# Patient Record
Sex: MALE | Employment: FULL TIME | URBAN - METROPOLITAN AREA
[De-identification: names, ages, dates, MRNs, and addresses within clinical notes are randomized per-mention and may not be internally consistent; named-entity substitution may affect disease eponyms.]

---

## 2022-11-09 ENCOUNTER — APPOINTMENT (OUTPATIENT)
Dept: GENERAL RADIOLOGY | Age: 42
DRG: 463 | End: 2022-11-09
Payer: COMMERCIAL

## 2022-11-09 ENCOUNTER — APPOINTMENT (OUTPATIENT)
Dept: CT IMAGING | Age: 42
DRG: 463 | End: 2022-11-09
Payer: COMMERCIAL

## 2022-11-09 ENCOUNTER — ANESTHESIA EVENT (OUTPATIENT)
Dept: OPERATING ROOM | Age: 42
DRG: 463 | End: 2022-11-09
Payer: COMMERCIAL

## 2022-11-09 ENCOUNTER — HOSPITAL ENCOUNTER (INPATIENT)
Age: 42
LOS: 17 days | Discharge: NURSING, REHAB OR LONG-TERM CARE | DRG: 463 | End: 2022-11-26
Attending: EMERGENCY MEDICINE | Admitting: SURGERY
Payer: COMMERCIAL

## 2022-11-09 ENCOUNTER — ANESTHESIA (OUTPATIENT)
Dept: OPERATING ROOM | Age: 42
DRG: 463 | End: 2022-11-09
Payer: COMMERCIAL

## 2022-11-09 DIAGNOSIS — S82.202C TYPE III OPEN FRACTURE OF LEFT TIBIA AND FIBULA, INITIAL ENCOUNTER: ICD-10-CM

## 2022-11-09 DIAGNOSIS — S82.402C TYPE III OPEN FRACTURE OF LEFT TIBIA AND FIBULA, INITIAL ENCOUNTER: ICD-10-CM

## 2022-11-09 DIAGNOSIS — S72.352A CLOSED DISPLACED COMMINUTED FRACTURE OF SHAFT OF LEFT FEMUR, INITIAL ENCOUNTER (HCC): ICD-10-CM
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
## 2022-11-09 PROBLEM — S82.142A TIBIAL PLATEAU FRACTURE, LEFT: Status: ACTIVE | Noted: 2022-11-09

## 2022-11-09 PROBLEM — S72.012A CLOSED SUBCAPITAL FRACTURE OF FEMUR, LEFT, INITIAL ENCOUNTER (HCC): Status: ACTIVE | Noted: 2022-11-09

## 2022-11-09 PROBLEM — S42.002A FRACTURE OF LEFT CLAVICLE: Status: ACTIVE | Noted: 2022-11-09

## 2022-11-09 PROBLEM — S82.402A FRACTURE OF LEFT FIBULA: Status: ACTIVE | Noted: 2022-11-09

## 2022-11-09 PROBLEM — S80.852A FOREIGN BODY IN LEFT LOWER EXTREMITY: Status: ACTIVE | Noted: 2022-11-09

## 2022-11-09 LAB
ABSOLUTE EOS #: 0 K/UL (ref 0–0.4)
ABSOLUTE IMMATURE GRANULOCYTE: 0 K/UL (ref 0–0.3)
ABSOLUTE LYMPH #: 2.34 K/UL (ref 1–4.8)
ABSOLUTE MONO #: 1.7 K/UL (ref 0.1–0.8)
ANION GAP SERPL CALCULATED.3IONS-SCNC: 12 MMOL/L (ref 9–17)
BASOPHILS # BLD: 0 % (ref 0–2)
BASOPHILS ABSOLUTE: 0 K/UL (ref 0–0.2)
BLOOD BANK SPECIMEN: ABNORMAL
BUN BLDV-MCNC: 19 MG/DL (ref 6–20)
CARBOXYHEMOGLOBIN: 3.8 % (ref 0–5)
CHLORIDE BLD-SCNC: 102 MMOL/L (ref 98–107)
CO2: 22 MMOL/L (ref 20–31)
CREAT SERPL-MCNC: 1.18 MG/DL (ref 0.7–1.2)
EOSINOPHILS RELATIVE PERCENT: 0 % (ref 1–4)
ETHANOL PERCENT: <0.01 %
ETHANOL: <10 MG/DL
FIO2: ABNORMAL
GFR SERPL CREATININE-BSD FRML MDRD: >60 ML/MIN/1.73M2
GLUCOSE BLD-MCNC: 155 MG/DL (ref 75–110)
GLUCOSE BLD-MCNC: 196 MG/DL (ref 70–99)
HCG QUALITATIVE: ABNORMAL
HCO3 VENOUS: 22.6 MMOL/L (ref 24–30)
HCT VFR BLD CALC: 40.8 % (ref 40.7–50.3)
HCT VFR BLD CALC: 44.4 % (ref 40.7–50.3)
HEMOGLOBIN: 13.9 G/DL (ref 13–17)
HEMOGLOBIN: 14.9 G/DL (ref 13–17)
IMMATURE GRANULOCYTES: 0 %
INR BLD: 1
LYMPHOCYTES # BLD: 11 % (ref 24–44)
MCH RBC QN AUTO: 31.2 PG (ref 25.2–33.5)
MCH RBC QN AUTO: 31.4 PG (ref 25.2–33.5)
MCHC RBC AUTO-ENTMCNC: 33.6 G/DL (ref 28.4–34.8)
MCHC RBC AUTO-ENTMCNC: 34.1 G/DL (ref 28.4–34.8)
MCV RBC AUTO: 91.5 FL (ref 82.6–102.9)
MCV RBC AUTO: 93.5 FL (ref 82.6–102.9)
MONOCYTES # BLD: 8 % (ref 1–7)
MORPHOLOGY: NORMAL
NEGATIVE BASE EXCESS, VEN: 4.8 MMOL/L (ref 0–2)
NRBC AUTOMATED: 0 PER 100 WBC
NRBC AUTOMATED: 0 PER 100 WBC
O2 SAT, VEN: 45.4 % (ref 60–85)
PARTIAL THROMBOPLASTIN TIME: 20.3 SEC (ref 20.5–30.5)
PATIENT TEMP: 37
PCO2, VEN: 52.6 MM HG (ref 39–55)
PDW BLD-RTO: 12.5 % (ref 11.8–14.4)
PDW BLD-RTO: 12.9 % (ref 11.8–14.4)
PH VENOUS: 7.26 (ref 7.32–7.42)
PLATELET # BLD: 187 K/UL (ref 138–453)
PLATELET # BLD: 267 K/UL (ref 138–453)
PMV BLD AUTO: 11.1 FL (ref 8.1–13.5)
PMV BLD AUTO: 11.2 FL (ref 8.1–13.5)
PO2, VEN: 27 MM HG (ref 30–50)
POTASSIUM SERPL-SCNC: 3.4 MMOL/L (ref 3.7–5.3)
PROTHROMBIN TIME: 10.7 SEC (ref 9.1–12.3)
RBC # BLD: 4.46 M/UL (ref 4.21–5.77)
RBC # BLD: 4.75 M/UL (ref 4.21–5.77)
SEG NEUTROPHILS: 81 % (ref 36–66)
SEGMENTED NEUTROPHILS ABSOLUTE COUNT: 17.26 K/UL (ref 1.8–7.7)
SODIUM BLD-SCNC: 136 MMOL/L (ref 135–144)
WBC # BLD: 21.3 K/UL (ref 3.5–11.3)
WBC # BLD: 30 K/UL (ref 3.5–11.3)

## 2022-11-09 PROCEDURE — 3700000001 HC ADD 15 MINUTES (ANESTHESIA): Performed by: STUDENT IN AN ORGANIZED HEALTH CARE EDUCATION/TRAINING PROGRAM

## 2022-11-09 PROCEDURE — 3600000004 HC SURGERY LEVEL 4 BASE: Performed by: STUDENT IN AN ORGANIZED HEALTH CARE EDUCATION/TRAINING PROGRAM

## 2022-11-09 PROCEDURE — 2580000003 HC RX 258: Performed by: NURSE PRACTITIONER

## 2022-11-09 PROCEDURE — 99285 EMERGENCY DEPT VISIT HI MDM: CPT

## 2022-11-09 PROCEDURE — 11012 DEB SKIN BONE AT FX SITE: CPT | Performed by: STUDENT IN AN ORGANIZED HEALTH CARE EDUCATION/TRAINING PROGRAM

## 2022-11-09 PROCEDURE — 73590 X-RAY EXAM OF LOWER LEG: CPT

## 2022-11-09 PROCEDURE — 82947 ASSAY GLUCOSE BLOOD QUANT: CPT

## 2022-11-09 PROCEDURE — 6360000002 HC RX W HCPCS: Performed by: NURSE PRACTITIONER

## 2022-11-09 PROCEDURE — 6360000002 HC RX W HCPCS: Performed by: ANESTHESIOLOGY

## 2022-11-09 PROCEDURE — 3209999900 FLUORO FOR SURGICAL PROCEDURES

## 2022-11-09 PROCEDURE — 99254 IP/OBS CNSLTJ NEW/EST MOD 60: CPT | Performed by: STUDENT IN AN ORGANIZED HEALTH CARE EDUCATION/TRAINING PROGRAM

## 2022-11-09 PROCEDURE — 36415 COLL VENOUS BLD VENIPUNCTURE: CPT

## 2022-11-09 PROCEDURE — 71260 CT THORAX DX C+: CPT

## 2022-11-09 PROCEDURE — 73552 X-RAY EXAM OF FEMUR 2/>: CPT

## 2022-11-09 PROCEDURE — 82805 BLOOD GASES W/O2 SATURATION: CPT

## 2022-11-09 PROCEDURE — 2500000003 HC RX 250 WO HCPCS

## 2022-11-09 PROCEDURE — 2580000003 HC RX 258: Performed by: NURSE ANESTHETIST, CERTIFIED REGISTERED

## 2022-11-09 PROCEDURE — 3209999900 CT LUMBAR SPINE TRAUMA RECONSTRUCTION

## 2022-11-09 PROCEDURE — 2500000003 HC RX 250 WO HCPCS: Performed by: NURSE ANESTHETIST, CERTIFIED REGISTERED

## 2022-11-09 PROCEDURE — 73706 CT ANGIO LWR EXTR W/O&W/DYE: CPT

## 2022-11-09 PROCEDURE — G0480 DRUG TEST DEF 1-7 CLASSES: HCPCS

## 2022-11-09 PROCEDURE — 82565 ASSAY OF CREATININE: CPT

## 2022-11-09 PROCEDURE — 2580000003 HC RX 258: Performed by: STUDENT IN AN ORGANIZED HEALTH CARE EDUCATION/TRAINING PROGRAM

## 2022-11-09 PROCEDURE — 86901 BLOOD TYPING SEROLOGIC RH(D): CPT

## 2022-11-09 PROCEDURE — 7100000001 HC PACU RECOVERY - ADDTL 15 MIN: Performed by: STUDENT IN AN ORGANIZED HEALTH CARE EDUCATION/TRAINING PROGRAM

## 2022-11-09 PROCEDURE — 0JCP0ZZ EXTIRPATION OF MATTER FROM LEFT LOWER LEG SUBCUTANEOUS TISSUE AND FASCIA, OPEN APPROACH: ICD-10-PCS | Performed by: STUDENT IN AN ORGANIZED HEALTH CARE EDUCATION/TRAINING PROGRAM

## 2022-11-09 PROCEDURE — 2060000000 HC ICU INTERMEDIATE R&B

## 2022-11-09 PROCEDURE — 73560 X-RAY EXAM OF KNEE 1 OR 2: CPT

## 2022-11-09 PROCEDURE — 2500000003 HC RX 250 WO HCPCS: Performed by: SURGERY

## 2022-11-09 PROCEDURE — 80051 ELECTROLYTE PANEL: CPT

## 2022-11-09 PROCEDURE — 85610 PROTHROMBIN TIME: CPT

## 2022-11-09 PROCEDURE — 73000 X-RAY EXAM OF COLLAR BONE: CPT

## 2022-11-09 PROCEDURE — 6360000004 HC RX CONTRAST MEDICATION

## 2022-11-09 PROCEDURE — 85025 COMPLETE CBC W/AUTO DIFF WBC: CPT

## 2022-11-09 PROCEDURE — 70450 CT HEAD/BRAIN W/O DYE: CPT

## 2022-11-09 PROCEDURE — 6810039000 HC L1 TRAUMA ALERT

## 2022-11-09 PROCEDURE — 72170 X-RAY EXAM OF PELVIS: CPT

## 2022-11-09 PROCEDURE — 27506 TREATMENT OF THIGH FRACTURE: CPT | Performed by: STUDENT IN AN ORGANIZED HEALTH CARE EDUCATION/TRAINING PROGRAM

## 2022-11-09 PROCEDURE — P9016 RBC LEUKOCYTES REDUCED: HCPCS

## 2022-11-09 PROCEDURE — 6360000002 HC RX W HCPCS: Performed by: NURSE ANESTHETIST, CERTIFIED REGISTERED

## 2022-11-09 PROCEDURE — 2720000010 HC SURG SUPPLY STERILE: Performed by: STUDENT IN AN ORGANIZED HEALTH CARE EDUCATION/TRAINING PROGRAM

## 2022-11-09 PROCEDURE — 90715 TDAP VACCINE 7 YRS/> IM: CPT

## 2022-11-09 PROCEDURE — 0QS906Z REPOSITION LEFT FEMORAL SHAFT WITH INTRAMEDULLARY INTERNAL FIXATION DEVICE, OPEN APPROACH: ICD-10-PCS | Performed by: STUDENT IN AN ORGANIZED HEALTH CARE EDUCATION/TRAINING PROGRAM

## 2022-11-09 PROCEDURE — 86900 BLOOD TYPING SEROLOGIC ABO: CPT

## 2022-11-09 PROCEDURE — 3209999900 CT THORACIC SPINE TRAUMA RECONSTRUCTION

## 2022-11-09 PROCEDURE — 72128 CT CHEST SPINE W/O DYE: CPT

## 2022-11-09 PROCEDURE — 86850 RBC ANTIBODY SCREEN: CPT

## 2022-11-09 PROCEDURE — 85027 COMPLETE CBC AUTOMATED: CPT

## 2022-11-09 PROCEDURE — 84520 ASSAY OF UREA NITROGEN: CPT

## 2022-11-09 PROCEDURE — 84703 CHORIONIC GONADOTROPIN ASSAY: CPT

## 2022-11-09 PROCEDURE — 72131 CT LUMBAR SPINE W/O DYE: CPT

## 2022-11-09 PROCEDURE — 73030 X-RAY EXAM OF SHOULDER: CPT

## 2022-11-09 PROCEDURE — 7100000000 HC PACU RECOVERY - FIRST 15 MIN: Performed by: STUDENT IN AN ORGANIZED HEALTH CARE EDUCATION/TRAINING PROGRAM

## 2022-11-09 PROCEDURE — 2709999900 HC NON-CHARGEABLE SUPPLY: Performed by: STUDENT IN AN ORGANIZED HEALTH CARE EDUCATION/TRAINING PROGRAM

## 2022-11-09 PROCEDURE — 73562 X-RAY EXAM OF KNEE 3: CPT

## 2022-11-09 PROCEDURE — 6360000002 HC RX W HCPCS: Performed by: STUDENT IN AN ORGANIZED HEALTH CARE EDUCATION/TRAINING PROGRAM

## 2022-11-09 PROCEDURE — 2500000003 HC RX 250 WO HCPCS: Performed by: NURSE PRACTITIONER

## 2022-11-09 PROCEDURE — 3600000014 HC SURGERY LEVEL 4 ADDTL 15MIN: Performed by: STUDENT IN AN ORGANIZED HEALTH CARE EDUCATION/TRAINING PROGRAM

## 2022-11-09 PROCEDURE — 11960 INSERT TISSUE EXPANDER(S): CPT | Performed by: STUDENT IN AN ORGANIZED HEALTH CARE EDUCATION/TRAINING PROGRAM

## 2022-11-09 PROCEDURE — 6360000002 HC RX W HCPCS: Performed by: SURGERY

## 2022-11-09 PROCEDURE — 71045 X-RAY EXAM CHEST 1 VIEW: CPT

## 2022-11-09 PROCEDURE — 3700000000 HC ANESTHESIA ATTENDED CARE: Performed by: STUDENT IN AN ORGANIZED HEALTH CARE EDUCATION/TRAINING PROGRAM

## 2022-11-09 PROCEDURE — 90471 IMMUNIZATION ADMIN: CPT

## 2022-11-09 PROCEDURE — 86920 COMPATIBILITY TEST SPIN: CPT

## 2022-11-09 PROCEDURE — C1769 GUIDE WIRE: HCPCS | Performed by: STUDENT IN AN ORGANIZED HEALTH CARE EDUCATION/TRAINING PROGRAM

## 2022-11-09 PROCEDURE — C1713 ANCHOR/SCREW BN/BN,TIS/BN: HCPCS | Performed by: STUDENT IN AN ORGANIZED HEALTH CARE EDUCATION/TRAINING PROGRAM

## 2022-11-09 PROCEDURE — 6360000002 HC RX W HCPCS

## 2022-11-09 PROCEDURE — 85730 THROMBOPLASTIN TIME PARTIAL: CPT

## 2022-11-09 PROCEDURE — 72125 CT NECK SPINE W/O DYE: CPT

## 2022-11-09 PROCEDURE — 0JHP0NZ INSERTION OF TISSUE EXPANDER INTO LEFT LOWER LEG SUBCUTANEOUS TISSUE AND FASCIA, OPEN APPROACH: ICD-10-PCS | Performed by: STUDENT IN AN ORGANIZED HEALTH CARE EDUCATION/TRAINING PROGRAM

## 2022-11-09 DEVICE — SCREW BNE LCK 5X64 MM XL25 RETROGRADE TI STRL RFNADVANCED: Type: IMPLANTABLE DEVICE | Site: FEMUR | Status: FUNCTIONAL

## 2022-11-09 DEVICE — SCREW LK F/IM NAIL 3X36MM XL25 ST: Type: IMPLANTABLE DEVICE | Site: FEMUR | Status: FUNCTIONAL

## 2022-11-09 DEVICE — SCREW LK F/IM NAIL 5X42MM XL25 STERILE: Type: IMPLANTABLE DEVICE | Site: FEMUR | Status: FUNCTIONAL

## 2022-11-09 DEVICE — SCREW LCK F/IM NAIL 5X44MM XL25 STR: Type: IMPLANTABLE DEVICE | Site: FEMUR | Status: FUNCTIONAL

## 2022-11-09 DEVICE — IMPLANTABLE DEVICE: Type: IMPLANTABLE DEVICE | Site: FEMUR | Status: FUNCTIONAL

## 2022-11-09 RX ORDER — SODIUM CHLORIDE 0.9 % (FLUSH) 0.9 %
5-40 SYRINGE (ML) INJECTION PRN
Status: DISCONTINUED | OUTPATIENT
Start: 2022-11-09 | End: 2022-11-11

## 2022-11-09 RX ORDER — ONDANSETRON 2 MG/ML
4 INJECTION INTRAMUSCULAR; INTRAVENOUS
Status: ACTIVE | OUTPATIENT
Start: 2022-11-09 | End: 2022-11-10

## 2022-11-09 RX ORDER — GINSENG 100 MG
CAPSULE ORAL 3 TIMES DAILY
Status: DISCONTINUED | OUTPATIENT
Start: 2022-11-09 | End: 2022-11-26 | Stop reason: HOSPADM

## 2022-11-09 RX ORDER — ONDANSETRON 2 MG/ML
INJECTION INTRAMUSCULAR; INTRAVENOUS PRN
Status: DISCONTINUED | OUTPATIENT
Start: 2022-11-09 | End: 2022-11-09 | Stop reason: SDUPTHER

## 2022-11-09 RX ORDER — IBUPROFEN 400 MG/1
400 TABLET ORAL EVERY 4 HOURS
Status: DISPENSED | OUTPATIENT
Start: 2022-11-09 | End: 2022-11-16

## 2022-11-09 RX ORDER — POLYETHYLENE GLYCOL 3350 17 G/17G
17 POWDER, FOR SOLUTION ORAL DAILY
Status: DISCONTINUED | OUTPATIENT
Start: 2022-11-09 | End: 2022-11-26 | Stop reason: HOSPADM

## 2022-11-09 RX ORDER — FENTANYL CITRATE 50 UG/ML
INJECTION, SOLUTION INTRAMUSCULAR; INTRAVENOUS
Status: DISPENSED
Start: 2022-11-09 | End: 2022-11-09

## 2022-11-09 RX ORDER — CLINDAMYCIN PHOSPHATE 600 MG/50ML
600 INJECTION INTRAVENOUS EVERY 8 HOURS
Status: DISCONTINUED | OUTPATIENT
Start: 2022-11-09 | End: 2022-11-16

## 2022-11-09 RX ORDER — FENTANYL CITRATE 50 UG/ML
INJECTION, SOLUTION INTRAMUSCULAR; INTRAVENOUS DAILY PRN
Status: COMPLETED | OUTPATIENT
Start: 2022-11-09 | End: 2022-11-09

## 2022-11-09 RX ORDER — FENTANYL CITRATE 50 UG/ML
50 INJECTION, SOLUTION INTRAMUSCULAR; INTRAVENOUS EVERY 5 MIN PRN
Status: COMPLETED | OUTPATIENT
Start: 2022-11-09 | End: 2022-11-09

## 2022-11-09 RX ORDER — CLINDAMYCIN PHOSPHATE 600 MG/50ML
600 INJECTION INTRAVENOUS ONCE
Status: COMPLETED | OUTPATIENT
Start: 2022-11-09 | End: 2022-11-09

## 2022-11-09 RX ORDER — ACETAMINOPHEN 500 MG
1000 TABLET ORAL EVERY 8 HOURS SCHEDULED
Status: DISCONTINUED | OUTPATIENT
Start: 2022-11-09 | End: 2022-11-26 | Stop reason: HOSPADM

## 2022-11-09 RX ORDER — SODIUM CHLORIDE, SODIUM LACTATE, POTASSIUM CHLORIDE, CALCIUM CHLORIDE 600; 310; 30; 20 MG/100ML; MG/100ML; MG/100ML; MG/100ML
INJECTION, SOLUTION INTRAVENOUS CONTINUOUS
Status: DISCONTINUED | OUTPATIENT
Start: 2022-11-09 | End: 2022-11-11

## 2022-11-09 RX ORDER — ONDANSETRON 2 MG/ML
4 INJECTION INTRAMUSCULAR; INTRAVENOUS EVERY 6 HOURS PRN
Status: DISCONTINUED | OUTPATIENT
Start: 2022-11-09 | End: 2022-11-26 | Stop reason: HOSPADM

## 2022-11-09 RX ORDER — LIDOCAINE HYDROCHLORIDE 10 MG/ML
INJECTION, SOLUTION INFILTRATION; PERINEURAL
Status: COMPLETED
Start: 2022-11-09 | End: 2022-11-09

## 2022-11-09 RX ORDER — GLYCOPYRROLATE 0.2 MG/ML
INJECTION INTRAMUSCULAR; INTRAVENOUS PRN
Status: DISCONTINUED | OUTPATIENT
Start: 2022-11-09 | End: 2022-11-09 | Stop reason: SDUPTHER

## 2022-11-09 RX ORDER — DEXAMETHASONE SODIUM PHOSPHATE 10 MG/ML
INJECTION INTRAMUSCULAR; INTRAVENOUS PRN
Status: DISCONTINUED | OUTPATIENT
Start: 2022-11-09 | End: 2022-11-09 | Stop reason: SDUPTHER

## 2022-11-09 RX ORDER — CLINDAMYCIN PHOSPHATE 600 MG/50ML
INJECTION INTRAVENOUS
Status: COMPLETED
Start: 2022-11-09 | End: 2022-11-09

## 2022-11-09 RX ORDER — TOBRAMYCIN 1.2 G/30ML
INJECTION, POWDER, LYOPHILIZED, FOR SOLUTION INTRAVENOUS PRN
Status: DISCONTINUED | OUTPATIENT
Start: 2022-11-09 | End: 2022-11-09 | Stop reason: ALTCHOICE

## 2022-11-09 RX ORDER — PROPOFOL 10 MG/ML
INJECTION, EMULSION INTRAVENOUS PRN
Status: DISCONTINUED | OUTPATIENT
Start: 2022-11-09 | End: 2022-11-09 | Stop reason: SDUPTHER

## 2022-11-09 RX ORDER — LIDOCAINE HYDROCHLORIDE 10 MG/ML
5 INJECTION, SOLUTION INFILTRATION; PERINEURAL ONCE
Status: COMPLETED | OUTPATIENT
Start: 2022-11-09 | End: 2022-11-09

## 2022-11-09 RX ORDER — SODIUM CHLORIDE 9 MG/ML
INJECTION, SOLUTION INTRAVENOUS PRN
Status: DISCONTINUED | OUTPATIENT
Start: 2022-11-09 | End: 2022-11-11

## 2022-11-09 RX ORDER — ROCURONIUM BROMIDE 10 MG/ML
INJECTION, SOLUTION INTRAVENOUS PRN
Status: DISCONTINUED | OUTPATIENT
Start: 2022-11-09 | End: 2022-11-09 | Stop reason: SDUPTHER

## 2022-11-09 RX ORDER — MAGNESIUM HYDROXIDE 1200 MG/15ML
LIQUID ORAL CONTINUOUS PRN
Status: COMPLETED | OUTPATIENT
Start: 2022-11-09 | End: 2022-11-09

## 2022-11-09 RX ORDER — FENTANYL CITRATE 50 UG/ML
25 INJECTION, SOLUTION INTRAMUSCULAR; INTRAVENOUS EVERY 5 MIN PRN
Status: DISCONTINUED | OUTPATIENT
Start: 2022-11-09 | End: 2022-11-10

## 2022-11-09 RX ORDER — TRANEXAMIC ACID 10 MG/ML
1000 INJECTION, SOLUTION INTRAVENOUS ONCE
Status: COMPLETED | OUTPATIENT
Start: 2022-11-09 | End: 2022-11-09

## 2022-11-09 RX ORDER — FENTANYL CITRATE 50 UG/ML
INJECTION, SOLUTION INTRAMUSCULAR; INTRAVENOUS PRN
Status: DISCONTINUED | OUTPATIENT
Start: 2022-11-09 | End: 2022-11-09 | Stop reason: SDUPTHER

## 2022-11-09 RX ORDER — ONDANSETRON 4 MG/1
4 TABLET, ORALLY DISINTEGRATING ORAL EVERY 8 HOURS PRN
Status: DISCONTINUED | OUTPATIENT
Start: 2022-11-09 | End: 2022-11-26 | Stop reason: HOSPADM

## 2022-11-09 RX ORDER — SODIUM CHLORIDE 0.9 % (FLUSH) 0.9 %
10 SYRINGE (ML) INJECTION PRN
Status: DISCONTINUED | OUTPATIENT
Start: 2022-11-09 | End: 2022-11-26 | Stop reason: HOSPADM

## 2022-11-09 RX ORDER — PHENYLEPHRINE HYDROCHLORIDE 10 MG/ML
INJECTION INTRAVENOUS PRN
Status: DISCONTINUED | OUTPATIENT
Start: 2022-11-09 | End: 2022-11-09 | Stop reason: SDUPTHER

## 2022-11-09 RX ORDER — SODIUM CHLORIDE 0.9 % (FLUSH) 0.9 %
5-40 SYRINGE (ML) INJECTION EVERY 12 HOURS SCHEDULED
Status: DISCONTINUED | OUTPATIENT
Start: 2022-11-09 | End: 2022-11-11

## 2022-11-09 RX ORDER — METHOCARBAMOL 750 MG/1
750 TABLET, FILM COATED ORAL EVERY 8 HOURS
Status: DISCONTINUED | OUTPATIENT
Start: 2022-11-09 | End: 2022-11-26 | Stop reason: HOSPADM

## 2022-11-09 RX ORDER — OXYCODONE HYDROCHLORIDE 5 MG/1
10 TABLET ORAL EVERY 4 HOURS PRN
Status: DISCONTINUED | OUTPATIENT
Start: 2022-11-09 | End: 2022-11-10

## 2022-11-09 RX ORDER — VANCOMYCIN HYDROCHLORIDE 1 G/20ML
INJECTION, POWDER, LYOPHILIZED, FOR SOLUTION INTRAVENOUS PRN
Status: DISCONTINUED | OUTPATIENT
Start: 2022-11-09 | End: 2022-11-09 | Stop reason: ALTCHOICE

## 2022-11-09 RX ORDER — NEOSTIGMINE METHYLSULFATE 5 MG/5 ML
SYRINGE (ML) INTRAVENOUS PRN
Status: DISCONTINUED | OUTPATIENT
Start: 2022-11-09 | End: 2022-11-09 | Stop reason: SDUPTHER

## 2022-11-09 RX ORDER — MIDAZOLAM HYDROCHLORIDE 1 MG/ML
INJECTION INTRAMUSCULAR; INTRAVENOUS PRN
Status: DISCONTINUED | OUTPATIENT
Start: 2022-11-09 | End: 2022-11-09 | Stop reason: SDUPTHER

## 2022-11-09 RX ORDER — LIDOCAINE HYDROCHLORIDE 10 MG/ML
INJECTION, SOLUTION EPIDURAL; INFILTRATION; INTRACAUDAL; PERINEURAL PRN
Status: DISCONTINUED | OUTPATIENT
Start: 2022-11-09 | End: 2022-11-09 | Stop reason: SDUPTHER

## 2022-11-09 RX ORDER — CEFAZOLIN SODIUM 1 G/3ML
INJECTION, POWDER, FOR SOLUTION INTRAMUSCULAR; INTRAVENOUS PRN
Status: DISCONTINUED | OUTPATIENT
Start: 2022-11-09 | End: 2022-11-09 | Stop reason: SDUPTHER

## 2022-11-09 RX ORDER — SODIUM CHLORIDE 9 MG/ML
INJECTION, SOLUTION INTRAVENOUS PRN
Status: DISCONTINUED | OUTPATIENT
Start: 2022-11-09 | End: 2022-11-26 | Stop reason: HOSPADM

## 2022-11-09 RX ORDER — SENNA AND DOCUSATE SODIUM 50; 8.6 MG/1; MG/1
1 TABLET, FILM COATED ORAL 2 TIMES DAILY
Status: DISCONTINUED | OUTPATIENT
Start: 2022-11-09 | End: 2022-11-26 | Stop reason: HOSPADM

## 2022-11-09 RX ADMIN — LIDOCAINE HYDROCHLORIDE: 10 INJECTION, SOLUTION INFILTRATION; PERINEURAL at 09:33

## 2022-11-09 RX ADMIN — TRANEXAMIC ACID 1000 MG: 10 INJECTION, SOLUTION INTRAVENOUS at 12:22

## 2022-11-09 RX ADMIN — PHENYLEPHRINE HYDROCHLORIDE 100 MCG: 10 INJECTION INTRAVENOUS at 19:48

## 2022-11-09 RX ADMIN — IOPAMIDOL 200 ML: 755 INJECTION, SOLUTION INTRAVENOUS at 09:01

## 2022-11-09 RX ADMIN — FENTANYL CITRATE 50 MCG: 0.05 INJECTION, SOLUTION INTRAMUSCULAR; INTRAVENOUS at 22:07

## 2022-11-09 RX ADMIN — ROCURONIUM BROMIDE 50 MG: 10 INJECTION, SOLUTION INTRAVENOUS at 17:51

## 2022-11-09 RX ADMIN — ROCURONIUM BROMIDE 10 MG: 10 INJECTION, SOLUTION INTRAVENOUS at 20:37

## 2022-11-09 RX ADMIN — CEFAZOLIN 2 G: 1 INJECTION, POWDER, FOR SOLUTION INTRAMUSCULAR; INTRAVENOUS at 18:13

## 2022-11-09 RX ADMIN — FENTANYL CITRATE 50 MCG: 0.05 INJECTION, SOLUTION INTRAMUSCULAR; INTRAVENOUS at 20:16

## 2022-11-09 RX ADMIN — CLINDAMYCIN PHOSPHATE 600 MG: 600 INJECTION INTRAVENOUS at 09:49

## 2022-11-09 RX ADMIN — ROCURONIUM BROMIDE 10 MG: 10 INJECTION, SOLUTION INTRAVENOUS at 19:43

## 2022-11-09 RX ADMIN — PHENYLEPHRINE HYDROCHLORIDE 100 MCG: 10 INJECTION INTRAVENOUS at 21:37

## 2022-11-09 RX ADMIN — LIDOCAINE HYDROCHLORIDE 50 MG: 10 INJECTION, SOLUTION EPIDURAL; INFILTRATION; INTRACAUDAL; PERINEURAL at 17:49

## 2022-11-09 RX ADMIN — PHENYLEPHRINE HYDROCHLORIDE 100 MCG: 10 INJECTION INTRAVENOUS at 18:45

## 2022-11-09 RX ADMIN — GENTAMICIN SULFATE 650 MG: 40 INJECTION, SOLUTION INTRAMUSCULAR; INTRAVENOUS at 10:55

## 2022-11-09 RX ADMIN — PHENYLEPHRINE HYDROCHLORIDE 100 MCG: 10 INJECTION INTRAVENOUS at 19:51

## 2022-11-09 RX ADMIN — ONDANSETRON 4 MG: 2 INJECTION INTRAMUSCULAR; INTRAVENOUS at 21:17

## 2022-11-09 RX ADMIN — FENTANYL CITRATE 100 MCG: 0.05 INJECTION, SOLUTION INTRAMUSCULAR; INTRAVENOUS at 18:26

## 2022-11-09 RX ADMIN — Medication 0.2 MG/KG/HR: at 09:49

## 2022-11-09 RX ADMIN — FENTANYL CITRATE 100 MCG: 50 INJECTION, SOLUTION INTRAMUSCULAR; INTRAVENOUS at 08:18

## 2022-11-09 RX ADMIN — PHENYLEPHRINE HYDROCHLORIDE 100 MCG/MIN: 10 INJECTION INTRAVENOUS at 18:57

## 2022-11-09 RX ADMIN — CLINDAMYCIN IN 5 PERCENT DEXTROSE 600 MG: 12 INJECTION, SOLUTION INTRAVENOUS at 09:49

## 2022-11-09 RX ADMIN — SODIUM CHLORIDE, POTASSIUM CHLORIDE, SODIUM LACTATE AND CALCIUM CHLORIDE: 600; 310; 30; 20 INJECTION, SOLUTION INTRAVENOUS at 10:56

## 2022-11-09 RX ADMIN — FENTANYL CITRATE 50 MCG: 50 INJECTION INTRAMUSCULAR; INTRAVENOUS at 22:57

## 2022-11-09 RX ADMIN — PROPOFOL 200 MG: 10 INJECTION, EMULSION INTRAVENOUS at 17:50

## 2022-11-09 RX ADMIN — DEXAMETHASONE SODIUM PHOSPHATE 10 MG: 10 INJECTION INTRAMUSCULAR; INTRAVENOUS at 18:19

## 2022-11-09 RX ADMIN — FENTANYL CITRATE 50 MCG: 0.05 INJECTION, SOLUTION INTRAMUSCULAR; INTRAVENOUS at 21:25

## 2022-11-09 RX ADMIN — SODIUM CHLORIDE, POTASSIUM CHLORIDE, SODIUM LACTATE AND CALCIUM CHLORIDE: 600; 310; 30; 20 INJECTION, SOLUTION INTRAVENOUS at 21:16

## 2022-11-09 RX ADMIN — FENTANYL CITRATE 50 MCG: 50 INJECTION, SOLUTION INTRAMUSCULAR; INTRAVENOUS at 08:54

## 2022-11-09 RX ADMIN — FENTANYL CITRATE 50 MCG: 50 INJECTION INTRAMUSCULAR; INTRAVENOUS at 23:03

## 2022-11-09 RX ADMIN — FENTANYL CITRATE 50 MCG: 50 INJECTION, SOLUTION INTRAMUSCULAR; INTRAVENOUS at 08:28

## 2022-11-09 RX ADMIN — MIDAZOLAM 2 MG: 1 INJECTION INTRAMUSCULAR; INTRAVENOUS at 17:47

## 2022-11-09 RX ADMIN — PHENYLEPHRINE HYDROCHLORIDE 100 MCG: 10 INJECTION INTRAVENOUS at 18:49

## 2022-11-09 RX ADMIN — Medication 4 MG: at 21:23

## 2022-11-09 RX ADMIN — PHENYLEPHRINE HYDROCHLORIDE 200 MCG: 10 INJECTION INTRAVENOUS at 21:45

## 2022-11-09 RX ADMIN — ROCURONIUM BROMIDE 20 MG: 10 INJECTION, SOLUTION INTRAVENOUS at 18:27

## 2022-11-09 RX ADMIN — TRANEXAMIC ACID 1000 MG: 10 INJECTION, SOLUTION INTRAVENOUS at 17:59

## 2022-11-09 RX ADMIN — PHENYLEPHRINE HYDROCHLORIDE 100 MCG: 10 INJECTION INTRAVENOUS at 18:52

## 2022-11-09 RX ADMIN — FENTANYL CITRATE 50 MCG: 0.05 INJECTION, SOLUTION INTRAMUSCULAR; INTRAVENOUS at 22:11

## 2022-11-09 RX ADMIN — ROCURONIUM BROMIDE 10 MG: 10 INJECTION, SOLUTION INTRAVENOUS at 18:53

## 2022-11-09 RX ADMIN — FENTANYL CITRATE 50 MCG: 0.05 INJECTION, SOLUTION INTRAMUSCULAR; INTRAVENOUS at 21:24

## 2022-11-09 RX ADMIN — GLYCOPYRROLATE 0.6 MG: 0.2 INJECTION INTRAMUSCULAR; INTRAVENOUS at 21:23

## 2022-11-09 RX ADMIN — TETANUS TOXOID, REDUCED DIPHTHERIA TOXOID AND ACELLULAR PERTUSSIS VACCINE, ADSORBED 0.5 ML: 5; 2.5; 8; 8; 2.5 SUSPENSION INTRAMUSCULAR at 09:16

## 2022-11-09 ASSESSMENT — ENCOUNTER SYMPTOMS
TACHYPNEA: 1
SORE THROAT: 0
ABDOMINAL PAIN: 0
SHORTNESS OF BREATH: 0

## 2022-11-09 ASSESSMENT — PAIN SCALES - GENERAL
PAINLEVEL_OUTOF10: 5
PAINLEVEL_OUTOF10: 4
PAINLEVEL_OUTOF10: 4
PAINLEVEL_OUTOF10: 5
PAINLEVEL_OUTOF10: 5
PAINLEVEL_OUTOF10: 7
PAINLEVEL_OUTOF10: 6
PAINLEVEL_OUTOF10: 5

## 2022-11-09 ASSESSMENT — PAIN DESCRIPTION - LOCATION
LOCATION: LEG

## 2022-11-09 ASSESSMENT — PAIN DESCRIPTION - ORIENTATION
ORIENTATION: LEFT
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
## 2022-11-09 NOTE — ED NOTES
Writer witnesses surgical consent with ortho  Writer witnesses and co-sgns pt. DPOA paperwork with Lidia Davis, copy in pt.   Chart      Tariq Morales RN  11/09/22 [id number]
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
## 2022-11-09 NOTE — ED NOTES
Thao Hernandez at bedside completing [facility] paperwork   Pt.  States he does not want his wife his POA, would like to designate another person      Wiley Flores, [facility]  11/09/22 [id number]

## 2022-11-09 NOTE — ED NOTES
Pt. Medicated per orders see MAR  Pt. Resting on stretcher, eyes open, RR even and non-labored  Pt. Updated on POC  Will continue to monitor   Family at bedside  Pt. Continues to ask for collar to be removed, pt.  Reeducated that trauma team needs to clear him      Liang Gilbert RN  11/09/22 [id number]
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
## 2022-11-09 NOTE — PROGRESS NOTES
Triage called to notify  that sister was requesting an update.  visited with sister in [facility]. Then walked her to waiting area inside ED and spoke with both her and patient briefly once in room. Patient appeared to be coping. He said the pain \"hurts like hell\" and said he'd like to sit up. Nurse declined request to sit up. Sister appeared to be a good support to patient. She requested that  return later to provide support to patient's mom once she arrives. Chaplains will remain available to offer spiritual and emotional support as needed. Electronically signed by Scout Vazquez on 11/9/2022 at 11:04 AM.  [facility]  [phone]        11/09/22 1059   Encounter Summary   Service Provided For: Family; Patient and family together   Referral/Consult From: Family   Support System Parent;Significant other;Family members   Last Encounter  11/09/22   Complexity of Encounter Low   Begin Time 1040   End Time  1105   Total Time Calculated 25 min   Encounter    Type Follow up   Assessment/Intervention/Outcome   Assessment Calm;Coping   Intervention Active listening;Explored/Affirmed feelings, thoughts, concerns   Outcome Engaged in conversation;Expressed feelings, needs, and concerns;Expressed Gratitude;Receptive

## 2022-11-09 NOTE — ED NOTES
Pt. Resting on stretcher, eyes open, RR even and non-labored  Pt.  Updated on POC  Will continue to monitor   Family at bedside      Richie Posey RN  11/09/22 2117
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
## 2022-11-09 NOTE — ED NOTES
Trauma rounding at bedside   Start txa drip  Call if pt becomes tachycadic sustained @ 124 or greater or if pt.  BP continues to be unstable        Rayo Chambres RN  11/09/22 0710
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
## 2022-11-09 NOTE — PROGRESS NOTES
C-TLSspine is considered cleared w/out need for further imaging, evaluation, or continuation of c-collar.

## 2022-11-09 NOTE — ED NOTES
Biphasic left PT pulse via doppler Dr. Carloz Robledo  Biphasic left DP pulse via doppler Dr. Bryson Leonard, RN  11/09/22 6329

## 2022-11-10 ENCOUNTER — APPOINTMENT (OUTPATIENT)
Dept: GENERAL RADIOLOGY | Age: 42
DRG: 463 | End: 2022-11-10
Payer: COMMERCIAL

## 2022-11-10 ENCOUNTER — APPOINTMENT (OUTPATIENT)
Dept: CT IMAGING | Age: 42
DRG: 463 | End: 2022-11-10
Payer: COMMERCIAL

## 2022-11-10 ENCOUNTER — APPOINTMENT (OUTPATIENT)
Dept: MRI IMAGING | Age: 42
DRG: 463 | End: 2022-11-10
Payer: COMMERCIAL

## 2022-11-10 PROBLEM — G99.2 STENOSIS OF CERVICAL SPINE WITH MYELOPATHY (HCC): Status: ACTIVE | Noted: 2022-11-10

## 2022-11-10 PROBLEM — M48.02 STENOSIS OF CERVICAL SPINE WITH MYELOPATHY (HCC): Status: ACTIVE | Noted: 2022-11-10

## 2022-11-10 LAB
ABSOLUTE EOS #: 0 K/UL (ref 0–0.4)
ABSOLUTE IMMATURE GRANULOCYTE: 0 K/UL (ref 0–0.3)
ABSOLUTE LYMPH #: 2.04 K/UL (ref 1–4.8)
ABSOLUTE MONO #: 1.41 K/UL (ref 0.1–0.8)
ANION GAP SERPL CALCULATED.3IONS-SCNC: 12 MMOL/L (ref 9–17)
ANION GAP SERPL CALCULATED.3IONS-SCNC: 8 MMOL/L (ref 9–17)
BASOPHILS # BLD: 0 % (ref 0–2)
BASOPHILS ABSOLUTE: 0 K/UL (ref 0–0.2)
BUN BLDV-MCNC: 17 MG/DL (ref 6–20)
BUN BLDV-MCNC: 26 MG/DL (ref 6–20)
CALCIUM SERPL-MCNC: 7 MG/DL (ref 8.6–10.4)
CALCIUM SERPL-MCNC: 7 MG/DL (ref 8.6–10.4)
CHLORIDE BLD-SCNC: 101 MMOL/L (ref 98–107)
CHLORIDE BLD-SCNC: 104 MMOL/L (ref 98–107)
CO2: 19 MMOL/L (ref 20–31)
CO2: 21 MMOL/L (ref 20–31)
CREAT SERPL-MCNC: 0.84 MG/DL (ref 0.7–1.2)
CREAT SERPL-MCNC: 1.47 MG/DL (ref 0.7–1.2)
EOSINOPHILS RELATIVE PERCENT: 0 % (ref 1–4)
GFR SERPL CREATININE-BSD FRML MDRD: 60 ML/MIN/1.73M2
GFR SERPL CREATININE-BSD FRML MDRD: >60 ML/MIN/1.73M2
GLUCOSE BLD-MCNC: 112 MG/DL (ref 70–99)
GLUCOSE BLD-MCNC: 130 MG/DL (ref 70–99)
HCT VFR BLD CALC: 29 % (ref 40.7–50.3)
HEMOGLOBIN: 9.9 G/DL (ref 13–17)
IMMATURE GRANULOCYTES: 0 %
LYMPHOCYTES # BLD: 13 % (ref 24–44)
MAGNESIUM: 1.7 MG/DL (ref 1.6–2.6)
MCH RBC QN AUTO: 31 PG (ref 25.2–33.5)
MCHC RBC AUTO-ENTMCNC: 34.1 G/DL (ref 28.4–34.8)
MCV RBC AUTO: 90.9 FL (ref 82.6–102.9)
MONOCYTES # BLD: 9 % (ref 1–7)
MORPHOLOGY: NORMAL
MYOGLOBIN: 8690 NG/ML (ref 28–72)
MYOGLOBIN: ABNORMAL NG/ML (ref 28–72)
NRBC AUTOMATED: 0 PER 100 WBC
PDW BLD-RTO: 13.1 % (ref 11.8–14.4)
PHOSPHORUS: 3.4 MG/DL (ref 2.5–4.5)
PLATELET # BLD: ABNORMAL K/UL (ref 138–453)
PLATELET, FLUORESCENCE: 123 K/UL (ref 138–453)
PLATELET, IMMATURE FRACTION: 7 % (ref 1.1–10.3)
POTASSIUM SERPL-SCNC: 4.7 MMOL/L (ref 3.7–5.3)
POTASSIUM SERPL-SCNC: 5.2 MMOL/L (ref 3.7–5.3)
RBC # BLD: 3.19 M/UL (ref 4.21–5.77)
SEG NEUTROPHILS: 78 % (ref 36–66)
SEGMENTED NEUTROPHILS ABSOLUTE COUNT: 12.25 K/UL (ref 1.8–7.7)
SODIUM BLD-SCNC: 132 MMOL/L (ref 135–144)
SODIUM BLD-SCNC: 133 MMOL/L (ref 135–144)
TOTAL CK: ABNORMAL U/L (ref 39–308)
TOTAL CK: ABNORMAL U/L (ref 39–308)
VITAMIN D 25-HYDROXY: 15 NG/ML
WBC # BLD: 15.7 K/UL (ref 3.5–11.3)

## 2022-11-10 PROCEDURE — 73100 X-RAY EXAM OF WRIST: CPT

## 2022-11-10 PROCEDURE — 73700 CT LOWER EXTREMITY W/O DYE: CPT

## 2022-11-10 PROCEDURE — 83874 ASSAY OF MYOGLOBIN: CPT

## 2022-11-10 PROCEDURE — 84100 ASSAY OF PHOSPHORUS: CPT

## 2022-11-10 PROCEDURE — 6360000002 HC RX W HCPCS: Performed by: STUDENT IN AN ORGANIZED HEALTH CARE EDUCATION/TRAINING PROGRAM

## 2022-11-10 PROCEDURE — 51702 INSERT TEMP BLADDER CATH: CPT

## 2022-11-10 PROCEDURE — 2580000003 HC RX 258: Performed by: STUDENT IN AN ORGANIZED HEALTH CARE EDUCATION/TRAINING PROGRAM

## 2022-11-10 PROCEDURE — 73060 X-RAY EXAM OF HUMERUS: CPT

## 2022-11-10 PROCEDURE — 80048 BASIC METABOLIC PNL TOTAL CA: CPT

## 2022-11-10 PROCEDURE — 36415 COLL VENOUS BLD VENIPUNCTURE: CPT

## 2022-11-10 PROCEDURE — 73090 X-RAY EXAM OF FOREARM: CPT

## 2022-11-10 PROCEDURE — 2060000000 HC ICU INTERMEDIATE R&B

## 2022-11-10 PROCEDURE — 73070 X-RAY EXAM OF ELBOW: CPT

## 2022-11-10 PROCEDURE — 73030 X-RAY EXAM OF SHOULDER: CPT

## 2022-11-10 PROCEDURE — 6370000000 HC RX 637 (ALT 250 FOR IP): Performed by: STUDENT IN AN ORGANIZED HEALTH CARE EDUCATION/TRAINING PROGRAM

## 2022-11-10 PROCEDURE — 94150 VITAL CAPACITY TEST: CPT

## 2022-11-10 PROCEDURE — 94761 N-INVAS EAR/PLS OXIMETRY MLT: CPT

## 2022-11-10 PROCEDURE — 85055 RETICULATED PLATELET ASSAY: CPT

## 2022-11-10 PROCEDURE — 93005 ELECTROCARDIOGRAM TRACING: CPT | Performed by: SURGERY

## 2022-11-10 PROCEDURE — 82306 VITAMIN D 25 HYDROXY: CPT

## 2022-11-10 PROCEDURE — 82550 ASSAY OF CK (CPK): CPT

## 2022-11-10 PROCEDURE — 83735 ASSAY OF MAGNESIUM: CPT

## 2022-11-10 PROCEDURE — 2580000003 HC RX 258: Performed by: ANESTHESIOLOGY

## 2022-11-10 PROCEDURE — 2500000003 HC RX 250 WO HCPCS: Performed by: STUDENT IN AN ORGANIZED HEALTH CARE EDUCATION/TRAINING PROGRAM

## 2022-11-10 PROCEDURE — 72141 MRI NECK SPINE W/O DYE: CPT

## 2022-11-10 PROCEDURE — 2700000000 HC OXYGEN THERAPY PER DAY

## 2022-11-10 PROCEDURE — 02HV33Z INSERTION OF INFUSION DEVICE INTO SUPERIOR VENA CAVA, PERCUTANEOUS APPROACH: ICD-10-PCS | Performed by: SURGERY

## 2022-11-10 PROCEDURE — 85025 COMPLETE CBC W/AUTO DIFF WBC: CPT

## 2022-11-10 RX ORDER — ERGOCALCIFEROL 1.25 MG/1
50000 CAPSULE ORAL WEEKLY
Qty: 8 CAPSULE | Refills: 0 | Status: SHIPPED | OUTPATIENT
Start: 2022-11-10 | End: 2022-12-30

## 2022-11-10 RX ORDER — GABAPENTIN 300 MG/1
300 CAPSULE ORAL 3 TIMES DAILY
Status: DISCONTINUED | OUTPATIENT
Start: 2022-11-10 | End: 2022-11-26 | Stop reason: HOSPADM

## 2022-11-10 RX ORDER — MAGNESIUM SULFATE 1 G/100ML
1000 INJECTION INTRAVENOUS
Status: COMPLETED | OUTPATIENT
Start: 2022-11-10 | End: 2022-11-10

## 2022-11-10 RX ORDER — HEPARIN SODIUM 5000 [USP'U]/ML
5000 INJECTION, SOLUTION INTRAVENOUS; SUBCUTANEOUS EVERY 8 HOURS SCHEDULED
Status: DISCONTINUED | OUTPATIENT
Start: 2022-11-10 | End: 2022-11-14

## 2022-11-10 RX ORDER — OXYCODONE HYDROCHLORIDE 5 MG/1
5 TABLET ORAL EVERY 4 HOURS PRN
Status: DISCONTINUED | OUTPATIENT
Start: 2022-11-10 | End: 2022-11-12

## 2022-11-10 RX ORDER — 0.9 % SODIUM CHLORIDE 0.9 %
1000 INTRAVENOUS SOLUTION INTRAVENOUS ONCE
Status: COMPLETED | OUTPATIENT
Start: 2022-11-10 | End: 2022-11-10

## 2022-11-10 RX ORDER — CLINDAMYCIN PHOSPHATE 900 MG/50ML
900 INJECTION INTRAVENOUS
Status: DISCONTINUED | OUTPATIENT
Start: 2022-11-11 | End: 2022-11-11

## 2022-11-10 RX ORDER — OXYCODONE HYDROCHLORIDE 5 MG/1
10 TABLET ORAL EVERY 4 HOURS PRN
Status: DISCONTINUED | OUTPATIENT
Start: 2022-11-10 | End: 2022-11-12

## 2022-11-10 RX ADMIN — HYDROMORPHONE HYDROCHLORIDE 1 MG: 1 INJECTION, SOLUTION INTRAMUSCULAR; INTRAVENOUS; SUBCUTANEOUS at 14:38

## 2022-11-10 RX ADMIN — BACITRACIN: 500 OINTMENT TOPICAL at 13:45

## 2022-11-10 RX ADMIN — CLINDAMYCIN PHOSPHATE 600 MG: 600 INJECTION, SOLUTION INTRAVENOUS at 18:06

## 2022-11-10 RX ADMIN — IBUPROFEN 400 MG: 400 TABLET, FILM COATED ORAL at 18:03

## 2022-11-10 RX ADMIN — SODIUM CHLORIDE, POTASSIUM CHLORIDE, SODIUM LACTATE AND CALCIUM CHLORIDE: 600; 310; 30; 20 INJECTION, SOLUTION INTRAVENOUS at 00:14

## 2022-11-10 RX ADMIN — IBUPROFEN 400 MG: 400 TABLET, FILM COATED ORAL at 02:24

## 2022-11-10 RX ADMIN — Medication 0.2 MG/KG/HR: at 19:16

## 2022-11-10 RX ADMIN — OXYCODONE 10 MG: 5 TABLET ORAL at 22:32

## 2022-11-10 RX ADMIN — METHOCARBAMOL TABLETS 750 MG: 750 TABLET, COATED ORAL at 10:39

## 2022-11-10 RX ADMIN — GABAPENTIN 300 MG: 300 CAPSULE ORAL at 08:16

## 2022-11-10 RX ADMIN — HYDROMORPHONE HYDROCHLORIDE 1 MG: 1 INJECTION, SOLUTION INTRAMUSCULAR; INTRAVENOUS; SUBCUTANEOUS at 06:38

## 2022-11-10 RX ADMIN — SODIUM CHLORIDE, PRESERVATIVE FREE 20 ML: 5 INJECTION INTRAVENOUS at 10:40

## 2022-11-10 RX ADMIN — SODIUM CHLORIDE, POTASSIUM CHLORIDE, SODIUM LACTATE AND CALCIUM CHLORIDE: 600; 310; 30; 20 INJECTION, SOLUTION INTRAVENOUS at 06:04

## 2022-11-10 RX ADMIN — OXYCODONE 10 MG: 5 TABLET ORAL at 18:03

## 2022-11-10 RX ADMIN — HEPARIN SODIUM 5000 UNITS: 5000 INJECTION INTRAVENOUS; SUBCUTANEOUS at 13:44

## 2022-11-10 RX ADMIN — METHOCARBAMOL TABLETS 750 MG: 750 TABLET, COATED ORAL at 18:03

## 2022-11-10 RX ADMIN — ACETAMINOPHEN 1000 MG: 500 TABLET ORAL at 21:14

## 2022-11-10 RX ADMIN — OXYCODONE 10 MG: 5 TABLET ORAL at 08:15

## 2022-11-10 RX ADMIN — GABAPENTIN 300 MG: 300 CAPSULE ORAL at 13:46

## 2022-11-10 RX ADMIN — IBUPROFEN 400 MG: 400 TABLET, FILM COATED ORAL at 13:45

## 2022-11-10 RX ADMIN — MAGNESIUM SULFATE HEPTAHYDRATE 1000 MG: 1 INJECTION, SOLUTION INTRAVENOUS at 13:47

## 2022-11-10 RX ADMIN — SODIUM CHLORIDE, POTASSIUM CHLORIDE, SODIUM LACTATE AND CALCIUM CHLORIDE: 600; 310; 30; 20 INJECTION, SOLUTION INTRAVENOUS at 16:37

## 2022-11-10 RX ADMIN — HYDROMORPHONE HYDROCHLORIDE 1 MG: 1 INJECTION, SOLUTION INTRAMUSCULAR; INTRAVENOUS; SUBCUTANEOUS at 19:42

## 2022-11-10 RX ADMIN — GABAPENTIN 300 MG: 300 CAPSULE ORAL at 04:21

## 2022-11-10 RX ADMIN — CLINDAMYCIN PHOSPHATE 600 MG: 600 INJECTION, SOLUTION INTRAVENOUS at 10:42

## 2022-11-10 RX ADMIN — CLINDAMYCIN PHOSPHATE 600 MG: 600 INJECTION, SOLUTION INTRAVENOUS at 02:32

## 2022-11-10 RX ADMIN — POLYETHYLENE GLYCOL 3350 17 G: 17 POWDER, FOR SOLUTION ORAL at 08:15

## 2022-11-10 RX ADMIN — ACETAMINOPHEN 1000 MG: 500 TABLET ORAL at 05:48

## 2022-11-10 RX ADMIN — MAGNESIUM SULFATE HEPTAHYDRATE 1000 MG: 1 INJECTION, SOLUTION INTRAVENOUS at 12:22

## 2022-11-10 RX ADMIN — DOCUSATE SODIUM 50 MG AND SENNOSIDES 8.6 MG 1 TABLET: 8.6; 5 TABLET, FILM COATED ORAL at 08:17

## 2022-11-10 RX ADMIN — SODIUM CHLORIDE, PRESERVATIVE FREE 10 ML: 5 INJECTION INTRAVENOUS at 00:30

## 2022-11-10 RX ADMIN — ACETAMINOPHEN 1000 MG: 500 TABLET ORAL at 00:30

## 2022-11-10 RX ADMIN — GABAPENTIN 300 MG: 300 CAPSULE ORAL at 21:15

## 2022-11-10 RX ADMIN — METHOCARBAMOL TABLETS 750 MG: 750 TABLET, COATED ORAL at 02:24

## 2022-11-10 RX ADMIN — IBUPROFEN 400 MG: 400 TABLET, FILM COATED ORAL at 06:35

## 2022-11-10 RX ADMIN — IBUPROFEN 400 MG: 400 TABLET, FILM COATED ORAL at 22:28

## 2022-11-10 RX ADMIN — ACETAMINOPHEN 1000 MG: 500 TABLET ORAL at 13:44

## 2022-11-10 RX ADMIN — SODIUM CHLORIDE 1000 ML: 9 INJECTION, SOLUTION INTRAVENOUS at 08:06

## 2022-11-10 RX ADMIN — BACITRACIN: 500 OINTMENT TOPICAL at 08:15

## 2022-11-10 RX ADMIN — IBUPROFEN 400 MG: 400 TABLET, FILM COATED ORAL at 10:40

## 2022-11-10 RX ADMIN — OXYCODONE 10 MG: 5 TABLET ORAL at 03:23

## 2022-11-10 RX ADMIN — OXYCODONE 10 MG: 5 TABLET ORAL at 13:45

## 2022-11-10 RX ADMIN — HEPARIN SODIUM 5000 UNITS: 5000 INJECTION INTRAVENOUS; SUBCUTANEOUS at 21:15

## 2022-11-10 RX ADMIN — SODIUM CHLORIDE 1000 ML: 9 INJECTION, SOLUTION INTRAVENOUS at 12:24

## 2022-11-10 RX ADMIN — BACITRACIN: 500 OINTMENT TOPICAL at 21:14

## 2022-11-10 ASSESSMENT — PAIN SCALES - GENERAL
PAINLEVEL_OUTOF10: 10
PAINLEVEL_OUTOF10: 10
PAINLEVEL_OUTOF10: 8
PAINLEVEL_OUTOF10: 10
PAINLEVEL_OUTOF10: 8
PAINLEVEL_OUTOF10: 10
PAINLEVEL_OUTOF10: 10
PAINLEVEL_OUTOF10: 8
PAINLEVEL_OUTOF10: 0
PAINLEVEL_OUTOF10: 10
PAINLEVEL_OUTOF10: 8
PAINLEVEL_OUTOF10: 10
PAINLEVEL_OUTOF10: 10

## 2022-11-10 ASSESSMENT — PAIN DESCRIPTION - ORIENTATION
ORIENTATION: RIGHT;LEFT

## 2022-11-10 ASSESSMENT — PAIN DESCRIPTION - DESCRIPTORS
DESCRIPTORS: TIGHTNESS;PRESSURE;SHARP
DESCRIPTORS: ACHING;TIGHTNESS;SHARP
DESCRIPTORS: TIGHTNESS;ACHING
DESCRIPTORS: NUMBNESS;TIGHTNESS;TINGLING
DESCRIPTORS: ACHING;TIGHTNESS
DESCRIPTORS: TIGHTNESS;SHARP
DESCRIPTORS: STABBING;TIGHTNESS

## 2022-11-10 ASSESSMENT — PAIN DESCRIPTION - LOCATION
LOCATION: HAND
LOCATION: ARM;LEG
LOCATION: ARM
LOCATION: ARM;LEG

## 2022-11-10 ASSESSMENT — PAIN - FUNCTIONAL ASSESSMENT: PAIN_FUNCTIONAL_ASSESSMENT: PREVENTS OR INTERFERES SOME ACTIVE ACTIVITIES AND ADLS
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
## 2022-11-10 NOTE — PROGRESS NOTES
11/10/22 1624   Oxygen Therapy/Pulse Ox   O2 Therapy Oxygen humidified   O2 Device (S)  Heated high flow cannula   O2 Flow Rate (L/min) 40 L/min   FiO2  50 %   Heart Rate 92   Resp 23   SpO2 100 %     Patient placed on HHFNC per physician request.   Patient education given by Lisa Hills. Patient noted with Snoring while sleeping with desaturation to 87%. Will continue to monitor.

## 2022-11-10 NOTE — PROGRESS NOTES
Dr. Cassius Levy given update on patient's condition,  No new orders received Dr. Cassius Levy stated patient can D/C to [facility]

## 2022-11-10 NOTE — CARE COORDINATION
Trauma Care Coordination     Met with the patient and fiance at the bedside. Provided DANIEL HUNT paper work. Patient and fiance filling it out then will let the RN know when complete. Provided FAX # [phone] for when complete.
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
## 2022-11-10 NOTE — PLAN OF CARE
Problem: Discharge Planning  Goal: Discharge to home or other facility with appropriate resources  11/10/2022 1059 by Jennifer Jacobson RN  Outcome: Progressing  11/10/2022 0439 by April Sharma RN  Outcome: Progressing     Problem: Pain  Goal: Verbalizes/displays adequate comfort level or baseline comfort level  11/10/2022 1059 by Jennifer Jacobson RN  Outcome: Progressing  11/10/2022 0439 by April Sharma RN  Outcome: Progressing

## 2022-11-10 NOTE — PROGRESS NOTES
SPIRITUAL CARE DEPARTMENT - [facility]  PROGRESS NOTE    Shift date: 11/9/22  Shift day: Wednesday   Shift # 2    Room # RICHY/RICHY   Name: Carmita Henderson                Voodoo:    Place of Quaker:     Referral:  Nurse Request    Admit Date & Time: 11/9/2022  7:53 AM    Assessment:  Carmita Henderson is a 38 y.o. male     Intervention:  Writer introduced self and title as  to family in waiting area.  was a ministry of presence allowing space for feelings/emotions. Family engaged in conversation with .  escorted family to surgical waiting area and contacted surgical area of family's presence in waiting room.  communicated with family patient did not have surgical number at this time. Outcome:  Family expressed gratitude to  and continue coping and remain hopeful over patient. Plan:  Chaplains will remain available to offer spiritual and emotional support as needed.       Electronically signed by Reilly Friend on 11/9/2022 at 7:44 PM.  [facility]  [phone]

## 2022-11-10 NOTE — PROGRESS NOTES
PROGRESS NOTE    PATIENT NAME: Vitor Persaud  MEDICAL RECORD NO. [id number]  DATE: 11/10/2022  SURGEON: Dr. Lavonne Felder: No primary care provider on file. HD: # 1    ASSESSMENT    Patient Active Problem List   Diagnosis    Closed subcapital fracture of femur, left, initial encounter ([facility] [address]    Fracture of left clavicle    Fracture of left fibula    Tibial plateau fracture, left    Foreign body in left lower extremity     MEDICAL DECISION MAKING AND PLAN    Left fibula fx, L open fibula fx, L tibial plateau fx   -s/p IMN, I&D of LLE     L clavicle fx   -Plan for OR repair      Monitoring bl forearm firmness closely for compartment syndrome   -Motor intact, pulses intact     New onset R hand decreased sensation overnight   -Neurosurg cs, f/u recs        Trend CK (27,840) and siri (11,935), trend   Trend creat 1.47 (1.18). 1 L bolus this AM  Strict I/O's - goal UOP 1cc/kg/h. Miller placement. Repeat BMP, CK, and siri tonight     Encourage IS - will add HFNC today as atelectasis on initial CT and requiring NC   NPO, sips with meds  Multimodal pain therapy   DVT ppx: heparin TID       SUBJECTIVE    Vitor Persaud is seen and examined. Afebrile, VSS. On 2L. UOP 0.5cc/kg/h. 1L bolus this morning. No nausea or emesis. New onset R hand numbness overnight.        OBJECTIVE  VITALS: Temp: Temp: 98.6 °F (37 °C)Temp  Av.7 °F (36.5 °C)  Min: 96.8 °F (36 °C)  Max: 98.6 °F (37 °C) BP Systolic (23LMT), QVP:552 , Min:93 , GCR:215   Diastolic (80RWB), DBW:87, Min:56, Max:88   Pulse Pulse  Av.4  Min: 95  Max: 112 Resp Resp  Av.9  Min: 15  Max: 34 Pulse ox SpO2  Av.7 %  Min: 91 %  Max: 100 %  GENERAL: alert, no distress  NEURO: CNII-XII grossly intact, no focal neurological deficits    HEENT: atraumatic, normocephalic  LUNGS: slightly increased work of breathing on NC, no accessory muscle use   HEART: regular rate and rhythm   ABDOMEN: soft, nontender, nondistended  EXTREMITY: R hand with decreased sensation/numbness, is able to move bl hands, firmness bl forearms but compressible, palpable radial pulses. LLE with dressing intact, motor and sensation intact to not wrapped portions of lower extremities     I/O last 3 completed shifts: In: 5894.5 [I.V.:5083.7; Blood:810.8]  Out: 8270 [Urine:1300; Drains:50; Blood:100]    Drain/tube output: In: 5914.5 [I.V.:5103.7;  Blood:810.8]  Out: 2125 [Urine:1975; Drains:50]    LAB:  CBC:   Recent Labs     11/09/22  0814 11/09/22  1530 11/10/22  0623   WBC 30.0* 21.3* 15.7*   HGB 14.9 13.9 9.9*   HCT 44.4 40.8 29.0*   MCV 93.5 91.5 90.9    187 See Reflexed IPF Result     BMP:   Recent Labs     11/09/22  0814 11/10/22  0623    132*   K 3.4* 4.7    101   CO2 22 19*   BUN 19 26*   CREATININE 1.18 1.47*   GLUCOSE 196* 130*     COAGS:   Recent Labs     11/09/22 0814   APTT 20.3*   INR 1.0       RADIOLOGY:    MRI cervical spine pending      Moises Boast, DO  General Surgery PGY-4

## 2022-11-10 NOTE — ANESTHESIA POSTPROCEDURE EVALUATION
Department of Anesthesiology  Postprocedure Note    Patient: Ollie Arrieta  MRN: [id number]  [place]: 1980  Date of evaluation: 11/9/2022      Procedure Summary     Date: 11/09/22 Room / Location: [facility] 18 / [facility]    Anesthesia Start: [facility] Anesthesia Stop: 2998    Procedure: IM NAIL FRACTURE LEFT FEMUR, REMOVAL OF IMPLANT LEFT FEMURE, IRRIGATION EXCISIONAL DEBRIDEMENT OPEN FRACTURE LEFT LEG SKIN TO AND INCLUDING BONE, APPLICATION OF WOUND VAC, STRESS EXAM LEFT ANKLE UNDER ANESTHESIA (Left) Diagnosis:       Type I or II open fracture of shaft of left fibula, unspecified fracture morphology, initial encounter      (LEFT OPEN FIBULA AND LEFT FEMUR FRACTURE)    Surgeons: Ladarius Monson DO Responsible Provider: Alma Miranda MD    Anesthesia Type: general ASA Status: 2 - Emergent          Anesthesia Type: No value filed.     Cynthia Phase I: Cynthia Score: 6    Cynthia Phase II:        Anesthesia Post Evaluation    Patient location during evaluation: PACU  Patient participation: complete - patient participated  Level of consciousness: awake and alert  Pain score: 4  Airway patency: patent  Nausea & Vomiting: no vomiting and no nausea  Complications: no  Cardiovascular status: hemodynamically stable  Respiratory status: acceptable  Hydration status: stable

## 2022-11-10 NOTE — PROGRESS NOTES
Physical Therapy        Physical Therapy Cancel Note      DATE: 11/10/2022    NAME: Kenna Perkins  MRN: [id number]   : 1980      Patient not seen this date for Physical Therapy due to: Other: pt off unit at MRI with nursing staff per NS this AM. PT will continue to follow with POC and check back this PM as time allows.        Electronically signed by Esteban Singh PT on 11/10/2022 at 9:45 AM

## 2022-11-10 NOTE — PROGRESS NOTES
Patient transferred to room 402 per bed with portable O2 tank by RN's family present during transport

## 2022-11-10 NOTE — PROGRESS NOTES
Orthopedic Progress Note    Patient:  Mohan Berger, 38 y.o. male  YOB: 1980       Subjective:  Patient seen and examined. No complaints or concerns. Pain controlled on current regimen. Issues overnight were new onset upper extremity dysesthesias after arriving to floor room, neurosurgery aware. Denies fever, HA, CP, SOB, N/V. Diet OK from orthopedic perspective. Objective:   Vitals:    11/10/22 0328   BP: 119/63   Pulse: 95   Resp:    Temp:    SpO2:      Gen: NAD, cooperative    Cardiovascular: Regular rate    Respiratory: Symmetric chest rise. No accessory muscle use      LLE:  Dressings/Ace on, d/I. Mild saturation of proximal thigh optifoam under ACE. WoundVac on maintaining adequate suction. Minimal output (20cc since placement). Moderately TTP about the lower leg. Compartments soft. 2+ DP pulse. TA/EHL/FHL/GS motor intact. Deep and Superficial Peroneal/Saphenous/Sural/Plantar SILT. LUE: Skin intact with significant ecchymoses and abrasions. TTP over clavicle. Compartments firm yet easily compressible of arm, forearm, and hand. 2+ rad pulse. Median/Radial/Ulnar/AIN/PIN motor intact yet diminished. Median/Radial/Ulnar nerve SILT yet diminished. RUE:  Skin intact with significant ecchymoses and abrasions. TTP over clavicle. Compartments firm yet easily compressible of arm, forearm, and hand. 2+ rad pulse. Median/Radial/Ulnar/AIN/PIN motor intact yet diminished. Median/Radial/Ulnar nerve SILT yet diminished.      Recent Labs     11/09/22  0814 11/09/22  1530   WBC 30.0* 21.3*   HGB 14.9 13.9   HCT 44.4 40.8    187   INR 1.0  --      --    K 3.4*  --    BUN 19  --    CREATININE 1.18  --    GLUCOSE 196*  --         Abx: Clindamycin  See med rec for complete list    Impression/Plan: 38 y.o. male being seen for    - Left femur fracture s/p IMN, POD#1  - Left open fibula fracture s/p I&D, POD#1  - Left tibial plateau fracture  - Left clavicle fracture     Plan  - NWB to

## 2022-11-10 NOTE — PROGRESS NOTES
Orthopedic Progress Note    Patient:  Dodie Martinez  YOB: 1980     38 y.o. male    Subjective:  Patient seen and examined at bedside. Patient expressing a lack of sensation in his upper extremities, primarily his hands. No issue overnight. Pain controlled. Denies fever, HA, CP, SOB, N/V, dysuria. Negative BM, Negative flatus since surgery. PT/OT today, and after tomorrow's anticipated surgery. Vitals reviewed, afebrile    Objective:   Vitals:    11/10/22 0328   BP: 119/63   Pulse: 95   Resp:    Temp:    SpO2:      Gen: NAD, Cooperative. Cardiovascular: Regular Rate  Respiratory: No Acute Respiratory Distress  MSK:  LLE   Wound vac on, maintaining suction:  50 cc of serosanguinous output this morning. Optifoam dressings on, clean/dry/intact. Appropriately tender about the leg. Exposed compartments soft. EHL/FHL/TA/GSC Motor Intact. Sural, Saphenous, Superficial/Deep Peroneal, and Plantar Nerve Distribution SILT. DP and PT Pulses 2+ with BCR. BUE  Scattered superficial abrasions about BUE shoulder/arms. Mild tenderness about left clavicle. Compartments firm B/L, but compressible B/L. Decreased sensation to light tough about median/ulnar and radial distributions of the B/L hands. Sensation grossly intact above B/L wrists. Motor grossly intact B/L. Hands are warm and well perfused, Radial Pulse 2+ with BCR. Patient ranging shoulder, elbow, wrist and fingers without issues or significant pain. No pain with passive movement at any compartment.     Recent Labs     11/09/22  0814 11/09/22  1530   WBC 30.0* 21.3*   HGB 14.9 13.9   HCT 44.4 40.8    187   INR 1.0  --      --    K 3.4*  --    BUN 19  --    CREATININE 1.18  --    GLUCOSE 196*  --       Meds: See Rec for Complete List    Impression 38 y.o. male being seen for  - Left femur fracture s/p IMN, POD#1  - Left open fibula fracture s/p I&D, POD#1  - Left tibial plateau fracture  - Left clavicle fracture    Plan  - NWB to LUE and LLE  - Sling to LUE for comfort, ok to remove for hygiene. - To OR with Dr. Germaine Mayo tomorrow for ORIF of clavicle. - DVT ppx: please hold in anticipation for OR tomorrow  - Dressings C/D/I  - Woundvac on, please monitor and record output each shift.  - NPO at midnight, Clindamycin 900mg OCTOR for tomorrow  - Neurology to evaluate BUE dysesthesias. - Please page ortho with any questions  _________________________________  Hannah Song D.O.   Orthopedic Surgery Resident, PGY-1  [facility]
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
## 2022-11-11 ENCOUNTER — APPOINTMENT (OUTPATIENT)
Dept: GENERAL RADIOLOGY | Age: 42
DRG: 463 | End: 2022-11-11
Payer: COMMERCIAL

## 2022-11-11 ENCOUNTER — ANESTHESIA EVENT (OUTPATIENT)
Dept: OPERATING ROOM | Age: 42
DRG: 463 | End: 2022-11-11
Payer: COMMERCIAL

## 2022-11-11 ENCOUNTER — ANESTHESIA (OUTPATIENT)
Dept: OPERATING ROOM | Age: 42
DRG: 463 | End: 2022-11-11
Payer: COMMERCIAL

## 2022-11-11 PROBLEM — S82.122A CLOSED FRACTURE OF LATERAL PORTION OF LEFT TIBIAL PLATEAU: Status: ACTIVE | Noted: 2022-11-11

## 2022-11-11 PROBLEM — S52.101A CLOSED FRACTURE OF RIGHT PROXIMAL RADIUS: Status: ACTIVE | Noted: 2022-11-11

## 2022-11-11 PROBLEM — S52.122A CLOSED DISPLACED FRACTURE OF HEAD OF LEFT RADIUS: Status: ACTIVE | Noted: 2022-11-11

## 2022-11-11 PROBLEM — S72.352A CLOSED DISPLACED COMMINUTED FRACTURE OF SHAFT OF LEFT FEMUR (HCC): Status: ACTIVE | Noted: 2022-11-11
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
## 2022-11-11 PROBLEM — S86.012A ACHILLES RUPTURE, LEFT: Status: ACTIVE | Noted: 2022-11-11

## 2022-11-11 LAB
ABSOLUTE EOS #: <0.03 K/UL (ref 0–0.44)
ABSOLUTE EOS #: <0.03 K/UL (ref 0–0.44)
ABSOLUTE IMMATURE GRANULOCYTE: 0.07 K/UL (ref 0–0.3)
ABSOLUTE IMMATURE GRANULOCYTE: 0.2 K/UL (ref 0–0.3)
ABSOLUTE LYMPH #: 1.1 K/UL (ref 1.1–3.7)
ABSOLUTE LYMPH #: 1.53 K/UL (ref 1.1–3.7)
ABSOLUTE MONO #: 0.92 K/UL (ref 0.1–1.2)
ABSOLUTE MONO #: 1.44 K/UL (ref 0.1–1.2)
ALLEN TEST: ABNORMAL
ALLEN TEST: ABNORMAL
ALLEN TEST: POSITIVE
ANION GAP SERPL CALCULATED.3IONS-SCNC: 6 MMOL/L (ref 9–17)
ANION GAP SERPL CALCULATED.3IONS-SCNC: 8 MMOL/L (ref 9–17)
ANION GAP: 6 MMOL/L (ref 7–16)
ANION GAP: 8 MMOL/L (ref 7–16)
BASOPHILS # BLD: 0 % (ref 0–2)
BASOPHILS # BLD: 0 % (ref 0–2)
BASOPHILS ABSOLUTE: <0.03 K/UL (ref 0–0.2)
BASOPHILS ABSOLUTE: <0.03 K/UL (ref 0–0.2)
BUN BLDV-MCNC: 14 MG/DL (ref 6–20)
BUN BLDV-MCNC: 16 MG/DL (ref 6–20)
CALCIUM IONIZED: 1.04 MMOL/L (ref 1.13–1.33)
CALCIUM IONIZED: 1.15 MMOL/L (ref 1.13–1.33)
CALCIUM IONIZED: 1.17 MMOL/L (ref 1.13–1.33)
CALCIUM SERPL-MCNC: 7.2 MG/DL (ref 8.6–10.4)
CALCIUM SERPL-MCNC: 8 MG/DL (ref 8.6–10.4)
CARBOXYHEMOGLOBIN: 2.3 % (ref 0–5)
CHLORIDE BLD-SCNC: 101 MMOL/L (ref 98–107)
CHLORIDE BLD-SCNC: 99 MMOL/L (ref 98–107)
CHLORIDE, WHOLE BLOOD: 104 MMOL/L (ref 98–110)
CO2: 23 MMOL/L (ref 20–31)
CO2: 24 MMOL/L (ref 20–31)
CREAT SERPL-MCNC: 0.69 MG/DL (ref 0.7–1.2)
CREAT SERPL-MCNC: 0.94 MG/DL (ref 0.7–1.2)
EGFR, POC: >60 ML/MIN/1.73M2
EGFR, POC: >60 ML/MIN/1.73M2
EOSINOPHILS RELATIVE PERCENT: 0 % (ref 1–4)
EOSINOPHILS RELATIVE PERCENT: 0 % (ref 1–4)
FIO2: 100
FIO2: ABNORMAL
FIO2: NORMAL
GFR SERPL CREATININE-BSD FRML MDRD: >60 ML/MIN/1.73M2
GFR SERPL CREATININE-BSD FRML MDRD: >60 ML/MIN/1.73M2
GLUCOSE BLD-MCNC: 117 MG/DL (ref 75–110)
GLUCOSE BLD-MCNC: 119 MG/DL (ref 70–99)
GLUCOSE BLD-MCNC: 128 MG/DL (ref 74–100)
GLUCOSE BLD-MCNC: 134 MG/DL (ref 70–99)
GLUCOSE BLD-MCNC: 137 MG/DL (ref 74–100)
HCO3 ARTERIAL: ABNORMAL MMOL/L (ref 22–27)
HCO3 VENOUS: 26.1 MMOL/L (ref 24–30)
HCT VFR BLD CALC: 23.1 % (ref 40.7–50.3)
HCT VFR BLD CALC: 25.6 % (ref 40.7–50.3)
HCT VFR BLD CALC: 26.3 % (ref 40.7–50.3)
HEMOGLOBIN: 7.7 G/DL (ref 13–17)
HEMOGLOBIN: 8.2 GM/DL (ref 13–17)
HEMOGLOBIN: 8.9 G/DL (ref 13–17)
IMMATURE GRANULOCYTES: 1 %
IMMATURE GRANULOCYTES: 1 %
LYMPHOCYTES # BLD: 13 % (ref 24–43)
LYMPHOCYTES # BLD: 7 % (ref 24–43)
MAGNESIUM: 2.3 MG/DL (ref 1.6–2.6)
MAGNESIUM: 2.6 MG/DL (ref 1.6–2.6)
MCH RBC QN AUTO: 30.9 PG (ref 25.2–33.5)
MCH RBC QN AUTO: 31.4 PG (ref 25.2–33.5)
MCHC RBC AUTO-ENTMCNC: 33.3 G/DL (ref 28.4–34.8)
MCHC RBC AUTO-ENTMCNC: 33.8 G/DL (ref 28.4–34.8)
MCV RBC AUTO: 91.3 FL (ref 82.6–102.9)
MCV RBC AUTO: 94.3 FL (ref 82.6–102.9)
MONOCYTES # BLD: 12 % (ref 3–12)
MONOCYTES # BLD: 6 % (ref 3–12)
MYOGLOBIN: 3590 NG/ML (ref 28–72)
NRBC AUTOMATED: 0 PER 100 WBC
NRBC AUTOMATED: 0.2 PER 100 WBC
O2 SAT, ARTERIAL: ABNORMAL % (ref 94–100)
O2 SAT, VEN: 70.7 % (ref 60–85)
PATIENT TEMP: 36.6
PATIENT TEMP: 36.7
PATIENT TEMP: ABNORMAL
PCO2 ARTERIAL: ABNORMAL MMHG (ref 32–45)
PCO2, ART, TEMP ADJ: ABNORMAL (ref 32–45)
PCO2, VEN: 50.5 MM HG (ref 39–55)
PDW BLD-RTO: 13.2 % (ref 11.8–14.4)
PDW BLD-RTO: 13.5 % (ref 11.8–14.4)
PH ARTERIAL: ABNORMAL (ref 7.35–7.45)
PH VENOUS: 7.33 (ref 7.32–7.42)
PH, ART, TEMP ADJ: ABNORMAL (ref 7.35–7.45)
PHOSPHORUS: 2.4 MG/DL (ref 2.5–4.5)
PHOSPHORUS: 3.6 MG/DL (ref 2.5–4.5)
PLATELET # BLD: ABNORMAL K/UL (ref 138–453)
PLATELET # BLD: ABNORMAL K/UL (ref 138–453)
PLATELET, FLUORESCENCE: 100 K/UL (ref 138–453)
PLATELET, FLUORESCENCE: 116 K/UL (ref 138–453)
PLATELET, IMMATURE FRACTION: 6.2 % (ref 1.1–10.3)
PLATELET, IMMATURE FRACTION: 7.7 % (ref 1.1–10.3)
PO2 ARTERIAL: ABNORMAL MMHG (ref 75–95)
PO2, ART, TEMP ADJ: ABNORMAL MMHG (ref 75–95)
PO2, VEN: 38.8 MM HG (ref 30–50)
POC BUN: 16 MG/DL (ref 8–26)
POC BUN: 17 MG/DL (ref 8–26)
POC CHLORIDE: 104 MMOL/L (ref 98–107)
POC CHLORIDE: 104 MMOL/L (ref 98–107)
POC CREATININE: 1.28 MG/DL (ref 0.51–1.19)
POC CREATININE: 1.3 MG/DL (ref 0.51–1.19)
POC HCO3: 26.2 MMOL/L (ref 21–28)
POC HCO3: 27.4 MMOL/L (ref 21–28)
POC HEMATOCRIT: 23 % (ref 41–53)
POC HEMATOCRIT: 25 % (ref 41–53)
POC HEMOGLOBIN: 7.8 G/DL (ref 13.5–17.5)
POC HEMOGLOBIN: 8.5 G/DL (ref 13.5–17.5)
POC IONIZED CALCIUM: 1.11 MMOL/L (ref 1.15–1.33)
POC IONIZED CALCIUM: 1.12 MMOL/L (ref 1.15–1.33)
POC LACTIC ACID: 1.19 MMOL/L (ref 0.56–1.39)
POC LACTIC ACID: 1.82 MMOL/L (ref 0.56–1.39)
POC O2 SATURATION: 100 % (ref 94–98)
POC O2 SATURATION: 85 % (ref 94–98)
POC PCO2: 41.7 MM HG (ref 35–48)
POC PCO2: 51.6 MM HG (ref 35–48)
POC PH: 7.33 (ref 7.35–7.45)
POC PH: 7.41 (ref 7.35–7.45)
POC PO2: 209.2 MM HG (ref 83–108)
POC PO2: 50.2 MM HG (ref 83–108)
POC POTASSIUM: 5.1 MMOL/L (ref 3.5–4.5)
POC POTASSIUM: 5.3 MMOL/L (ref 3.5–4.5)
POC SODIUM: 137 MMOL/L (ref 138–146)
POC SODIUM: 137 MMOL/L (ref 138–146)
POC TCO2: 26 MMOL/L (ref 22–30)
POC TCO2: 28 MMOL/L (ref 22–30)
POSITIVE BASE EXCESS, ART: 1 (ref 0–3)
POSITIVE BASE EXCESS, ART: 1 (ref 0–3)
POSITIVE BASE EXCESS, ART: ABNORMAL MMOL/L (ref 0–2)
POSITIVE BASE EXCESS, VEN: 0.5 MMOL/L (ref 0–2)
POTASSIUM SERPL-SCNC: 5 MMOL/L (ref 3.7–5.3)
POTASSIUM SERPL-SCNC: 5.4 MMOL/L (ref 3.7–5.3)
POTASSIUM, WHOLE BLOOD: 5.3 MMOL/L (ref 3.6–5)
RBC # BLD: 2.45 M/UL (ref 4.21–5.77)
RBC # BLD: 2.88 M/UL (ref 4.21–5.77)
SAMPLE SITE: ABNORMAL
SAMPLE SITE: ABNORMAL
SEG NEUTROPHILS: 74 % (ref 36–65)
SEG NEUTROPHILS: 85 % (ref 36–65)
SEGMENTED NEUTROPHILS ABSOLUTE COUNT: 12.88 K/UL (ref 1.5–8.1)
SEGMENTED NEUTROPHILS ABSOLUTE COUNT: 8.84 K/UL (ref 1.5–8.1)
SODIUM BLD-SCNC: 129 MMOL/L (ref 135–144)
SODIUM BLD-SCNC: 132 MMOL/L (ref 135–144)
SODIUM, WHOLE BLOOD: 133 MMOL/L (ref 136–145)
TOTAL CK: 9230 U/L (ref 39–308)
WBC # BLD: 11.9 K/UL (ref 3.5–11.3)
WBC # BLD: 15.1 K/UL (ref 3.5–11.3)

## 2022-11-11 PROCEDURE — 3700000001 HC ADD 15 MINUTES (ANESTHESIA): Performed by: STUDENT IN AN ORGANIZED HEALTH CARE EDUCATION/TRAINING PROGRAM

## 2022-11-11 PROCEDURE — 0PSB04Z REPOSITION LEFT CLAVICLE WITH INTERNAL FIXATION DEVICE, OPEN APPROACH: ICD-10-PCS | Performed by: STUDENT IN AN ORGANIZED HEALTH CARE EDUCATION/TRAINING PROGRAM

## 2022-11-11 PROCEDURE — 84100 ASSAY OF PHOSPHORUS: CPT

## 2022-11-11 PROCEDURE — 84132 ASSAY OF SERUM POTASSIUM: CPT

## 2022-11-11 PROCEDURE — 2580000003 HC RX 258: Performed by: ANESTHESIOLOGY

## 2022-11-11 PROCEDURE — 82947 ASSAY GLUCOSE BLOOD QUANT: CPT

## 2022-11-11 PROCEDURE — 3600000004 HC SURGERY LEVEL 4 BASE: Performed by: STUDENT IN AN ORGANIZED HEALTH CARE EDUCATION/TRAINING PROGRAM

## 2022-11-11 PROCEDURE — 3209999900 FLUORO FOR SURGICAL PROCEDURES

## 2022-11-11 PROCEDURE — P9016 RBC LEUKOCYTES REDUCED: HCPCS

## 2022-11-11 PROCEDURE — 82803 BLOOD GASES ANY COMBINATION: CPT

## 2022-11-11 PROCEDURE — 71045 X-RAY EXAM CHEST 1 VIEW: CPT

## 2022-11-11 PROCEDURE — 73130 X-RAY EXAM OF HAND: CPT

## 2022-11-11 PROCEDURE — 94002 VENT MGMT INPAT INIT DAY: CPT

## 2022-11-11 PROCEDURE — 2000000000 HC ICU R&B

## 2022-11-11 PROCEDURE — 2500000003 HC RX 250 WO HCPCS: Performed by: STUDENT IN AN ORGANIZED HEALTH CARE EDUCATION/TRAINING PROGRAM

## 2022-11-11 PROCEDURE — 27650 REPAIR ACHILLES TENDON: CPT | Performed by: STUDENT IN AN ORGANIZED HEALTH CARE EDUCATION/TRAINING PROGRAM

## 2022-11-11 PROCEDURE — 11960 INSERT TISSUE EXPANDER(S): CPT | Performed by: STUDENT IN AN ORGANIZED HEALTH CARE EDUCATION/TRAINING PROGRAM

## 2022-11-11 PROCEDURE — 2580000003 HC RX 258: Performed by: NURSE ANESTHETIST, CERTIFIED REGISTERED

## 2022-11-11 PROCEDURE — 85055 RETICULATED PLATELET ASSAY: CPT

## 2022-11-11 PROCEDURE — 85014 HEMATOCRIT: CPT

## 2022-11-11 PROCEDURE — 6360000002 HC RX W HCPCS: Performed by: NURSE ANESTHETIST, CERTIFIED REGISTERED

## 2022-11-11 PROCEDURE — 73090 X-RAY EXAM OF FOREARM: CPT

## 2022-11-11 PROCEDURE — 6360000002 HC RX W HCPCS

## 2022-11-11 PROCEDURE — 97606 NEG PRS WND THER DME>50 SQCM: CPT | Performed by: STUDENT IN AN ORGANIZED HEALTH CARE EDUCATION/TRAINING PROGRAM

## 2022-11-11 PROCEDURE — 2720000010 HC SURG SUPPLY STERILE: Performed by: STUDENT IN AN ORGANIZED HEALTH CARE EDUCATION/TRAINING PROGRAM

## 2022-11-11 PROCEDURE — 82565 ASSAY OF CREATININE: CPT

## 2022-11-11 PROCEDURE — 6360000002 HC RX W HCPCS: Performed by: STUDENT IN AN ORGANIZED HEALTH CARE EDUCATION/TRAINING PROGRAM

## 2022-11-11 PROCEDURE — 6370000000 HC RX 637 (ALT 250 FOR IP): Performed by: STUDENT IN AN ORGANIZED HEALTH CARE EDUCATION/TRAINING PROGRAM

## 2022-11-11 PROCEDURE — 80048 BASIC METABOLIC PNL TOTAL CA: CPT

## 2022-11-11 PROCEDURE — 83874 ASSAY OF MYOGLOBIN: CPT

## 2022-11-11 PROCEDURE — 83605 ASSAY OF LACTIC ACID: CPT

## 2022-11-11 PROCEDURE — 2580000003 HC RX 258: Performed by: STUDENT IN AN ORGANIZED HEALTH CARE EDUCATION/TRAINING PROGRAM

## 2022-11-11 PROCEDURE — 80051 ELECTROLYTE PANEL: CPT

## 2022-11-11 PROCEDURE — 0PSH04Z REPOSITION RIGHT RADIUS WITH INTERNAL FIXATION DEVICE, OPEN APPROACH: ICD-10-PCS | Performed by: STUDENT IN AN ORGANIZED HEALTH CARE EDUCATION/TRAINING PROGRAM

## 2022-11-11 PROCEDURE — 86900 BLOOD TYPING SEROLOGIC ABO: CPT

## 2022-11-11 PROCEDURE — 85025 COMPLETE CBC W/AUTO DIFF WBC: CPT

## 2022-11-11 PROCEDURE — 82805 BLOOD GASES W/O2 SATURATION: CPT

## 2022-11-11 PROCEDURE — 6370000000 HC RX 637 (ALT 250 FOR IP)

## 2022-11-11 PROCEDURE — 82550 ASSAY OF CK (CPK): CPT

## 2022-11-11 PROCEDURE — 2500000003 HC RX 250 WO HCPCS: Performed by: NURSE ANESTHETIST, CERTIFIED REGISTERED

## 2022-11-11 PROCEDURE — 2580000003 HC RX 258

## 2022-11-11 PROCEDURE — C1713 ANCHOR/SCREW BN/BN,TIS/BN: HCPCS | Performed by: STUDENT IN AN ORGANIZED HEALTH CARE EDUCATION/TRAINING PROGRAM

## 2022-11-11 PROCEDURE — 85018 HEMOGLOBIN: CPT

## 2022-11-11 PROCEDURE — 73070 X-RAY EXAM OF ELBOW: CPT

## 2022-11-11 PROCEDURE — A4216 STERILE WATER/SALINE, 10 ML: HCPCS | Performed by: NURSE ANESTHETIST, CERTIFIED REGISTERED

## 2022-11-11 PROCEDURE — 36600 WITHDRAWAL OF ARTERIAL BLOOD: CPT

## 2022-11-11 PROCEDURE — 25515 OPTX RADIAL SHAFT FRACTURE: CPT | Performed by: STUDENT IN AN ORGANIZED HEALTH CARE EDUCATION/TRAINING PROGRAM

## 2022-11-11 PROCEDURE — 73000 X-RAY EXAM OF COLLAR BONE: CPT

## 2022-11-11 PROCEDURE — 2709999900 HC NON-CHARGEABLE SUPPLY: Performed by: STUDENT IN AN ORGANIZED HEALTH CARE EDUCATION/TRAINING PROGRAM

## 2022-11-11 PROCEDURE — 94761 N-INVAS EAR/PLS OXIMETRY MLT: CPT

## 2022-11-11 PROCEDURE — 36415 COLL VENOUS BLD VENIPUNCTURE: CPT

## 2022-11-11 PROCEDURE — 83735 ASSAY OF MAGNESIUM: CPT

## 2022-11-11 PROCEDURE — 27784 TREATMENT OF FIBULA FRACTURE: CPT | Performed by: STUDENT IN AN ORGANIZED HEALTH CARE EDUCATION/TRAINING PROGRAM

## 2022-11-11 PROCEDURE — 3600000014 HC SURGERY LEVEL 4 ADDTL 15MIN: Performed by: STUDENT IN AN ORGANIZED HEALTH CARE EDUCATION/TRAINING PROGRAM

## 2022-11-11 PROCEDURE — 82330 ASSAY OF CALCIUM: CPT

## 2022-11-11 PROCEDURE — 36556 INSERT NON-TUNNEL CV CATH: CPT

## 2022-11-11 PROCEDURE — 84520 ASSAY OF UREA NITROGEN: CPT

## 2022-11-11 PROCEDURE — 2500000003 HC RX 250 WO HCPCS

## 2022-11-11 PROCEDURE — 23515 OPTX CLAVICULAR FX W/INT FIX: CPT | Performed by: STUDENT IN AN ORGANIZED HEALTH CARE EDUCATION/TRAINING PROGRAM

## 2022-11-11 PROCEDURE — 24650 CLTX RDL HEAD/NCK FX WO MNPJ: CPT | Performed by: STUDENT IN AN ORGANIZED HEALTH CARE EDUCATION/TRAINING PROGRAM

## 2022-11-11 PROCEDURE — 74018 RADEX ABDOMEN 1 VIEW: CPT

## 2022-11-11 PROCEDURE — 73590 X-RAY EXAM OF LOWER LEG: CPT

## 2022-11-11 PROCEDURE — 2700000000 HC OXYGEN THERAPY PER DAY

## 2022-11-11 PROCEDURE — 3700000000 HC ANESTHESIA ATTENDED CARE: Performed by: STUDENT IN AN ORGANIZED HEALTH CARE EDUCATION/TRAINING PROGRAM

## 2022-11-11 DEVICE — SCREW BNE L28MM DIA2.7MM S STL ST VAR ANG LOK FULL THRD T8: Type: IMPLANTABLE DEVICE | Site: CLAVICLE | Status: FUNCTIONAL

## 2022-11-11 DEVICE — SCREW BNE L18MM DIA3.5MM CORT S STL ST LOK FULL THRD: Type: IMPLANTABLE DEVICE | Site: RADIUS | Status: FUNCTIONAL

## 2022-11-11 DEVICE — PLATE BNE L52MM 7 H BILAT S STL LOK COMPR LO PROF FOR 2MM: Type: IMPLANTABLE DEVICE | Site: CLAVICLE | Status: FUNCTIONAL

## 2022-11-11 DEVICE — SCREW BNE L16MM DIA3.5MM CORT S STL ST NONCANNULATED LOK: Type: IMPLANTABLE DEVICE | Site: RADIUS | Status: FUNCTIONAL

## 2022-11-11 DEVICE — SCREW CORTES 3.5MM SELF-TAPPING 18MM: Type: IMPLANTABLE DEVICE | Site: RADIUS | Status: FUNCTIONAL

## 2022-11-11 DEVICE — SCREW BNE L10MM DIA2MM CORT S STL ST LOK FULL THRD T6: Type: IMPLANTABLE DEVICE | Site: CLAVICLE | Status: FUNCTIONAL

## 2022-11-11 DEVICE — SCREW BNE L24MM DIA2.7MM ANK S STL ST VAR ANG LOK FULL THRD: Type: IMPLANTABLE DEVICE | Site: CLAVICLE | Status: FUNCTIONAL

## 2022-11-11 DEVICE — PLATE BNE L98MM THK3.4MM 7 H BILAT S STL STR LOK COMPR FOR: Type: IMPLANTABLE DEVICE | Site: RADIUS | Status: FUNCTIONAL

## 2022-11-11 DEVICE — SCREW BNE L16MM DIA3.5MM CORT S STL ST LOK FULL THRD T15: Type: IMPLANTABLE DEVICE | Site: CLAVICLE | Status: FUNCTIONAL

## 2022-11-11 DEVICE — IMPLANTABLE DEVICE: Type: IMPLANTABLE DEVICE | Site: CLAVICLE | Status: FUNCTIONAL

## 2022-11-11 DEVICE — SCREW BNE ST 2X10 MM CRTX LCK W/ STARDRV RECESS SS NS LCP: Type: IMPLANTABLE DEVICE | Site: CLAVICLE | Status: FUNCTIONAL

## 2022-11-11 DEVICE — SCREW BNE L18MM DIA3.5MM CORT S STL ST LOK FULL THRD T15: Type: IMPLANTABLE DEVICE | Site: CLAVICLE | Status: FUNCTIONAL

## 2022-11-11 DEVICE — NAIL IM ELASTIC 2X440 MM TI GRN NS
Type: IMPLANTABLE DEVICE | Site: FIBULA | Status: NON-FUNCTIONAL
Removed: 2022-11-15

## 2022-11-11 DEVICE — SCREW BNE L26MM DIA3.5MM HD DIA6MM CORT S STL NONCANNULATED: Type: IMPLANTABLE DEVICE | Site: CLAVICLE | Status: FUNCTIONAL

## 2022-11-11 DEVICE — SCREW BNE L8MM DIA2MM CORT S STL ST LOK FULL THRD T6: Type: IMPLANTABLE DEVICE | Site: CLAVICLE | Status: FUNCTIONAL

## 2022-11-11 DEVICE — SCREW BNE L22MM DIA2.7MM ANK S STL ST VAR ANG LOK FULL THRD: Type: IMPLANTABLE DEVICE | Site: CLAVICLE | Status: FUNCTIONAL

## 2022-11-11 DEVICE — SCREW BNE L20MM DIA3.5MM CORT S STL ST FULL THRD LOK T15: Type: IMPLANTABLE DEVICE | Site: CLAVICLE | Status: FUNCTIONAL

## 2022-11-11 DEVICE — SCREW BNE L22MM DIA2.7MM MTPHSEAL S STL ST FULL THRD T8: Type: IMPLANTABLE DEVICE | Site: CLAVICLE | Status: FUNCTIONAL

## 2022-11-11 RX ORDER — SODIUM CHLORIDE 9 MG/ML
INJECTION, SOLUTION INTRAVENOUS CONTINUOUS
Status: DISCONTINUED | OUTPATIENT
Start: 2022-11-11 | End: 2022-11-14

## 2022-11-11 RX ORDER — LIDOCAINE HYDROCHLORIDE 10 MG/ML
INJECTION, SOLUTION EPIDURAL; INFILTRATION; INTRACAUDAL; PERINEURAL PRN
Status: DISCONTINUED | OUTPATIENT
Start: 2022-11-11 | End: 2022-11-11 | Stop reason: SDUPTHER

## 2022-11-11 RX ORDER — PROPOFOL 10 MG/ML
5-50 INJECTION, EMULSION INTRAVENOUS CONTINUOUS
Status: DISCONTINUED | OUTPATIENT
Start: 2022-11-11 | End: 2022-11-12

## 2022-11-11 RX ORDER — DEXTROSE MONOHYDRATE 100 MG/ML
INJECTION, SOLUTION INTRAVENOUS CONTINUOUS PRN
Status: DISCONTINUED | OUTPATIENT
Start: 2022-11-11 | End: 2022-11-26 | Stop reason: HOSPADM

## 2022-11-11 RX ORDER — ROCURONIUM BROMIDE 10 MG/ML
INJECTION, SOLUTION INTRAVENOUS PRN
Status: DISCONTINUED | OUTPATIENT
Start: 2022-11-11 | End: 2022-11-11 | Stop reason: SDUPTHER

## 2022-11-11 RX ORDER — CALCIUM GLUCONATE 20 MG/ML
1000 INJECTION, SOLUTION INTRAVENOUS ONCE
Status: COMPLETED | OUTPATIENT
Start: 2022-11-11 | End: 2022-11-12

## 2022-11-11 RX ORDER — DEXTROSE AND SODIUM CHLORIDE 5; .45 G/100ML; G/100ML
INJECTION, SOLUTION INTRAVENOUS CONTINUOUS
Status: DISCONTINUED | OUTPATIENT
Start: 2022-11-11 | End: 2022-11-11

## 2022-11-11 RX ORDER — VANCOMYCIN HYDROCHLORIDE 1 G/20ML
INJECTION, POWDER, LYOPHILIZED, FOR SOLUTION INTRAVENOUS PRN
Status: DISCONTINUED | OUTPATIENT
Start: 2022-11-11 | End: 2022-11-11 | Stop reason: ALTCHOICE

## 2022-11-11 RX ORDER — PHENYLEPHRINE HCL IN 0.9% NACL 1 MG/10 ML
SYRINGE (ML) INTRAVENOUS PRN
Status: DISCONTINUED | OUTPATIENT
Start: 2022-11-11 | End: 2022-11-11 | Stop reason: SDUPTHER

## 2022-11-11 RX ORDER — TOBRAMYCIN 1.2 G/30ML
INJECTION, POWDER, LYOPHILIZED, FOR SOLUTION INTRAVENOUS PRN
Status: DISCONTINUED | OUTPATIENT
Start: 2022-11-11 | End: 2022-11-11 | Stop reason: ALTCHOICE

## 2022-11-11 RX ORDER — PROPOFOL 10 MG/ML
INJECTION, EMULSION INTRAVENOUS PRN
Status: DISCONTINUED | OUTPATIENT
Start: 2022-11-11 | End: 2022-11-11 | Stop reason: SDUPTHER

## 2022-11-11 RX ORDER — HYDRALAZINE HYDROCHLORIDE 20 MG/ML
10 INJECTION INTRAMUSCULAR; INTRAVENOUS EVERY 6 HOURS PRN
Status: DISCONTINUED | OUTPATIENT
Start: 2022-11-11 | End: 2022-11-26 | Stop reason: HOSPADM

## 2022-11-11 RX ORDER — CHLORHEXIDINE GLUCONATE 0.12 MG/ML
15 RINSE ORAL 2 TIMES DAILY
Status: DISCONTINUED | OUTPATIENT
Start: 2022-11-11 | End: 2022-11-13

## 2022-11-11 RX ORDER — SODIUM CHLORIDE 9 MG/ML
INJECTION INTRAVENOUS PRN
Status: DISCONTINUED | OUTPATIENT
Start: 2022-11-11 | End: 2022-11-11 | Stop reason: SDUPTHER

## 2022-11-11 RX ORDER — LABETALOL HYDROCHLORIDE 5 MG/ML
10 INJECTION, SOLUTION INTRAVENOUS EVERY 6 HOURS PRN
Status: DISCONTINUED | OUTPATIENT
Start: 2022-11-11 | End: 2022-11-26 | Stop reason: HOSPADM

## 2022-11-11 RX ORDER — MAGNESIUM HYDROXIDE 1200 MG/15ML
LIQUID ORAL CONTINUOUS PRN
Status: COMPLETED | OUTPATIENT
Start: 2022-11-11 | End: 2022-11-11

## 2022-11-11 RX ORDER — DEXAMETHASONE SODIUM PHOSPHATE 10 MG/ML
INJECTION INTRAMUSCULAR; INTRAVENOUS PRN
Status: DISCONTINUED | OUTPATIENT
Start: 2022-11-11 | End: 2022-11-11 | Stop reason: SDUPTHER

## 2022-11-11 RX ORDER — PROPOFOL 10 MG/ML
INJECTION, EMULSION INTRAVENOUS CONTINUOUS PRN
Status: DISCONTINUED | OUTPATIENT
Start: 2022-11-11 | End: 2022-11-11 | Stop reason: SDUPTHER

## 2022-11-11 RX ORDER — FENTANYL CITRATE 50 UG/ML
INJECTION, SOLUTION INTRAMUSCULAR; INTRAVENOUS PRN
Status: DISCONTINUED | OUTPATIENT
Start: 2022-11-11 | End: 2022-11-11 | Stop reason: SDUPTHER

## 2022-11-11 RX ORDER — MIDAZOLAM HYDROCHLORIDE 1 MG/ML
INJECTION INTRAMUSCULAR; INTRAVENOUS PRN
Status: DISCONTINUED | OUTPATIENT
Start: 2022-11-11 | End: 2022-11-11 | Stop reason: SDUPTHER

## 2022-11-11 RX ORDER — SODIUM CHLORIDE, SODIUM LACTATE, POTASSIUM CHLORIDE, CALCIUM CHLORIDE 600; 310; 30; 20 MG/100ML; MG/100ML; MG/100ML; MG/100ML
INJECTION, SOLUTION INTRAVENOUS CONTINUOUS PRN
Status: DISCONTINUED | OUTPATIENT
Start: 2022-11-11 | End: 2022-11-11 | Stop reason: SDUPTHER

## 2022-11-11 RX ADMIN — IBUPROFEN 400 MG: 400 TABLET, FILM COATED ORAL at 02:28

## 2022-11-11 RX ADMIN — SODIUM CHLORIDE: 9 INJECTION, SOLUTION INTRAVENOUS at 22:47

## 2022-11-11 RX ADMIN — CLINDAMYCIN PHOSPHATE 600 MG: 600 INJECTION, SOLUTION INTRAVENOUS at 03:19

## 2022-11-11 RX ADMIN — CALCIUM GLUCONATE 1000 MG: 20 INJECTION, SOLUTION INTRAVENOUS at 23:32

## 2022-11-11 RX ADMIN — OXYCODONE 10 MG: 5 TABLET ORAL at 02:28

## 2022-11-11 RX ADMIN — SODIUM CHLORIDE, PRESERVATIVE FREE 20 MG: 5 INJECTION INTRAVENOUS at 23:08

## 2022-11-11 RX ADMIN — FENTANYL CITRATE 100 MCG: 0.05 INJECTION, SOLUTION INTRAMUSCULAR; INTRAVENOUS at 14:25

## 2022-11-11 RX ADMIN — CLINDAMYCIN PHOSPHATE 600 MG: 600 INJECTION, SOLUTION INTRAVENOUS at 09:13

## 2022-11-11 RX ADMIN — CALCIUM GLUCONATE 3000 MG: 98 INJECTION, SOLUTION INTRAVENOUS at 09:52

## 2022-11-11 RX ADMIN — PROPOFOL 40 MCG/KG/MIN: 10 INJECTION, EMULSION INTRAVENOUS at 18:48

## 2022-11-11 RX ADMIN — Medication 3 G: at 14:25

## 2022-11-11 RX ADMIN — METHOCARBAMOL TABLETS 750 MG: 750 TABLET, COATED ORAL at 02:28

## 2022-11-11 RX ADMIN — CLINDAMYCIN PHOSPHATE 600 MG: 600 INJECTION, SOLUTION INTRAVENOUS at 16:45

## 2022-11-11 RX ADMIN — CHLORHEXIDINE GLUCONATE 15 ML: 1.2 RINSE ORAL at 22:44

## 2022-11-11 RX ADMIN — LIDOCAINE HYDROCHLORIDE 50 MG: 10 INJECTION, SOLUTION EPIDURAL; INFILTRATION; INTRACAUDAL; PERINEURAL at 13:19

## 2022-11-11 RX ADMIN — ACETAMINOPHEN 1000 MG: 500 TABLET ORAL at 06:30

## 2022-11-11 RX ADMIN — HYDROMORPHONE HYDROCHLORIDE 1 MG: 1 INJECTION, SOLUTION INTRAMUSCULAR; INTRAVENOUS; SUBCUTANEOUS at 08:48

## 2022-11-11 RX ADMIN — ROCURONIUM BROMIDE 30 MG: 10 INJECTION, SOLUTION INTRAVENOUS at 14:20

## 2022-11-11 RX ADMIN — Medication 100 MCG: at 13:32

## 2022-11-11 RX ADMIN — PROPOFOL 50 MCG/KG/MIN: 10 INJECTION, EMULSION INTRAVENOUS at 23:30

## 2022-11-11 RX ADMIN — HYDROMORPHONE HYDROCHLORIDE 1 MG: 1 INJECTION, SOLUTION INTRAMUSCULAR; INTRAVENOUS; SUBCUTANEOUS at 04:23

## 2022-11-11 RX ADMIN — PHENYLEPHRINE HYDROCHLORIDE 30 MCG/MIN: 10 INJECTION INTRAVENOUS at 17:22

## 2022-11-11 RX ADMIN — SODIUM CHLORIDE 10 ML: 9 INJECTION, SOLUTION INTRAMUSCULAR; INTRAVENOUS; SUBCUTANEOUS at 13:48

## 2022-11-11 RX ADMIN — ROCURONIUM BROMIDE 10 MG: 10 INJECTION, SOLUTION INTRAVENOUS at 18:33

## 2022-11-11 RX ADMIN — ROCURONIUM BROMIDE 100 MG: 10 INJECTION, SOLUTION INTRAVENOUS at 13:19

## 2022-11-11 RX ADMIN — ONDANSETRON 4 MG: 2 INJECTION INTRAMUSCULAR; INTRAVENOUS at 08:48

## 2022-11-11 RX ADMIN — ROCURONIUM BROMIDE 10 MG: 10 INJECTION, SOLUTION INTRAVENOUS at 17:27

## 2022-11-11 RX ADMIN — OXYCODONE 10 MG: 5 TABLET ORAL at 06:30

## 2022-11-11 RX ADMIN — PROPOFOL 50 MCG/KG/MIN: 10 INJECTION, EMULSION INTRAVENOUS at 19:30

## 2022-11-11 RX ADMIN — ROCURONIUM BROMIDE 10 MG: 10 INJECTION, SOLUTION INTRAVENOUS at 16:32

## 2022-11-11 RX ADMIN — Medication 100 MCG: at 13:22

## 2022-11-11 RX ADMIN — BACITRACIN: 500 OINTMENT TOPICAL at 08:48

## 2022-11-11 RX ADMIN — ROCURONIUM BROMIDE 50 MG: 10 INJECTION, SOLUTION INTRAVENOUS at 19:05

## 2022-11-11 RX ADMIN — SODIUM CHLORIDE, PRESERVATIVE FREE 10 ML: 5 INJECTION INTRAVENOUS at 08:51

## 2022-11-11 RX ADMIN — FENTANYL CITRATE 100 MCG: 0.05 INJECTION, SOLUTION INTRAMUSCULAR; INTRAVENOUS at 13:19

## 2022-11-11 RX ADMIN — BACITRACIN: 500 OINTMENT TOPICAL at 22:46

## 2022-11-11 RX ADMIN — FENTANYL CITRATE 50 MCG: 0.05 INJECTION, SOLUTION INTRAMUSCULAR; INTRAVENOUS at 17:16

## 2022-11-11 RX ADMIN — DOCUSATE SODIUM 50 MG AND SENNOSIDES 8.6 MG 1 TABLET: 8.6; 5 TABLET, FILM COATED ORAL at 22:46

## 2022-11-11 RX ADMIN — ACETAMINOPHEN 1000 MG: 500 TABLET ORAL at 22:44

## 2022-11-11 RX ADMIN — Medication 100 MCG: at 17:28

## 2022-11-11 RX ADMIN — HYDRALAZINE HYDROCHLORIDE 10 MG: 20 INJECTION INTRAMUSCULAR; INTRAVENOUS at 21:04

## 2022-11-11 RX ADMIN — GABAPENTIN 300 MG: 300 CAPSULE ORAL at 22:46

## 2022-11-11 RX ADMIN — PROPOFOL 130 MG: 10 INJECTION, EMULSION INTRAVENOUS at 13:19

## 2022-11-11 RX ADMIN — Medication 200 MCG: at 13:37

## 2022-11-11 RX ADMIN — Medication 100 MCG: at 17:21

## 2022-11-11 RX ADMIN — DEXAMETHASONE SODIUM PHOSPHATE 10 MG: 10 INJECTION INTRAMUSCULAR; INTRAVENOUS at 13:30

## 2022-11-11 RX ADMIN — SODIUM CHLORIDE, POTASSIUM CHLORIDE, SODIUM LACTATE AND CALCIUM CHLORIDE: 600; 310; 30; 20 INJECTION, SOLUTION INTRAVENOUS at 18:43

## 2022-11-11 RX ADMIN — Medication 50 MCG/HR: at 21:17

## 2022-11-11 RX ADMIN — SODIUM CHLORIDE, POTASSIUM CHLORIDE, SODIUM LACTATE AND CALCIUM CHLORIDE: 600; 310; 30; 20 INJECTION, SOLUTION INTRAVENOUS at 13:55

## 2022-11-11 RX ADMIN — Medication 100 MCG: at 13:31

## 2022-11-11 RX ADMIN — IBUPROFEN 400 MG: 400 TABLET, FILM COATED ORAL at 22:46

## 2022-11-11 RX ADMIN — HYDROMORPHONE HYDROCHLORIDE 1 MG: 1 INJECTION, SOLUTION INTRAMUSCULAR; INTRAVENOUS; SUBCUTANEOUS at 20:49

## 2022-11-11 RX ADMIN — MIDAZOLAM 2 MG: 1 INJECTION INTRAMUSCULAR; INTRAVENOUS at 13:30

## 2022-11-11 RX ADMIN — ROCURONIUM BROMIDE 10 MG: 10 INJECTION, SOLUTION INTRAVENOUS at 14:57

## 2022-11-11 RX ADMIN — ROCURONIUM BROMIDE 10 MG: 10 INJECTION, SOLUTION INTRAVENOUS at 15:37

## 2022-11-11 RX ADMIN — HYDROMORPHONE HYDROCHLORIDE 1 MG: 1 INJECTION, SOLUTION INTRAMUSCULAR; INTRAVENOUS; SUBCUTANEOUS at 00:09

## 2022-11-11 ASSESSMENT — PAIN SCALES - GENERAL
PAINLEVEL_OUTOF10: 0
PAINLEVEL_OUTOF10: 8
PAINLEVEL_OUTOF10: 2
PAINLEVEL_OUTOF10: 1
PAINLEVEL_OUTOF10: 10
PAINLEVEL_OUTOF10: 4
PAINLEVEL_OUTOF10: 7
PAINLEVEL_OUTOF10: 10
PAINLEVEL_OUTOF10: 8

## 2022-11-11 ASSESSMENT — PULMONARY FUNCTION TESTS: PIF_VALUE: 25

## 2022-11-11 NOTE — PROGRESS NOTES
[address]up[address]  Occupational Therapy Not Seen Note    DATE: 2022    NAME: Claudette Clark  MRN: [id number]   : 1980      Patient not seen this date for Occupational Therapy due to:    Surgery/Procedure: Pt going for multiple ortho surgeries today, Will follow post op.     Electronically signed by CHRISTIAN Tong on 2022 at 8:33 AM
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
## 2022-11-11 NOTE — PROGRESS NOTES
Physical Therapy Cancel Note      DATE: 2022    NAME: Braeden Villagran  MRN: [id number]   : 1980      Patient not seen this date for Physical Therapy due to:    Surgery/Procedure: pt to go for multiple ortho surgeries today; check status       Electronically signed by Susanne Sanford PT on 2022 at 8:10 AM
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
## 2022-11-11 NOTE — PROGRESS NOTES
Orthopedic Progress Note    Patient:  Leora Sever  YOB: 1980     38 y.o. male    Subjective:  Patient seen and examined this morning. No complaints or concerns. No issues overnight per nursing. Pain is well controlled on current regimen. Continues to have significant swelling in bilateral arms but is more comfortable than yesterday. Denies fever, HA, CP, SOB, N/V, dysuria, new numbness/tingling. MRI of c-spine demonstrates multiple disc protrusions with spinal canal narrow  Patient reports increasing pain to right forearm since surgery; radiographs demonstrate a radial shaft fracture     Vitals reviewed, afebrile    Objective:   Vitals:    11/11/22 0344   BP:    Pulse: (!) 103   Resp: 20   Temp:    SpO2: 97%     Gen: NAD, cooperative      Cardiovascular: tachycardic rate     Respiratory: No acute respiratory distress    LLE: Dressings/Ace on, d/I. Mild saturation of proximal thigh optifoam under ACE. WoundVac on maintaining adequate suction. Minimal output (50cc over last shift). Moderately TTP about the lower leg. Compartments soft. 2+ DP pulse. TA/EHL/FHL/GS motor intact. Deep and Superficial Peroneal/Saphenous/Sural/Plantar SILT. LUE: Able to actively move arm and shoulder above head without discomfort. Has significant ecchymoses and abrasions to the entire extremity. Compartments firm yet easily compressible of arm, forearm, and hand. 2+ rad pulse. Median/Radial/Ulnar/AIN/PIN motor intact yet diminished. Median/Radial/Ulnar nerve SILT yet diminished. RUE: Able to actively move arm and shoulder above head without discomfort. Has significant ecchymoses and abrasions to the entire extremity with some scattered skin tearing and maceration with bullae formation. Ecchymoses directly over fracture site without skin tenting. TTP over the fracture site and diffusely due to swelling. Compartments firm yet easily compressible of arm, forearm, and hand. 2+ rad pulse. Median/Radial/Ulnar/AIN/PIN motor intact yet diminished. Median/Radial/Ulnar nerve SILT yet diminished. Recent Labs     11/09/22  0814 11/09/22  1530 11/10/22  0623 11/10/22  1739   WBC 30.0*   < > 15.7*  --    HGB 14.9   < > 9.9*  --    HCT 44.4   < > 29.0*  --       < > See Reflexed IPF Result  --    INR 1.0  --   --   --      --  132* 133*   K 3.4*  --  4.7 5.2   BUN 19  --  26* 17   CREATININE 1.18  --  1.47* 0.84   GLUCOSE 196*  --  130* 112*    < > = values in this interval not displayed. Abx: Clindamycin  DVT ppx: None  See rec for complete list    Impression 38 y.o. male who is being seen for:    - Left femur fracture s/p IMN, POD#2  - Left open fibula fracture s/p I&D, POD#2  - Left tibial plateau fracture  - Left clavicle fracture  - Right radial shaft fracture  - Left radial neck fracture    Plan    - OR 11/11/21 with orthopaedic surgery   - Will discuss with trauma team about IV placement regarding surgery for RUE/LUE   - NWB LUE/RUE/LLE  - Dressings on left lower extremity. Do not remove optifoam dressings. Okay to reinforce/maintain by nursing if necessary. Please notify Ortho if saturated. - Placed splint today on RUE. Please maintain and keep clean & dry.  - DVT ppx:  EPC. Please hold chemical anticoagulation in anticipation for OR today. Okay to start chemical anticoagulation POD#1. - Ice/Elevate to control pain and edema  - Diet: NPO in anticipation for OR  - Encourage Incentive Spirometry use  - PT/OT will evaluate post-op  - Please page Ortho on call with any questions    Sybil Peace,   Orthopedic Surgery Resident, PGY-1  R [facility], [facility]       PGY-3 Addendum    Patient seen and examined. Agree with subjective and objective portions from Dr. Brittani Turcios. The patient is a 38 y.o. male with the injuries as listed above. Patient planned for OR this morning for fixation of RUE/LUE/LLE.  Will need IV placement changed; will discuss with trauma team regarding possible central line placement or changing site of IV line if possible. Patient has significant swelling to limbs; but his compartments remain compressible and there is no pain on passive range of motion of joints. He has limited active range of motion of his digits/wrist, sensation diffusely intact. Maintain NPO now in anticipation of surgery. Hold chemical AC. Please page ortho with any questions or concerns.     Electronically signed by Mary Swain DO 6:19 AM 11/11/2022

## 2022-11-11 NOTE — PLAN OF CARE
Problem: Discharge Planning  Goal: Discharge to home or other facility with appropriate resources  11/10/2022 2013 by Prashanth Collado RN  Outcome: Progressing  11/10/2022 1059 by Silvia Roberson RN  Outcome: Progressing     Problem: Pain  Goal: Verbalizes/displays adequate comfort level or baseline comfort level  11/10/2022 2013 by Prashanth Collado RN  Outcome: Progressing  11/10/2022 1059 by Silvia Roberson RN  Outcome: Progressing     Problem: Safety - Adult  Goal: Free from fall injury  Outcome: Progressing     Problem: Skin/Tissue Integrity  Goal: Absence of new skin breakdown  Description: 1. Monitor for areas of redness and/or skin breakdown  2. Assess vascular access sites hourly  3. Every 4-6 hours minimum:  Change oxygen saturation probe site  4. Every 4-6 hours:  If on nasal continuous positive airway pressure, respiratory therapy assess nares and determine need for appliance change or resting period.   Outcome: Progressing
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
## 2022-11-11 NOTE — ANESTHESIA PROCEDURE NOTES
Central Venous Line:    A central venous line was placed using ultrasound guidance, in the OR for the following indication(s): central venous access. 11/11/2022 1:50 PM11/11/2022 1:55 PM    Sterility preparation included the following: hand hygiene performed prior to procedure, maximum sterile barriers used and sterile technique used to drape from head to toe. The patient was placed in Trendelenburg position. The right internal jugular vein was prepped. The site was prepped with Chloraprep. A 9 Fr (size), introducer SLIC was placed. During the procedure, the following specific steps were taken: target vein identified, needle advanced into vein and blood aspirated and guidewire advanced into vein. Intravenous verification was obtained by ultrasound, venous blood return and manometry. Post insertion care included: all ports aspirated, all ports flushed easily, guidewire removed intact and line sutured in place. During the procedure the patient experienced: patient tolerated procedure well with no complications.       Outcomes: uncomplicated and patient tolerated procedure wellno  Anesthesia type: general  Staffing  Performed: Anesthesiologist   Anesthesiologist: Amanuel Weeks MD  Preanesthetic Checklist  Completed: patient identified, IV checked, site marked, risks and benefits discussed, surgical/procedural consents, equipment checked, pre-op evaluation, timeout performed, anesthesia consent given, oxygen available, monitors applied/VS acknowledged, fire risk safety assessment completed and verbalized and blood product R/B/A discussed and consented
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
## 2022-11-11 NOTE — PROGRESS NOTES
Ortho resident asked RN to speak with trauma about getting a central line placed prior to surgery this AM, message sent via secured messaging at [phone], awaiting reply. Trauma replied that they are unable to do lines today due to staffing, and directed ortho to contact anesthesia. Message sent to ortho via secured message. Ortho inquired if RN could place an IV in the RLE. RN explained that after inspection, there would not be a stable enough place in the RLE to place an IV, and that the right lower leg is the only place we can obtain BP. Awaiting reply.

## 2022-11-12 ENCOUNTER — APPOINTMENT (OUTPATIENT)
Dept: GENERAL RADIOLOGY | Age: 42
DRG: 463 | End: 2022-11-12
Payer: COMMERCIAL

## 2022-11-12 LAB
ABSOLUTE EOS #: 0 K/UL (ref 0–0.44)
ABSOLUTE IMMATURE GRANULOCYTE: 0.3 K/UL (ref 0–0.3)
ABSOLUTE LYMPH #: 1.48 K/UL (ref 1.1–3.7)
ABSOLUTE MONO #: 1.78 K/UL (ref 0.1–1.2)
ALLEN TEST: POSITIVE
ALLEN TEST: POSITIVE
ANION GAP SERPL CALCULATED.3IONS-SCNC: 5 MMOL/L (ref 9–17)
ANION GAP: 8 MMOL/L (ref 7–16)
ANION GAP: 8 MMOL/L (ref 7–16)
BASOPHILS # BLD: 0 % (ref 0–2)
BASOPHILS ABSOLUTE: 0 K/UL (ref 0–0.2)
BUN BLDV-MCNC: 16 MG/DL (ref 6–20)
CALCIUM SERPL-MCNC: 7.7 MG/DL (ref 8.6–10.4)
CALCIUM URINE: <0.6 MG/DL
CHLORIDE BLD-SCNC: 104 MMOL/L (ref 98–107)
CO2: 28 MMOL/L (ref 20–31)
CREAT SERPL-MCNC: 0.91 MG/DL (ref 0.7–1.2)
EGFR, POC: >60 ML/MIN/1.73M2
EGFR, POC: >60 ML/MIN/1.73M2
EOSINOPHILS RELATIVE PERCENT: 0 % (ref 1–4)
FIO2: 60
FIO2: 60
GFR SERPL CREATININE-BSD FRML MDRD: >60 ML/MIN/1.73M2
GLUCOSE BLD-MCNC: 106 MG/DL (ref 70–99)
GLUCOSE BLD-MCNC: 107 MG/DL (ref 74–100)
GLUCOSE BLD-MCNC: 108 MG/DL (ref 74–100)
GLUCOSE BLD-MCNC: 116 MG/DL (ref 75–110)
GLUCOSE BLD-MCNC: 131 MG/DL (ref 75–110)
GLUCOSE BLD-MCNC: 132 MG/DL (ref 75–110)
GLUCOSE BLD-MCNC: 160 MG/DL (ref 75–110)
HCO3 VENOUS: 26.7 MMOL/L (ref 22–29)
HCT VFR BLD CALC: 20.1 % (ref 40.7–50.3)
HCT VFR BLD CALC: 22.7 % (ref 40.7–50.3)
HCT VFR BLD CALC: 23.4 % (ref 40.7–50.3)
HEMOGLOBIN: 6.7 G/DL (ref 13–17)
HEMOGLOBIN: 7.7 G/DL (ref 13–17)
HEMOGLOBIN: 7.7 G/DL (ref 13–17)
IMMATURE GRANULOCYTES: 2 %
LYMPHOCYTES # BLD: 10 % (ref 24–43)
MCH RBC QN AUTO: 31.7 PG (ref 25.2–33.5)
MCHC RBC AUTO-ENTMCNC: 33.9 G/DL (ref 28.4–34.8)
MCV RBC AUTO: 93.4 FL (ref 82.6–102.9)
MONOCYTES # BLD: 12 % (ref 3–12)
MORPHOLOGY: ABNORMAL
MYOGLOBIN: ABNORMAL NG/ML (ref 28–72)
NRBC AUTOMATED: 0.5 PER 100 WBC
O2 SAT, VEN: 59 % (ref 60–85)
PCO2, VEN: 44 MM HG (ref 41–51)
PDW BLD-RTO: 13.5 % (ref 11.8–14.4)
PH VENOUS: 7.39 (ref 7.32–7.43)
PLATELET # BLD: ABNORMAL K/UL (ref 138–453)
PLATELET, FLUORESCENCE: 122 K/UL (ref 138–453)
PLATELET, IMMATURE FRACTION: 7.4 % (ref 1.1–10.3)
PO2, VEN: 31.1 MM HG (ref 30–50)
POC BUN: 16 MG/DL (ref 8–26)
POC BUN: 17 MG/DL (ref 8–26)
POC CHLORIDE: 102 MMOL/L (ref 98–107)
POC CHLORIDE: 104 MMOL/L (ref 98–107)
POC CREATININE: 1.2 MG/DL (ref 0.51–1.19)
POC CREATININE: 1.21 MG/DL (ref 0.51–1.19)
POC HCO3: 28.1 MMOL/L (ref 21–28)
POC HEMATOCRIT: 20 % (ref 41–53)
POC HEMATOCRIT: 21 % (ref 41–53)
POC HEMOGLOBIN: 6.8 G/DL (ref 13.5–17.5)
POC HEMOGLOBIN: 7.1 G/DL (ref 13.5–17.5)
POC IONIZED CALCIUM: 1.02 MMOL/L (ref 1.15–1.33)
POC IONIZED CALCIUM: 1.09 MMOL/L (ref 1.15–1.33)
POC LACTIC ACID: 0.84 MMOL/L (ref 0.56–1.39)
POC LACTIC ACID: 1.05 MMOL/L (ref 0.56–1.39)
POC O2 SATURATION: 99 % (ref 94–98)
POC PCO2: 39.4 MM HG (ref 35–48)
POC PH: 7.46 (ref 7.35–7.45)
POC PO2: 145.2 MM HG (ref 83–108)
POC POTASSIUM: 5.1 MMOL/L (ref 3.5–4.5)
POC POTASSIUM: 5.1 MMOL/L (ref 3.5–4.5)
POC SODIUM: 136 MMOL/L (ref 138–146)
POC SODIUM: 139 MMOL/L (ref 138–146)
POC TCO2: 27 MMOL/L (ref 22–30)
POC TCO2: 28 MMOL/L (ref 22–30)
POSITIVE BASE EXCESS, ART: 4 (ref 0–3)
POSITIVE BASE EXCESS, VEN: 2 (ref 0–3)
POTASSIUM SERPL-SCNC: 5.3 MMOL/L (ref 3.7–5.3)
RBC # BLD: 2.43 M/UL (ref 4.21–5.77)
SAMPLE SITE: ABNORMAL
SAMPLE SITE: ABNORMAL
SEG NEUTROPHILS: 76 % (ref 36–65)
SEGMENTED NEUTROPHILS ABSOLUTE COUNT: 11.24 K/UL (ref 1.5–8.1)
SODIUM BLD-SCNC: 137 MMOL/L (ref 135–144)
SODIUM,UR: <20 MMOL/L
TOTAL CK: ABNORMAL U/L (ref 39–308)
WBC # BLD: 14.8 K/UL (ref 3.5–11.3)

## 2022-11-12 PROCEDURE — 2500000003 HC RX 250 WO HCPCS: Performed by: STUDENT IN AN ORGANIZED HEALTH CARE EDUCATION/TRAINING PROGRAM

## 2022-11-12 PROCEDURE — 85025 COMPLETE CBC W/AUTO DIFF WBC: CPT

## 2022-11-12 PROCEDURE — 2500000003 HC RX 250 WO HCPCS

## 2022-11-12 PROCEDURE — 82803 BLOOD GASES ANY COMBINATION: CPT

## 2022-11-12 PROCEDURE — 83874 ASSAY OF MYOGLOBIN: CPT

## 2022-11-12 PROCEDURE — 36415 COLL VENOUS BLD VENIPUNCTURE: CPT

## 2022-11-12 PROCEDURE — P9016 RBC LEUKOCYTES REDUCED: HCPCS

## 2022-11-12 PROCEDURE — 82947 ASSAY GLUCOSE BLOOD QUANT: CPT

## 2022-11-12 PROCEDURE — 82330 ASSAY OF CALCIUM: CPT

## 2022-11-12 PROCEDURE — 83605 ASSAY OF LACTIC ACID: CPT

## 2022-11-12 PROCEDURE — 73630 X-RAY EXAM OF FOOT: CPT

## 2022-11-12 PROCEDURE — 82550 ASSAY OF CK (CPK): CPT

## 2022-11-12 PROCEDURE — 86900 BLOOD TYPING SEROLOGIC ABO: CPT

## 2022-11-12 PROCEDURE — 6370000000 HC RX 637 (ALT 250 FOR IP): Performed by: STUDENT IN AN ORGANIZED HEALTH CARE EDUCATION/TRAINING PROGRAM

## 2022-11-12 PROCEDURE — 85014 HEMATOCRIT: CPT

## 2022-11-12 PROCEDURE — 2580000003 HC RX 258: Performed by: STUDENT IN AN ORGANIZED HEALTH CARE EDUCATION/TRAINING PROGRAM

## 2022-11-12 PROCEDURE — 6360000002 HC RX W HCPCS: Performed by: STUDENT IN AN ORGANIZED HEALTH CARE EDUCATION/TRAINING PROGRAM

## 2022-11-12 PROCEDURE — 6360000002 HC RX W HCPCS

## 2022-11-12 PROCEDURE — 2700000000 HC OXYGEN THERAPY PER DAY

## 2022-11-12 PROCEDURE — 73610 X-RAY EXAM OF ANKLE: CPT

## 2022-11-12 PROCEDURE — 73590 X-RAY EXAM OF LOWER LEG: CPT

## 2022-11-12 PROCEDURE — 94761 N-INVAS EAR/PLS OXIMETRY MLT: CPT

## 2022-11-12 PROCEDURE — 85055 RETICULATED PLATELET ASSAY: CPT

## 2022-11-12 PROCEDURE — 71045 X-RAY EXAM CHEST 1 VIEW: CPT

## 2022-11-12 PROCEDURE — 82565 ASSAY OF CREATININE: CPT

## 2022-11-12 PROCEDURE — 80048 BASIC METABOLIC PNL TOTAL CA: CPT

## 2022-11-12 PROCEDURE — 80051 ELECTROLYTE PANEL: CPT

## 2022-11-12 PROCEDURE — 2580000003 HC RX 258

## 2022-11-12 PROCEDURE — 6370000000 HC RX 637 (ALT 250 FOR IP)

## 2022-11-12 PROCEDURE — 82340 ASSAY OF CALCIUM IN URINE: CPT

## 2022-11-12 PROCEDURE — 85018 HEMOGLOBIN: CPT

## 2022-11-12 PROCEDURE — 36430 TRANSFUSION BLD/BLD COMPNT: CPT

## 2022-11-12 PROCEDURE — 84300 ASSAY OF URINE SODIUM: CPT

## 2022-11-12 PROCEDURE — 2000000000 HC ICU R&B

## 2022-11-12 PROCEDURE — 84520 ASSAY OF UREA NITROGEN: CPT

## 2022-11-12 RX ORDER — DEXMEDETOMIDINE HYDROCHLORIDE 4 UG/ML
.1-1.5 INJECTION, SOLUTION INTRAVENOUS CONTINUOUS
Status: DISCONTINUED | OUTPATIENT
Start: 2022-11-12 | End: 2022-11-13

## 2022-11-12 RX ORDER — SODIUM CHLORIDE 9 MG/ML
INJECTION, SOLUTION INTRAVENOUS PRN
Status: DISCONTINUED | OUTPATIENT
Start: 2022-11-12 | End: 2022-11-26 | Stop reason: HOSPADM

## 2022-11-12 RX ORDER — DEXTROSE MONOHYDRATE 100 MG/ML
INJECTION, SOLUTION INTRAVENOUS
Status: DISPENSED
Start: 2022-11-12 | End: 2022-11-12

## 2022-11-12 RX ORDER — OXYCODONE HYDROCHLORIDE 5 MG/1
5 TABLET ORAL EVERY 4 HOURS
Status: DISCONTINUED | OUTPATIENT
Start: 2022-11-12 | End: 2022-11-17

## 2022-11-12 RX ADMIN — METHOCARBAMOL TABLETS 750 MG: 750 TABLET, COATED ORAL at 09:30

## 2022-11-12 RX ADMIN — ACETAMINOPHEN 1000 MG: 500 TABLET ORAL at 22:28

## 2022-11-12 RX ADMIN — IBUPROFEN 400 MG: 400 TABLET, FILM COATED ORAL at 17:59

## 2022-11-12 RX ADMIN — SODIUM CHLORIDE: 9 INJECTION, SOLUTION INTRAVENOUS at 22:39

## 2022-11-12 RX ADMIN — HEPARIN SODIUM 5000 UNITS: 5000 INJECTION INTRAVENOUS; SUBCUTANEOUS at 17:01

## 2022-11-12 RX ADMIN — OXYCODONE 5 MG: 5 TABLET ORAL at 12:15

## 2022-11-12 RX ADMIN — CLINDAMYCIN PHOSPHATE 600 MG: 600 INJECTION, SOLUTION INTRAVENOUS at 01:58

## 2022-11-12 RX ADMIN — POLYETHYLENE GLYCOL 3350 17 G: 17 POWDER, FOR SOLUTION ORAL at 09:33

## 2022-11-12 RX ADMIN — OXYCODONE 5 MG: 5 TABLET ORAL at 22:28

## 2022-11-12 RX ADMIN — METHOCARBAMOL TABLETS 750 MG: 750 TABLET, COATED ORAL at 01:56

## 2022-11-12 RX ADMIN — GABAPENTIN 300 MG: 300 CAPSULE ORAL at 22:28

## 2022-11-12 RX ADMIN — INSULIN HUMAN 10 UNITS: 100 INJECTION, SOLUTION PARENTERAL at 00:48

## 2022-11-12 RX ADMIN — GABAPENTIN 300 MG: 300 CAPSULE ORAL at 09:30

## 2022-11-12 RX ADMIN — BACITRACIN: 500 OINTMENT TOPICAL at 17:01

## 2022-11-12 RX ADMIN — IBUPROFEN 400 MG: 400 TABLET, FILM COATED ORAL at 22:28

## 2022-11-12 RX ADMIN — DOCUSATE SODIUM 50 MG AND SENNOSIDES 8.6 MG 1 TABLET: 8.6; 5 TABLET, FILM COATED ORAL at 09:30

## 2022-11-12 RX ADMIN — SODIUM CHLORIDE, PRESERVATIVE FREE 20 MG: 5 INJECTION INTRAVENOUS at 22:28

## 2022-11-12 RX ADMIN — ACETAMINOPHEN 1000 MG: 500 TABLET ORAL at 12:15

## 2022-11-12 RX ADMIN — CLINDAMYCIN PHOSPHATE 600 MG: 600 INJECTION, SOLUTION INTRAVENOUS at 18:00

## 2022-11-12 RX ADMIN — DEXMEDETOMIDINE HYDROCHLORIDE 0.2 MCG/KG/HR: 4 INJECTION, SOLUTION INTRAVENOUS at 04:34

## 2022-11-12 RX ADMIN — BACITRACIN: 500 OINTMENT TOPICAL at 09:30

## 2022-11-12 RX ADMIN — PROPOFOL 35 MCG/KG/MIN: 10 INJECTION, EMULSION INTRAVENOUS at 01:59

## 2022-11-12 RX ADMIN — IBUPROFEN 400 MG: 400 TABLET, FILM COATED ORAL at 10:45

## 2022-11-12 RX ADMIN — IBUPROFEN 400 MG: 400 TABLET, FILM COATED ORAL at 01:56

## 2022-11-12 RX ADMIN — SODIUM CHLORIDE: 9 INJECTION, SOLUTION INTRAVENOUS at 04:48

## 2022-11-12 RX ADMIN — DEXMEDETOMIDINE HYDROCHLORIDE 0.4 MCG/KG/HR: 4 INJECTION, SOLUTION INTRAVENOUS at 16:44

## 2022-11-12 RX ADMIN — DEXTROSE MONOHYDRATE 250 ML: 100 INJECTION, SOLUTION INTRAVENOUS at 00:52

## 2022-11-12 RX ADMIN — ACETAMINOPHEN 1000 MG: 500 TABLET ORAL at 05:24

## 2022-11-12 RX ADMIN — CHLORHEXIDINE GLUCONATE 15 ML: 1.2 RINSE ORAL at 09:32

## 2022-11-12 RX ADMIN — BACITRACIN: 500 OINTMENT TOPICAL at 22:28

## 2022-11-12 RX ADMIN — HEPARIN SODIUM 5000 UNITS: 5000 INJECTION INTRAVENOUS; SUBCUTANEOUS at 22:28

## 2022-11-12 RX ADMIN — CLINDAMYCIN PHOSPHATE 600 MG: 600 INJECTION, SOLUTION INTRAVENOUS at 09:26

## 2022-11-12 RX ADMIN — METHOCARBAMOL TABLETS 750 MG: 750 TABLET, COATED ORAL at 18:00

## 2022-11-12 RX ADMIN — SODIUM CHLORIDE, PRESERVATIVE FREE 20 MG: 5 INJECTION INTRAVENOUS at 09:31

## 2022-11-12 RX ADMIN — OXYCODONE 5 MG: 5 TABLET ORAL at 17:59

## 2022-11-12 RX ADMIN — IBUPROFEN 400 MG: 400 TABLET, FILM COATED ORAL at 05:24

## 2022-11-12 ASSESSMENT — PULMONARY FUNCTION TESTS
PIF_VALUE: 31
PIF_VALUE: 22
PIF_VALUE: 17
PIF_VALUE: 19
PIF_VALUE: 31
PIF_VALUE: 31

## 2022-11-12 ASSESSMENT — PAIN SCALES - GENERAL
PAINLEVEL_OUTOF10: 5
PAINLEVEL_OUTOF10: 4
PAINLEVEL_OUTOF10: 3
PAINLEVEL_OUTOF10: 4

## 2022-11-12 NOTE — PROGRESS NOTES
[employer] of Workers Compensation FROI form completed and faxed to [employer] at [phone]. A copy of the form has been placed in the patient's chart and  aware.

## 2022-11-12 NOTE — PROGRESS NOTES
Orthopedic Progress Note    Patient:  Claudette Clark  YOB: 1980     38 y.o. male    Subjective:  Patient seen and examined this morning. Intubated and sedated, though able to participate in exam  Plan to be extubated this morning   Pain is well controlled on current regimen. Continues to have significant swelling in bilateral arms but is more comfortable than yesterday. Vitals reviewed, afebrile    Objective:   Vitals:    11/12/22 0515   BP:    Pulse: (!) 105   Resp: 18   Temp:    SpO2: 99%     Gen: NAD, cooperative      Cardiovascular: tachycardic rate     Respiratory: No acute respiratory distress    LLE: Dressings/Ace on, d/I. Mild saturation of proximal thigh optifoam under ACE. WoundVac on, maintaining adequate suction, 50 cc output since placement. Moderately TTP about the lower leg. Compartments soft. 2+ DP pulse. TA/EHL/FHL/GS motor intact. Deep and Superficial Peroneal/Saphenous/Sural/Plantar SILT. LUE: Optifoam on, d/I w/ minimal spotting. Currently in restraints. Has significant ecchymoses and abrasions to the entire extremity. Compartments swollen yet easily compressible of arm, forearm, and hand. 2+ rad pulse. Median/Radial/Ulnar/AIN/PIN motor intact yet diminished. Median/Radial/Ulnar nerve SILT yet diminished. RUE: Ace on, c/d/I. Currently in restraints. Has significant ecchymoses and abrasions to the entire extremity with some scattered skin tearing and maceration with bullae formation. TTP over the fracture site and diffusely due to swelling. Compartments swollen yet easily compressible of arm, forearm, and hand. Fingers warm and well-perfused with BCR. Median/Radial/Ulnar/AIN/PIN motor intact yet diminished. Median/Radial/Ulnar nerve SILT yet diminished.      Recent Labs     11/09/22  0814 11/09/22  1530 11/12/22  0507   WBC 30.0*   < > 14.8*   HGB 14.9   < > 7.7*   HCT 44.4   < > 22.7*      < > See Reflexed IPF Result   INR 1.0  --   --       < > 137   K 3.4*   < > 5.3   BUN 19   < > 16   CREATININE 1.18   < > 0.91   GLUCOSE 196*   < > 106*    < > = values in this interval not displayed. Abx: Clindamycin  DVT ppx: Heparin  See rec for complete list    Impression 38 y.o. male who is being seen for:    - Left femur fracture s/p IMN, POD#3  - Left open fibula fracture s/p I&D, POD#3  - Left tibial plateau fracture  - Left clavicle fracture s/p ORIF, POD#1  - Right radial shaft fracture s/p ORIF, POD#1  - Left radial neck fracture    Plan    - Plan to return to OR 11/15/22 with ortho for L tibial plateau   - NWB LUE/RUE/LLE  - Dressings on left lower extremity. Do not remove optifoam dressings. Okay to reinforce/maintain by nursing if necessary. Please notify Ortho if saturated. - WoundVac on, maintaining adequate suction, 50 cc output since placement, please record output each shift  - Maintain splint on RUE.  Please keep clean & dry.  - DVT ppx:  Heparin per primary  - Ice/Elevate to control pain and edema  - Diet OK from orthopedic perspective  - Encourage Incentive Spirometry use  - PT/OT will evaluate today  - Please page Ortho on-call with any questions      Electronically signed by Latrice Reina DO 6:36 AM 11/12/2022

## 2022-11-12 NOTE — PLAN OF CARE
Problem: Discharge Planning  Goal: Discharge to home or other facility with appropriate resources  Outcome: Progressing  Flowsheets (Taken 11/11/2022 2030)  Discharge to home or other facility with appropriate resources: Identify barriers to discharge with patient and caregiver     Problem: Pain  Goal: Verbalizes/displays adequate comfort level or baseline comfort level  Outcome: Progressing  Flowsheets (Taken 11/11/2022 2030)  Verbalizes/displays adequate comfort level or baseline comfort level:   Assess pain using appropriate pain scale   Administer analgesics based on type and severity of pain and evaluate response   Implement non-pharmacological measures as appropriate and evaluate response   Notify Licensed Independent Practitioner if interventions unsuccessful or patient reports new pain     Problem: Safety - Adult  Goal: Free from fall injury  Outcome: Progressing  Flowsheets (Taken 11/11/2022 2030)  Free From Fall Injury:   Instruct family/caregiver on patient safety   Based on caregiver fall risk screen, instruct family/caregiver to ask for assistance with transferring infant if caregiver noted to have fall risk factors     Problem: Skin/Tissue Integrity  Goal: Absence of new skin breakdown  Description: 1. Monitor for areas of redness and/or skin breakdown  2. Assess vascular access sites hourly  3. Every 4-6 hours minimum:  Change oxygen saturation probe site  4. Every 4-6 hours:  If on nasal continuous positive airway pressure, respiratory therapy assess nares and determine need for appliance change or resting period.   Outcome: Progressing     Problem: ABCDS Injury Assessment  Goal: Absence of physical injury  Outcome: Progressing  Flowsheets (Taken 11/11/2022 2030)  Absence of Physical Injury: Implement safety measures based on patient assessment     Problem: Safety - Medical Restraint  Goal: Remains free of injury from restraints (Restraint for Interference with Medical Device)  Description: INTERVENTIONS:  1. Determine that other, less restrictive measures have been tried or would not be effective before applying the restraint  2. Evaluate the patient's condition at the time of restraint application  3. Inform patient/family regarding the reason for restraint  4.  Q2H: Monitor safety, psychosocial status, comfort, nutrition and hydration  11/12/2022 0317 by David Babcock RN  Outcome: Progressing  11/12/2022 0317 by David Babcock RN  Flowsheets  Taken 11/12/2022 2477  Remains free of injury from restraints (restraint for interference with medical device):   Determine that other, less restrictive measures have been tried or would not be effective before applying the restraint   Evaluate the patient's condition at the time of restraint application   Every 2 hours: Monitor safety, psychosocial status, comfort, nutrition and hydration   Inform patient/family regarding the reason for restraint  Taken 11/11/2022 2151  Remains free of injury from restraints (restraint for interference with medical device):   Determine that other, less restrictive measures have been tried or would not be effective before applying the restraint   Evaluate the patient's condition at the time of restraint application   Every 2 hours: Monitor safety, psychosocial status, comfort, nutrition and hydration   Inform patient/family regarding the reason for restraint

## 2022-11-12 NOTE — PROGRESS NOTES
11/12/22 1352   Vent Information   Ventilator Discontinue (S)  Yes       Order obtained for extubation. SpO2 of 100% on 40% FiO2. Patient extubated and placed on 3 liters/min via nasal cannula. Post extubation SpO2 is 96% with HR  88 bpm and RR 18 breaths/min. Patient had strong cough that was non-productive. Extubation Well tolerated by patient. .   Breath Sounds: clear    OLIVA ROBISON RCP   1:57 PM

## 2022-11-12 NOTE — BRIEF OP NOTE
Brief Postoperative Note      Patient: Moose Farmer  YOB: 1980  MRN: [id number]    Date of Procedure: 11/11/2022    Pre-Op Diagnosis:   -Left open fibula fracture  -Left partial achilles tendon tear  -Left clavicle fracture  -Right radial shaft fracture  -Left radial head fracture    Post-Op Diagnosis: Same       Procedure(s):  -Left fibula ORIF (49092)  -Left achilles tendon repair (75408)  -Left leg irrigation and excisional debridement skin down to bone; 20x8cm and 11x5cm wounds (37270, 33831S58)  -Left leg tissue expander application (40042)  -Left ankle complex wound closure 11x5cm (54791. 18015)  -Left leg wound vac application (43255)   -Left clavicle ORIF (94873)  -Right radial shaft ORIF (51106)   -Left radial head closed treatment (36472)        Surgeon(s):  Lyndsey Bliss DO    Assistant:  Resident: Tiffany Villalobos DO; Zeke Moran DO    Anesthesia: General    Estimated Blood Loss (mL): 220    Fluids: 1500cc crystalloid, 1uPRBC    TT:   R arm: 442 min    Complications: None    Specimens:   * No specimens in log *    Implants:  Implant Name Type Inv.  Item Serial No.  Lot No. LRB No. Used Action   PLATE BNE [id number] 11 H ST ANT LAT CERV S STL EVERARDO COMPR MARIA M - [id number]  PLATE BNE [id number] 11 H ST ANT LAT CERV S STL EVERARDO COMPR MARIA M  DEPU[employer]-WD   1 Implanted   SCREW BNE L22MM DIA2.7MM MTPHSEAL S STL ST FULL THRD T8 - [id number]  SCREW BNE L22MM DIA2.7MM MTPHSEAL S STL ST FULL THRD T8  [employer]WD  Left 1 Implanted   SCREW BNE L22MM DIA2.7MM ANK S STL ST MARIA M ANG EVERARDO FULL THRD - [id number]  SCREW BNE L22MM DIA2.7MM ANK S STL ST MARIA M ANG EVERARDO FULL THRD  DE[employer]WD  Left 1 Implanted   SCREW BNE L24MM DIA2.7MM ANK S STL ST MARIA M ANG EVERARDO FULL THRD - [id number]  SCREW BNE L24MM DIA2.7MM ANK S STL ST MARIA M ANG EVERARDO FULL THRD  DE[employer]WD  Left 1 Implanted   SCREW BNE L28MM DIA2.7MM S STL ST MARIA M ANG EVERARDO FULL THRD T8 - [id number]  SCREW BNE L28MM DIA2.7MM S STL ST MARIA M ANG EVERARDO FULL THRD T8  DE[employer]WD  Left 1 Implanted   SCREW BNE L16MM DIA3.5MM MICHAEL S STL ST EVERARDO FULL THRD T15 - [id number]  SCREW BNE L16MM DIA3.5MM MICHAEL S STL ST EVERARDO FULL THRD T15  [employer]WD  Left 1 Implanted   SCREW BNE L16MM DIA3.5MM MICHAEL S STL ST EVERARDO FULL THRD T15 - [id number]  SCREW BNE L16MM DIA3.5MM MICHAEL S STL ST EVERARDO FULL THRD T15  [employer]  Left 1 Implanted   SCREW BNE L20MM DIA3.5MM MICHAEL S STL ST FULL THRD EVERARDO T15 - [id number]  SCREW BNE L20MM DIA3.5MM MICHAEL S STL ST FULL THRD EVERARDO T15  DEPU[employer]  Left 2 Implanted   SYNTHES 3.5 CORTEX SCREW REF 02.200.026        Left 1 Implanted   NAIL IM ELASTIC 2X440 MM TI GRN NS - [id number]  NAIL IM ELASTIC 2X440 MM TI GRN NS  [employer]WD  Left 1 Implanted   SCREW BNE L8MM DIA2MM MICHAEL S STL ST EVERARDO FULL THRD T6 - [id number]  SCREW BNE L8MM DIA2MM MICHAEL S STL ST EVERARDO FULL THRD T6  DE[employer]  Left 1 Implanted   SCREW BNE L10MM DIA2MM MICHAEL S STL ST EVERARDO FULL THRD T6 - [id number]  SCREW BNE L10MM DIA2MM MICHAEL S STL ST EVERARDO FULL THRD T6  DEPU[employer]WD  Left 3 Implanted   SCREW BNE ST 2X10 MM CRTX LCK W/ STARDRV RECESS SS NS LCP - [id number]  SCREW BNE ST 2X10 MM CRTX LCK W/ STARDRV RECESS SS NS LCP  DEPU[employer]WD  Left 3 Implanted   PLATE BNE T80DD 7 H BILAT S STL EVERARDO COMPR LO PROF FOR 2MM - [id number]  PLATE BNE C40PO 7 H BILAT S STL EVERARDO COMPR LO PROF FOR 2MM  DE[employer]  Left 1 Implanted   PLATE BNE I83CY THK3. 4MM 7 H BILAT S STL STR EVERARDO COMPR FOR - [id number]  PLATE BNE Y91MK THK3. 4MM 7 H BILAT S STL STR EVERARDO COMPR FOR  DEPU[employer] [place]-  Right 1 Implanted   SCREW BNE L18MM DIA3.5MM MICHAEL S STL ST EVERARDO FULL THRD - [id number]  SCREW BNE L18MM DIA3.5MM MICHAEL S STL ST EVERARDO FULL THRD  DEPU[employer]WD  Right 1 Implanted   SCREW BNE L16MM DIA3.5MM MICHAEL S STL ST NONCANNULATED EVERARDO - [id number]  SCREW BNE L16MM DIA3.5MM MICHAEL S STL ST NONCANNULATED EVERARDO  DEPUY SYNTHES [facility]-WD  Right 2 Implanted   SCREW THOMPSON 3.5MM SELF-TAPPING 18MM - [id number]  SCREW THOMPSON 3.5MM SELF-TAPPING 18MM  [employer]-WD  Right 3 Implanted         Drains:   Negative Pressure Wound Therapy Leg Anterior;Left;Lower (Active)       NG/OG/NJ/NE Tube Orogastric 18 fr (Active)       Urinary Catheter 11/10/22 Miller (Active)   $ Urethral catheter insertion $ Not inserted for procedure 11/10/22 1450   Catheter Indications Perioperative use for selected surgical procedures 11/11/22 0800   Site Assessment No urethral drainage 11/11/22 0800   Urine Color Yellow; Dolores 11/11/22 0800   Urine Appearance Clear 11/11/22 0800   Collection Container Standard 11/11/22 0800   Securement Method Tape;Securing device (Describe) 11/11/22 0800   Catheter Care Completed Yes 11/10/22 2010   Catheter Best Practices  Drainage tube clipped to bed;Catheter secured to thigh; Tamper seal intact; Bag below bladder;Bag not on floor; Lack of dependent loop in tubing;Drainage bag less than half full 11/11/22 0800   Status Draining 11/11/22 0800   Output (mL) 1050 mL 11/11/22 0606       [REMOVED] Negative Pressure Wound Therapy Leg Left; Lower; Lateral (Removed)   Wound Type Surgical 11/11/22 0800   Unit Type imer 11/09/22 2330   Dressing Type Black Foam 11/11/22 0800   Cycle Continuous 11/11/22 0800   Target Pressure (mmHg) 125 11/11/22 0800   Canister changed? No 11/11/22 0800   Dressing Status Clean, dry & intact 11/11/22 0800   Drainage Amount Moderate 11/11/22 0800   Drainage Description Serosanguinous 11/11/22 0800   Output (ml) 50 ml 11/10/22 0610       Findings: See op note.     Electronically signed by Viji Cavazos DO on 11/11/2022 at 7:33 PM
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
## 2022-11-12 NOTE — ANESTHESIA POSTPROCEDURE EVALUATION
Department of Anesthesiology  Postprocedure Note    Patient: Claudette Clark  MRN: [id number]  YOB: 1980  Date of evaluation: 11/11/2022      Procedure Summary     Date: 11/11/22 Room / Location: [address]    Anesthesia Start: 1308 Anesthesia Stop: 1928    Procedures:       IRRIGATION AND DEBRIDEMENT 20X 8  AND 11X5 CM LEFT LOWER EXTEMITY , WOUND SKIN - BONE , OPEN REDUCTION INTERNAL FIXATION  LEFT FIBULA , APPLICATION OF TISSUE . EXPANDER LEFT LEG , COMPLEX WOUND CLOSURE 11 X 15 CM , APPLICATION OF WOUND VAC LEFT LEG. REPAIR OF LEFT ACHILLES . CLOSED TREATMENT OF LEFT RADIAL HEAD (Left)      CLAVICLE OPEN REDUCTION INTERNAL FIXATION (Left)      RIGHT DISTAL RADIUS OPEN REDUCTION INTERNAL FIXATION, (Right) Diagnosis:       Type I or II open fracture of shaft of left fibula, unspecified fracture morphology, initial encounter      (LEFT OPEN FIBULA FRACTURE, LEFT CLOSED CLAVICLE FRACTURE)    Surgeons: Evelia Decker DO Responsible Provider: Ibeth Martinez MD    Anesthesia Type: general ASA Status: 3 - Emergent          Anesthesia Type: No value filed.     Cynthia Phase I: Cynthia Score: 9    Cynthia Phase II:        Anesthesia Post Evaluation    Patient location during evaluation: ICU  Patient participation: complete - patient cannot participate  Level of consciousness: sedated and ventilated  Pain score: 1  Airway patency: patent  Nausea & Vomiting: no nausea and no vomiting  Complications: no  Cardiovascular status: hemodynamically stable  Respiratory status: acceptable  Hydration status: euvolemic Please schedule 15 minute appointment with available provider.  Thank you
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
## 2022-11-13 LAB
ABO/RH: NORMAL
ABSOLUTE EOS #: 0.04 K/UL (ref 0–0.44)
ABSOLUTE IMMATURE GRANULOCYTE: 0.37 K/UL (ref 0–0.3)
ABSOLUTE LYMPH #: 1.76 K/UL (ref 1.1–3.7)
ABSOLUTE MONO #: 1.38 K/UL (ref 0.1–1.2)
ANION GAP SERPL CALCULATED.3IONS-SCNC: 8 MMOL/L (ref 9–17)
ANTIBODY SCREEN: NEGATIVE
ARM BAND NUMBER: NORMAL
BASOPHILS # BLD: 0 % (ref 0–2)
BASOPHILS ABSOLUTE: 0.03 K/UL (ref 0–0.2)
BLD PROD TYP BPU: NORMAL
BLOOD BANK BLOOD PRODUCT EXPIRATION DATE: NORMAL
BLOOD BANK ISBT PRODUCT BLOOD TYPE: 6200
BLOOD BANK PRODUCT CODE: NORMAL
BLOOD BANK UNIT TYPE AND RH: NORMAL
BPU ID: NORMAL
BUN BLDV-MCNC: 29 MG/DL (ref 6–20)
CALCIUM SERPL-MCNC: 7.2 MG/DL (ref 8.6–10.4)
CHLORIDE BLD-SCNC: 100 MMOL/L (ref 98–107)
CO2: 25 MMOL/L (ref 20–31)
CREAT SERPL-MCNC: 1.17 MG/DL (ref 0.7–1.2)
CROSSMATCH RESULT: NORMAL
DISPENSE STATUS BLOOD BANK: NORMAL
EOSINOPHILS RELATIVE PERCENT: 0 % (ref 1–4)
EXPIRATION DATE: NORMAL
GFR SERPL CREATININE-BSD FRML MDRD: >60 ML/MIN/1.73M2
GLUCOSE BLD-MCNC: 127 MG/DL (ref 70–99)
HCT VFR BLD CALC: 23.4 % (ref 40.7–50.3)
HEMOGLOBIN: 7.8 G/DL (ref 13–17)
IMMATURE GRANULOCYTES: 3 %
LYMPHOCYTES # BLD: 12 % (ref 24–43)
MCH RBC QN AUTO: 31 PG (ref 25.2–33.5)
MCHC RBC AUTO-ENTMCNC: 33.3 G/DL (ref 28.4–34.8)
MCV RBC AUTO: 92.9 FL (ref 82.6–102.9)
MONOCYTES # BLD: 10 % (ref 3–12)
NRBC AUTOMATED: 0.5 PER 100 WBC
PDW BLD-RTO: 13.9 % (ref 11.8–14.4)
PLATELET # BLD: ABNORMAL K/UL (ref 138–453)
PLATELET, FLUORESCENCE: 128 K/UL (ref 138–453)
PLATELET, IMMATURE FRACTION: 5.8 % (ref 1.1–10.3)
POTASSIUM SERPL-SCNC: 4 MMOL/L (ref 3.7–5.3)
RBC # BLD: 2.52 M/UL (ref 4.21–5.77)
SEG NEUTROPHILS: 75 % (ref 36–65)
SEGMENTED NEUTROPHILS ABSOLUTE COUNT: 10.84 K/UL (ref 1.5–8.1)
SODIUM BLD-SCNC: 133 MMOL/L (ref 135–144)
TOTAL CK: ABNORMAL U/L (ref 39–308)
TOTAL CK: ABNORMAL U/L (ref 39–308)
TRANSFUSION STATUS: NORMAL
UNIT DIVISION: 0
UNIT ISSUE DATE/TIME: NORMAL
WBC # BLD: 14.4 K/UL (ref 3.5–11.3)

## 2022-11-13 PROCEDURE — 85055 RETICULATED PLATELET ASSAY: CPT

## 2022-11-13 PROCEDURE — 6370000000 HC RX 637 (ALT 250 FOR IP): Performed by: STUDENT IN AN ORGANIZED HEALTH CARE EDUCATION/TRAINING PROGRAM

## 2022-11-13 PROCEDURE — 97167 OT EVAL HIGH COMPLEX 60 MIN: CPT

## 2022-11-13 PROCEDURE — 97163 PT EVAL HIGH COMPLEX 45 MIN: CPT

## 2022-11-13 PROCEDURE — 36415 COLL VENOUS BLD VENIPUNCTURE: CPT

## 2022-11-13 PROCEDURE — 86850 RBC ANTIBODY SCREEN: CPT

## 2022-11-13 PROCEDURE — 2700000000 HC OXYGEN THERAPY PER DAY

## 2022-11-13 PROCEDURE — 2000000000 HC ICU R&B

## 2022-11-13 PROCEDURE — 94761 N-INVAS EAR/PLS OXIMETRY MLT: CPT

## 2022-11-13 PROCEDURE — 97530 THERAPEUTIC ACTIVITIES: CPT

## 2022-11-13 PROCEDURE — 85025 COMPLETE CBC W/AUTO DIFF WBC: CPT

## 2022-11-13 PROCEDURE — 86901 BLOOD TYPING SEROLOGIC RH(D): CPT

## 2022-11-13 PROCEDURE — 86900 BLOOD TYPING SEROLOGIC ABO: CPT

## 2022-11-13 PROCEDURE — 6360000002 HC RX W HCPCS: Performed by: STUDENT IN AN ORGANIZED HEALTH CARE EDUCATION/TRAINING PROGRAM

## 2022-11-13 PROCEDURE — 82550 ASSAY OF CK (CPK): CPT

## 2022-11-13 PROCEDURE — 80048 BASIC METABOLIC PNL TOTAL CA: CPT

## 2022-11-13 PROCEDURE — 86920 COMPATIBILITY TEST SPIN: CPT

## 2022-11-13 PROCEDURE — 2500000003 HC RX 250 WO HCPCS: Performed by: STUDENT IN AN ORGANIZED HEALTH CARE EDUCATION/TRAINING PROGRAM

## 2022-11-13 PROCEDURE — 2580000003 HC RX 258: Performed by: STUDENT IN AN ORGANIZED HEALTH CARE EDUCATION/TRAINING PROGRAM

## 2022-11-13 RX ADMIN — GABAPENTIN 300 MG: 300 CAPSULE ORAL at 08:41

## 2022-11-13 RX ADMIN — HEPARIN SODIUM 5000 UNITS: 5000 INJECTION INTRAVENOUS; SUBCUTANEOUS at 06:28

## 2022-11-13 RX ADMIN — OXYCODONE 5 MG: 5 TABLET ORAL at 18:32

## 2022-11-13 RX ADMIN — METHOCARBAMOL TABLETS 750 MG: 750 TABLET, COATED ORAL at 02:29

## 2022-11-13 RX ADMIN — IBUPROFEN 400 MG: 400 TABLET, FILM COATED ORAL at 14:27

## 2022-11-13 RX ADMIN — POLYETHYLENE GLYCOL 3350 17 G: 17 POWDER, FOR SOLUTION ORAL at 08:42

## 2022-11-13 RX ADMIN — GABAPENTIN 300 MG: 300 CAPSULE ORAL at 20:53

## 2022-11-13 RX ADMIN — OXYCODONE 5 MG: 5 TABLET ORAL at 21:00

## 2022-11-13 RX ADMIN — ACETAMINOPHEN 1000 MG: 500 TABLET ORAL at 06:28

## 2022-11-13 RX ADMIN — IBUPROFEN 400 MG: 400 TABLET, FILM COATED ORAL at 06:28

## 2022-11-13 RX ADMIN — METHOCARBAMOL TABLETS 750 MG: 750 TABLET, COATED ORAL at 10:39

## 2022-11-13 RX ADMIN — IBUPROFEN 400 MG: 400 TABLET, FILM COATED ORAL at 02:35

## 2022-11-13 RX ADMIN — ACETAMINOPHEN 1000 MG: 500 TABLET ORAL at 20:51

## 2022-11-13 RX ADMIN — HEPARIN SODIUM 5000 UNITS: 5000 INJECTION INTRAVENOUS; SUBCUTANEOUS at 21:00

## 2022-11-13 RX ADMIN — DOCUSATE SODIUM 50 MG AND SENNOSIDES 8.6 MG 1 TABLET: 8.6; 5 TABLET, FILM COATED ORAL at 08:41

## 2022-11-13 RX ADMIN — IBUPROFEN 400 MG: 400 TABLET, FILM COATED ORAL at 18:31

## 2022-11-13 RX ADMIN — BACITRACIN: 500 OINTMENT TOPICAL at 14:26

## 2022-11-13 RX ADMIN — CLINDAMYCIN PHOSPHATE 600 MG: 600 INJECTION, SOLUTION INTRAVENOUS at 08:43

## 2022-11-13 RX ADMIN — SODIUM CHLORIDE, PRESERVATIVE FREE 10 ML: 5 INJECTION INTRAVENOUS at 20:51

## 2022-11-13 RX ADMIN — DOCUSATE SODIUM 50 MG AND SENNOSIDES 8.6 MG 1 TABLET: 8.6; 5 TABLET, FILM COATED ORAL at 20:53

## 2022-11-13 RX ADMIN — GABAPENTIN 300 MG: 300 CAPSULE ORAL at 14:27

## 2022-11-13 RX ADMIN — BACITRACIN: 500 OINTMENT TOPICAL at 08:41

## 2022-11-13 RX ADMIN — ACETAMINOPHEN 1000 MG: 500 TABLET ORAL at 14:24

## 2022-11-13 RX ADMIN — OXYCODONE 5 MG: 5 TABLET ORAL at 14:24

## 2022-11-13 RX ADMIN — OXYCODONE 5 MG: 5 TABLET ORAL at 02:29

## 2022-11-13 RX ADMIN — OXYCODONE 5 MG: 5 TABLET ORAL at 10:39

## 2022-11-13 RX ADMIN — CLINDAMYCIN PHOSPHATE 600 MG: 600 INJECTION, SOLUTION INTRAVENOUS at 02:32

## 2022-11-13 RX ADMIN — IBUPROFEN 400 MG: 400 TABLET, FILM COATED ORAL at 10:38

## 2022-11-13 RX ADMIN — OXYCODONE 5 MG: 5 TABLET ORAL at 06:28

## 2022-11-13 RX ADMIN — HEPARIN SODIUM 5000 UNITS: 5000 INJECTION INTRAVENOUS; SUBCUTANEOUS at 14:27

## 2022-11-13 RX ADMIN — CLINDAMYCIN PHOSPHATE 600 MG: 600 INJECTION, SOLUTION INTRAVENOUS at 18:31

## 2022-11-13 RX ADMIN — BACITRACIN: 500 OINTMENT TOPICAL at 20:52

## 2022-11-13 RX ADMIN — IBUPROFEN 400 MG: 400 TABLET, FILM COATED ORAL at 21:00

## 2022-11-13 RX ADMIN — METHOCARBAMOL TABLETS 750 MG: 750 TABLET, COATED ORAL at 18:31

## 2022-11-13 ASSESSMENT — PAIN - FUNCTIONAL ASSESSMENT: PAIN_FUNCTIONAL_ASSESSMENT: PREVENTS OR INTERFERES SOME ACTIVE ACTIVITIES AND ADLS

## 2022-11-13 ASSESSMENT — PAIN SCALES - GENERAL
PAINLEVEL_OUTOF10: 8
PAINLEVEL_OUTOF10: 9
PAINLEVEL_OUTOF10: 8
PAINLEVEL_OUTOF10: 5
PAINLEVEL_OUTOF10: 6
PAINLEVEL_OUTOF10: 8
PAINLEVEL_OUTOF10: 2
PAINLEVEL_OUTOF10: 7
PAINLEVEL_OUTOF10: 8

## 2022-11-13 ASSESSMENT — PAIN DESCRIPTION - LOCATION
LOCATION: HAND;FINGER (COMMENT WHICH ONE)
LOCATION: GENERALIZED;HAND;LEG
LOCATION: HAND;FINGER (COMMENT WHICH ONE)

## 2022-11-13 ASSESSMENT — PAIN DESCRIPTION - DESCRIPTORS: DESCRIPTORS: DISCOMFORT

## 2022-11-13 ASSESSMENT — PAIN DESCRIPTION - ORIENTATION: ORIENTATION: RIGHT;LEFT

## 2022-11-13 NOTE — PLAN OF CARE
Problem: Discharge Planning  Goal: Discharge to home or other facility with appropriate resources  Outcome: Progressing     Problem: Pain  Goal: Verbalizes/displays adequate comfort level or baseline comfort level  Outcome: Progressing  Flowsheets (Taken 11/12/2022 1930)  Verbalizes/displays adequate comfort level or baseline comfort level:   Encourage patient to monitor pain and request assistance   Assess pain using appropriate pain scale   Administer analgesics based on type and severity of pain and evaluate response   Implement non-pharmacological measures as appropriate and evaluate response   Consider cultural and social influences on pain and pain management   Notify Licensed Independent Practitioner if interventions unsuccessful or patient reports new pain     Problem: Safety - Adult  Goal: Free from fall injury  Outcome: Progressing  Flowsheets (Taken 11/12/2022 2000)  Free From Fall Injury:   Based on caregiver fall risk screen, instruct family/caregiver to ask for assistance with transferring infant if caregiver noted to have fall risk factors   Instruct family/caregiver on patient safety     Problem: Skin/Tissue Integrity  Goal: Absence of new skin breakdown  Description: 1. Monitor for areas of redness and/or skin breakdown  2. Assess vascular access sites hourly  3. Every 4-6 hours minimum:  Change oxygen saturation probe site  4. Every 4-6 hours:  If on nasal continuous positive airway pressure, respiratory therapy assess nares and determine need for appliance change or resting period.   Outcome: Progressing     Problem: Respiratory - Adult  Goal: Achieves optimal ventilation and oxygenation  Outcome: Progressing     Problem: ABCDS Injury Assessment  Goal: Absence of physical injury  Outcome: Progressing  Flowsheets (Taken 11/12/2022 2000)  Absence of Physical Injury: Implement safety measures based on patient assessment     Problem: Safety - Medical Restraint  Goal: Remains free of injury from restraints (Restraint for Interference with Medical Device)  Description: INTERVENTIONS:  1. Determine that other, less restrictive measures have been tried or would not be effective before applying the restraint  2. Evaluate the patient's condition at the time of restraint application  3. Inform patient/family regarding the reason for restraint  4.  Q2H: Monitor safety, psychosocial status, comfort, nutrition and hydration  Outcome: Progressing     Problem: Nutrition Deficit:  Goal: Optimize nutritional status  Outcome: Progressing

## 2022-11-13 NOTE — PROGRESS NOTES
Orthopedic Progress Note    Patient:  Lucinda Blake  YOB: 1980     38 y.o. male    Subjective:  Patient seen and examined this morning. Resting comfortable in bed. On Peridex but is able to wake up and answer questions  Pain is well controlled on current regimen. No concerns or issues overnight per patient or nursing. Vitals reviewed, afebrile    Objective:   Vitals:    11/13/22 0900   BP:    Pulse: 90   Resp: 30   Temp:    SpO2: 98%     Gen: NAD, cooperative      Cardiovascular: Regular rate    Respiratory: No acute respiratory distress    LUE: Optifoam on, d/I w/ minimal spotting. Kerlix dressings wrapped over the upper extremity that are C/D/I. Compartments swollen yet easily compressible of arm, forearm, and hand. 2+ rad pulse. Able to flex and extend fingers and minimally extend wrist.  Unable to extend thumb. Axial/Median/Radial/Ulnar nerve SILT. RUE: Ace on, c/d/I. Has significant ecchymoses and abrasions to the entire extremity with some scattered skin tearing and maceration with bullae formation. Compartments swollen yet easily compressible of arm, forearm, and hand. Able to flex and extend fingers and extend wrist. Unable to extend thumb. Axial/Median/Radial/Ulnar nerve SILT. LLE: Dressings/Ace on, d/I. of proximal thigh optifoam under ACE. WoundVac on, maintaining adequate suction, 100 cc output since placement. Moderately TTP about the lower leg. Compartments soft. 2+ DP pulse. TA/EHL/FHL/GS motor intact. Deep and Superficial Peroneal/Saphenous/Sural/Plantar SILT.     Recent Labs     11/13/22  0121   WBC 14.4*   HGB 7.8*   HCT 23.4*   PLT See Reflexed IPF Result   *   K 4.0   BUN 29*   CREATININE 1.17   GLUCOSE 127*      Abx: Clindamycin, Ancef  DVT ppx: Heparin  See rec for complete list    Impression 38 y.o. male who is being seen for:    - Left femur fracture s/p IMN, POD#4  - Left open fibula fracture s/p I&D, POD#4  - Left tibial plateau fracture  - Left clavicle fracture s/p ORIF, POD#2  - Right radial shaft fracture s/p ORIF, POD#2  - Left radial neck fracture    Plan    - Plan to return to OR 11/15/22 with ortho for L tibial plateau   - NWB LUE/RUE/LLE  - Dressings on left lower extremity. Do not remove optifoam dressings. Okay to reinforce/maintain by nursing if necessary. Please notify Ortho if saturated. - WoundVac on, maintaining adequate suction, 100 cc output since placement, please record output each shift  - Maintain splint on RUE.  Please keep clean & dry.  - DVT ppx:  Heparin per primary  - Ice/Elevate to control pain and edema  - Diet OK from orthopedic perspective  - Encourage Incentive Spirometry use  - Encourage PT/OT   - Please page Ortho on-call with any questions      -------------------------------------  Matt Gain, DO  PGY-2, Department of [address] [address], [facility], [facility]
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
## 2022-11-13 NOTE — PLAN OF CARE
Problem: Discharge Planning  Goal: Discharge to home or other facility with appropriate resources  11/13/2022 0923 by Zulay Mcnulty RN  Outcome: Progressing  11/13/2022 0316 by Deana Calderon RN  Outcome: Progressing     Problem: Pain  Goal: Verbalizes/displays adequate comfort level or baseline comfort level  11/13/2022 0923 by Zulay Mcnulty RN  Outcome: Progressing  11/13/2022 0316 by Deana Calderon RN  Outcome: Progressing  Flowsheets (Taken 11/12/2022 1930)  Verbalizes/displays adequate comfort level or baseline comfort level:   Encourage patient to monitor pain and request assistance   Assess pain using appropriate pain scale   Administer analgesics based on type and severity of pain and evaluate response   Implement non-pharmacological measures as appropriate and evaluate response   Consider cultural and social influences on pain and pain management   Notify Licensed Independent Practitioner if interventions unsuccessful or patient reports new pain     Problem: Safety - Adult  Goal: Free from fall injury  11/13/2022 0923 by Zulay Mcnulty RN  Outcome: Progressing  Flowsheets (Taken 11/13/2022 0922)  Free From Fall Injury: Instruct family/caregiver on patient safety  11/13/2022 0316 by Deana Calderon RN  Outcome: Progressing  [address] (Taken 11/12/2022 2000)  Free From Fall Injury:   Based on caregiver fall risk screen, instruct family/caregiver to ask for assistance with transferring infant if caregiver noted to have fall risk factors   Instruct family/caregiver on patient safety     Problem: Skin/Tissue Integrity  Goal: Absence of new skin breakdown  Description: 1. Monitor for areas of redness and/or skin breakdown  2. Assess vascular access sites hourly  3. Every 4-6 hours minimum:  Change oxygen saturation probe site  4. Every 4-6 hours:  If on nasal continuous positive airway pressure, respiratory therapy assess nares and determine need for appliance change or resting period.   11/13/2022 0923 by Zulay Mcnulty RN  Outcome: Progressing  11/13/2022 0316 by Dwayne Jeong RN  Outcome: Progressing     Problem: Respiratory - Adult  Goal: Achieves optimal ventilation and oxygenation  11/13/2022 0923 by Josette Hung RN  Outcome: Progressing  11/13/2022 0316 by Dwayne Jeong RN  Outcome: Progressing     Problem: ABCDS Injury Assessment  Goal: Absence of physical injury  11/13/2022 0923 by Josette Hung RN  Outcome: Progressing  Flowsheets (Taken 11/13/2022 0158)  Absence of Physical Injury: Implement safety measures based on patient assessment  11/13/2022 0316 by Dwayne Jeong RN  Outcome: Progressing  Flowsheets (Taken 11/12/2022 2000)  Absence of Physical Injury: Implement safety measures based on patient assessment     Problem: Safety - Medical Restraint  Goal: Remains free of injury from restraints (Restraint for Interference with Medical Device)  Description: INTERVENTIONS:  1. Determine that other, less restrictive measures have been tried or would not be effective before applying the restraint  2. Evaluate the patient's condition at the time of restraint application  3. Inform patient/family regarding the reason for restraint  4.  Q2H: Monitor safety, psychosocial status, comfort, nutrition and hydration  11/13/2022 0923 by Josette Hung RN  Outcome: Progressing  11/13/2022 0316 by Dwayne Jeong RN  Outcome: Progressing     Problem: Nutrition Deficit:  Goal: Optimize nutritional status  11/13/2022 0923 by Josette Hung RN  Outcome: Progressing  11/13/2022 0316 by Dwayne Jeong RN  Outcome: Progressing

## 2022-11-13 NOTE — PROCEDURES
Attempted to place patient on BiPAP and patient stated, \" I'm not wearing a mask at all. \" Patient on nasal cannula. Will continue to monitor.

## 2022-11-14 ENCOUNTER — ANESTHESIA EVENT (OUTPATIENT)
Dept: OPERATING ROOM | Age: 42
DRG: 463 | End: 2022-11-14
Payer: COMMERCIAL

## 2022-11-14 LAB
ABO/RH: NORMAL
ABSOLUTE EOS #: 0.29 K/UL (ref 0–0.4)
ABSOLUTE IMMATURE GRANULOCYTE: 1 K/UL (ref 0–0.3)
ABSOLUTE LYMPH #: 1.57 K/UL (ref 1–4.8)
ABSOLUTE MONO #: 1.72 K/UL (ref 0.1–0.8)
ANION GAP SERPL CALCULATED.3IONS-SCNC: 9 MMOL/L (ref 9–17)
ANTIBODY SCREEN: NEGATIVE
ARM BAND NUMBER: NORMAL
BASOPHILS # BLD: 0 % (ref 0–2)
BASOPHILS ABSOLUTE: 0 K/UL (ref 0–0.2)
BLD PROD TYP BPU: NORMAL
BPU ID: NORMAL
BUN BLDV-MCNC: 16 MG/DL (ref 6–20)
CALCIUM SERPL-MCNC: 7.2 MG/DL (ref 8.6–10.4)
CHLORIDE BLD-SCNC: 102 MMOL/L (ref 98–107)
CO2: 26 MMOL/L (ref 20–31)
CREAT SERPL-MCNC: 0.8 MG/DL (ref 0.7–1.2)
CROSSMATCH RESULT: NORMAL
DISPENSE STATUS BLOOD BANK: NORMAL
EKG ATRIAL RATE: 106 BPM
EKG P AXIS: 52 DEGREES
EKG P-R INTERVAL: 130 MS
EKG Q-T INTERVAL: 326 MS
EKG QRS DURATION: 76 MS
EKG QTC CALCULATION (BAZETT): 433 MS
EKG R AXIS: 43 DEGREES
EKG T AXIS: 53 DEGREES
EKG VENTRICULAR RATE: 106 BPM
EOSINOPHILS RELATIVE PERCENT: 2 % (ref 1–4)
EXPIRATION DATE: NORMAL
GFR SERPL CREATININE-BSD FRML MDRD: >60 ML/MIN/1.73M2
GLUCOSE BLD-MCNC: 100 MG/DL (ref 70–99)
HCT VFR BLD CALC: 22.7 % (ref 40.7–50.3)
HEMOGLOBIN: 7.6 G/DL (ref 13–17)
IMMATURE GRANULOCYTES: 7 %
LYMPHOCYTES # BLD: 11 % (ref 24–44)
MCH RBC QN AUTO: 31.1 PG (ref 25.2–33.5)
MCHC RBC AUTO-ENTMCNC: 33.5 G/DL (ref 28.4–34.8)
MCV RBC AUTO: 93 FL (ref 82.6–102.9)
MONOCYTES # BLD: 12 % (ref 1–7)
MORPHOLOGY: NORMAL
NRBC AUTOMATED: 0.4 PER 100 WBC
PDW BLD-RTO: 13.8 % (ref 11.8–14.4)
PLATELET # BLD: 154 K/UL (ref 138–453)
PMV BLD AUTO: 10.8 FL (ref 8.1–13.5)
POTASSIUM SERPL-SCNC: 3.9 MMOL/L (ref 3.7–5.3)
RBC # BLD: 2.44 M/UL (ref 4.21–5.77)
SEG NEUTROPHILS: 68 % (ref 36–66)
SEGMENTED NEUTROPHILS ABSOLUTE COUNT: 9.72 K/UL (ref 1.8–7.7)
SODIUM BLD-SCNC: 137 MMOL/L (ref 135–144)
TOTAL CK: ABNORMAL U/L (ref 39–308)
TRANSFUSION STATUS: NORMAL
UNIT DIVISION: 0
WBC # BLD: 14.3 K/UL (ref 3.5–11.3)

## 2022-11-14 PROCEDURE — 2580000003 HC RX 258

## 2022-11-14 PROCEDURE — 2500000003 HC RX 250 WO HCPCS: Performed by: STUDENT IN AN ORGANIZED HEALTH CARE EDUCATION/TRAINING PROGRAM

## 2022-11-14 PROCEDURE — 6370000000 HC RX 637 (ALT 250 FOR IP): Performed by: STUDENT IN AN ORGANIZED HEALTH CARE EDUCATION/TRAINING PROGRAM

## 2022-11-14 PROCEDURE — 94761 N-INVAS EAR/PLS OXIMETRY MLT: CPT

## 2022-11-14 PROCEDURE — 97535 SELF CARE MNGMENT TRAINING: CPT

## 2022-11-14 PROCEDURE — 97530 THERAPEUTIC ACTIVITIES: CPT

## 2022-11-14 PROCEDURE — 80048 BASIC METABOLIC PNL TOTAL CA: CPT

## 2022-11-14 PROCEDURE — 6360000002 HC RX W HCPCS: Performed by: STUDENT IN AN ORGANIZED HEALTH CARE EDUCATION/TRAINING PROGRAM

## 2022-11-14 PROCEDURE — 2500000003 HC RX 250 WO HCPCS

## 2022-11-14 PROCEDURE — 93010 ELECTROCARDIOGRAM REPORT: CPT | Performed by: INTERNAL MEDICINE

## 2022-11-14 PROCEDURE — 2700000000 HC OXYGEN THERAPY PER DAY

## 2022-11-14 PROCEDURE — 2580000003 HC RX 258: Performed by: STUDENT IN AN ORGANIZED HEALTH CARE EDUCATION/TRAINING PROGRAM

## 2022-11-14 PROCEDURE — 36592 COLLECT BLOOD FROM PICC: CPT

## 2022-11-14 PROCEDURE — 2060000000 HC ICU INTERMEDIATE R&B

## 2022-11-14 PROCEDURE — 97110 THERAPEUTIC EXERCISES: CPT

## 2022-11-14 PROCEDURE — 85025 COMPLETE CBC W/AUTO DIFF WBC: CPT

## 2022-11-14 PROCEDURE — 82550 ASSAY OF CK (CPK): CPT

## 2022-11-14 RX ORDER — ENOXAPARIN SODIUM 100 MG/ML
30 INJECTION SUBCUTANEOUS 2 TIMES DAILY
Status: DISCONTINUED | OUTPATIENT
Start: 2022-11-14 | End: 2022-11-26 | Stop reason: HOSPADM

## 2022-11-14 RX ORDER — SODIUM CHLORIDE, SODIUM LACTATE, POTASSIUM CHLORIDE, CALCIUM CHLORIDE 600; 310; 30; 20 MG/100ML; MG/100ML; MG/100ML; MG/100ML
INJECTION, SOLUTION INTRAVENOUS CONTINUOUS
Status: DISCONTINUED | OUTPATIENT
Start: 2022-11-14 | End: 2022-11-17

## 2022-11-14 RX ADMIN — POLYETHYLENE GLYCOL 3350 17 G: 17 POWDER, FOR SOLUTION ORAL at 09:46

## 2022-11-14 RX ADMIN — HEPARIN SODIUM 5000 UNITS: 5000 INJECTION INTRAVENOUS; SUBCUTANEOUS at 06:26

## 2022-11-14 RX ADMIN — IBUPROFEN 400 MG: 400 TABLET, FILM COATED ORAL at 21:50

## 2022-11-14 RX ADMIN — SODIUM CHLORIDE, PRESERVATIVE FREE 10 ML: 5 INJECTION INTRAVENOUS at 20:17

## 2022-11-14 RX ADMIN — CLINDAMYCIN PHOSPHATE 600 MG: 600 INJECTION, SOLUTION INTRAVENOUS at 18:16

## 2022-11-14 RX ADMIN — IBUPROFEN 400 MG: 400 TABLET, FILM COATED ORAL at 06:25

## 2022-11-14 RX ADMIN — GABAPENTIN 300 MG: 300 CAPSULE ORAL at 09:45

## 2022-11-14 RX ADMIN — IBUPROFEN 400 MG: 400 TABLET, FILM COATED ORAL at 18:15

## 2022-11-14 RX ADMIN — BACITRACIN: 500 OINTMENT TOPICAL at 20:17

## 2022-11-14 RX ADMIN — BACITRACIN: 500 OINTMENT TOPICAL at 14:32

## 2022-11-14 RX ADMIN — DOCUSATE SODIUM 50 MG AND SENNOSIDES 8.6 MG 1 TABLET: 8.6; 5 TABLET, FILM COATED ORAL at 09:46

## 2022-11-14 RX ADMIN — IBUPROFEN 400 MG: 400 TABLET, FILM COATED ORAL at 02:00

## 2022-11-14 RX ADMIN — OXYCODONE 5 MG: 5 TABLET ORAL at 14:35

## 2022-11-14 RX ADMIN — OXYCODONE 5 MG: 5 TABLET ORAL at 02:00

## 2022-11-14 RX ADMIN — BACITRACIN: 500 OINTMENT TOPICAL at 09:46

## 2022-11-14 RX ADMIN — CLINDAMYCIN PHOSPHATE 600 MG: 600 INJECTION, SOLUTION INTRAVENOUS at 09:48

## 2022-11-14 RX ADMIN — IBUPROFEN 400 MG: 400 TABLET, FILM COATED ORAL at 09:45

## 2022-11-14 RX ADMIN — CLINDAMYCIN PHOSPHATE 600 MG: 600 INJECTION, SOLUTION INTRAVENOUS at 02:01

## 2022-11-14 RX ADMIN — OXYCODONE 5 MG: 5 TABLET ORAL at 06:26

## 2022-11-14 RX ADMIN — METHOCARBAMOL TABLETS 750 MG: 750 TABLET, COATED ORAL at 18:15

## 2022-11-14 RX ADMIN — IBUPROFEN 400 MG: 400 TABLET, FILM COATED ORAL at 14:34

## 2022-11-14 RX ADMIN — METHOCARBAMOL TABLETS 750 MG: 750 TABLET, COATED ORAL at 09:45

## 2022-11-14 RX ADMIN — ACETAMINOPHEN 1000 MG: 500 TABLET ORAL at 14:32

## 2022-11-14 RX ADMIN — Medication 10 MG: at 04:12

## 2022-11-14 RX ADMIN — OXYCODONE 5 MG: 5 TABLET ORAL at 21:51

## 2022-11-14 RX ADMIN — SODIUM CHLORIDE, POTASSIUM CHLORIDE, SODIUM LACTATE AND CALCIUM CHLORIDE: 600; 310; 30; 20 INJECTION, SOLUTION INTRAVENOUS at 14:29

## 2022-11-14 RX ADMIN — OXYCODONE 5 MG: 5 TABLET ORAL at 18:15

## 2022-11-14 RX ADMIN — SODIUM CHLORIDE, POTASSIUM CHLORIDE, SODIUM LACTATE AND CALCIUM CHLORIDE: 600; 310; 30; 20 INJECTION, SOLUTION INTRAVENOUS at 21:54

## 2022-11-14 RX ADMIN — OXYCODONE 5 MG: 5 TABLET ORAL at 09:45

## 2022-11-14 RX ADMIN — GABAPENTIN 300 MG: 300 CAPSULE ORAL at 14:34

## 2022-11-14 RX ADMIN — ACETAMINOPHEN 1000 MG: 500 TABLET ORAL at 06:25

## 2022-11-14 RX ADMIN — ENOXAPARIN SODIUM 30 MG: 100 INJECTION SUBCUTANEOUS at 20:18

## 2022-11-14 RX ADMIN — ACETAMINOPHEN 1000 MG: 500 TABLET ORAL at 20:17

## 2022-11-14 RX ADMIN — METHOCARBAMOL TABLETS 750 MG: 750 TABLET, COATED ORAL at 02:00

## 2022-11-14 RX ADMIN — DOCUSATE SODIUM 50 MG AND SENNOSIDES 8.6 MG 1 TABLET: 8.6; 5 TABLET, FILM COATED ORAL at 20:17

## 2022-11-14 RX ADMIN — GABAPENTIN 300 MG: 300 CAPSULE ORAL at 20:17

## 2022-11-14 ASSESSMENT — PAIN SCALES - GENERAL
PAINLEVEL_OUTOF10: 8
PAINLEVEL_OUTOF10: 6
PAINLEVEL_OUTOF10: 8
PAINLEVEL_OUTOF10: 7
PAINLEVEL_OUTOF10: 8
PAINLEVEL_OUTOF10: 8

## 2022-11-14 ASSESSMENT — PAIN DESCRIPTION - LOCATION
LOCATION: ARM;HAND;LEG
LOCATION: HAND
LOCATION: HAND;ARM;LEG
LOCATION: HAND
LOCATION: HAND;SHOULDER
LOCATION: HAND
LOCATION: HAND

## 2022-11-14 ASSESSMENT — PAIN DESCRIPTION - DESCRIPTORS
DESCRIPTORS: ACHING;DISCOMFORT
DESCRIPTORS: THROBBING;SQUEEZING
DESCRIPTORS: ACHING;DISCOMFORT
DESCRIPTORS: ACHING;SPASM;TENDER;THROBBING
DESCRIPTORS: THROBBING;SORE;TENDER

## 2022-11-14 ASSESSMENT — PAIN DESCRIPTION - ORIENTATION
ORIENTATION: RIGHT;LEFT

## 2022-11-14 ASSESSMENT — PAIN - FUNCTIONAL ASSESSMENT
PAIN_FUNCTIONAL_ASSESSMENT: PREVENTS OR INTERFERES SOME ACTIVE ACTIVITIES AND ADLS
PAIN_FUNCTIONAL_ASSESSMENT: PREVENTS OR INTERFERES SOME ACTIVE ACTIVITIES AND ADLS

## 2022-11-14 ASSESSMENT — PAIN DESCRIPTION - PAIN TYPE
TYPE: ACUTE PAIN
TYPE: ACUTE PAIN

## 2022-11-14 NOTE — PLAN OF CARE
Progressing  11/14/2022 0312 by Shira Dias RN  Outcome: Progressing     Problem: ABCDS Injury Assessment  Goal: Absence of physical injury  11/14/2022 1009 by Tanna Manzano RN  Outcome: Progressing  11/14/2022 0313 by Shira Dias RN  Outcome: Progressing  11/14/2022 0312 by Shira Dias RN  Outcome: Progressing     Problem: Safety - Medical Restraint  Goal: Remains free of injury from restraints (Restraint for Interference with Medical Device)  Description: INTERVENTIONS:  1. Determine that other, less restrictive measures have been tried or would not be effective before applying the restraint  2. Evaluate the patient's condition at the time of restraint application  3. Inform patient/family regarding the reason for restraint  4.  Q2H: Monitor safety, psychosocial status, comfort, nutrition and hydration  11/14/2022 1009 by Tanna Manzano RN  Outcome: Progressing  11/14/2022 0313 by Sihra Dias RN  Outcome: Progressing  11/14/2022 0312 by Shira Dias RN  Outcome: Progressing     Problem: Nutrition Deficit:  Goal: Optimize nutritional status  11/14/2022 1009 by Tanna Manzano RN  Outcome: Progressing  11/14/2022 0313 by Shira Dias RN  Outcome: Progressing  11/14/2022 0312 by Shira Dias RN  Outcome: Progressing
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
## 2022-11-14 NOTE — PLAN OF CARE
Problem: Discharge Planning  Goal: Discharge to home or other facility with appropriate resources  11/14/2022 0313 by Patricia Hayes RN  Outcome: Progressing  11/14/2022 0312 by Patricia Hayes RN  Outcome: Progressing  Flowsheets (Taken 11/13/2022 2000)  Discharge to home or other facility with appropriate resources: Identify barriers to discharge with patient and caregiver     Problem: Pain  Goal: Verbalizes/displays adequate comfort level or baseline comfort level  11/14/2022 0313 by Patricia Hayes RN  Outcome: Progressing  11/14/2022 0312 by Patricia Hayes RN  Outcome: Progressing  Flowsheets (Taken 11/13/2022 2000)  Verbalizes/displays adequate comfort level or baseline comfort level: Encourage patient to monitor pain and request assistance     Problem: Safety - Adult  Goal: Free from fall injury  11/14/2022 0313 by Patricia Hayes RN  Outcome: Progressing  11/14/2022 0312 by Patricia Hayes RN  Outcome: Progressing     Problem: Skin/Tissue Integrity  Goal: Absence of new skin breakdown  Description: 1. Monitor for areas of redness and/or skin breakdown  2. Assess vascular access sites hourly  3. Every 4-6 hours minimum:  Change oxygen saturation probe site  4. Every 4-6 hours:  If on nasal continuous positive airway pressure, respiratory therapy assess nares and determine need for appliance change or resting period.   11/14/2022 0313 by Patricia Hayes RN  Outcome: Progressing  11/14/2022 0312 by Patricia Hayes RN  Outcome: Progressing     Problem: Respiratory - Adult  Goal: Achieves optimal ventilation and oxygenation  11/14/2022 0313 by Patricia Hayes RN  Outcome: Progressing  11/14/2022 0312 by Patricia Hayes RN  Outcome: Progressing     Problem: ABCDS Injury Assessment  Goal: Absence of physical injury  11/14/2022 0313 by Patricia Hayes RN  Outcome: Progressing  11/14/2022 0312 by Patricia Hayes RN  Outcome: Progressing     Problem: Safety - Medical Restraint  Goal: Remains free of injury from restraints (Restraint for Interference with Medical Device)  Description: INTERVENTIONS:  1. Determine that other, less restrictive measures have been tried or would not be effective before applying the restraint  2. Evaluate the patient's condition at the time of restraint application  3. Inform patient/family regarding the reason for restraint  4.  Q2H: Monitor safety, psychosocial status, comfort, nutrition and hydration  11/14/2022 0313 by Chantal Lay RN  Outcome: Progressing  11/14/2022 0312 by Chantal Lay RN  Outcome: Progressing     Problem: Nutrition Deficit:  Goal: Optimize nutritional status  11/14/2022 0313 by Chantal Lay RN  Outcome: Progressing  11/14/2022 0312 by Chantal Lay RN  Outcome: Progressing
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
## 2022-11-15 ENCOUNTER — APPOINTMENT (OUTPATIENT)
Dept: GENERAL RADIOLOGY | Age: 42
DRG: 463 | End: 2022-11-15
Payer: COMMERCIAL

## 2022-11-15 ENCOUNTER — ANESTHESIA (OUTPATIENT)
Dept: OPERATING ROOM | Age: 42
DRG: 463 | End: 2022-11-15
Payer: COMMERCIAL

## 2022-11-15 LAB
ABSOLUTE EOS #: 0.17 K/UL (ref 0–0.4)
ABSOLUTE IMMATURE GRANULOCYTE: 1.02 K/UL (ref 0–0.3)
ABSOLUTE LYMPH #: 0.85 K/UL (ref 1–4.8)
ABSOLUTE MONO #: 1.36 K/UL (ref 0.1–0.8)
ANION GAP SERPL CALCULATED.3IONS-SCNC: 9 MMOL/L (ref 9–17)
BASOPHILS # BLD: 0 % (ref 0–2)
BASOPHILS ABSOLUTE: 0 K/UL (ref 0–0.2)
BUN BLDV-MCNC: 15 MG/DL (ref 6–20)
CALCIUM SERPL-MCNC: 8 MG/DL (ref 8.6–10.4)
CHLORIDE BLD-SCNC: 103 MMOL/L (ref 98–107)
CO2: 26 MMOL/L (ref 20–31)
CREAT SERPL-MCNC: 0.71 MG/DL (ref 0.7–1.2)
EOSINOPHILS RELATIVE PERCENT: 1 % (ref 1–4)
GFR SERPL CREATININE-BSD FRML MDRD: >60 ML/MIN/1.73M2
GLUCOSE BLD-MCNC: 117 MG/DL (ref 70–99)
HCT VFR BLD CALC: 23.5 % (ref 40.7–50.3)
HEMOGLOBIN: 7.7 G/DL (ref 13–17)
IMMATURE GRANULOCYTES: 6 %
LYMPHOCYTES # BLD: 5 % (ref 24–44)
MAGNESIUM: 2.3 MG/DL (ref 1.6–2.6)
MCH RBC QN AUTO: 30.1 PG (ref 25.2–33.5)
MCHC RBC AUTO-ENTMCNC: 32.8 G/DL (ref 28.4–34.8)
MCV RBC AUTO: 91.8 FL (ref 82.6–102.9)
MONOCYTES # BLD: 8 % (ref 1–7)
MORPHOLOGY: ABNORMAL
NRBC AUTOMATED: 0.4 PER 100 WBC
PDW BLD-RTO: 14 % (ref 11.8–14.4)
PLATELET # BLD: 247 K/UL (ref 138–453)
PMV BLD AUTO: 10.6 FL (ref 8.1–13.5)
POTASSIUM SERPL-SCNC: 5.1 MMOL/L (ref 3.7–5.3)
RBC # BLD: 2.56 M/UL (ref 4.21–5.77)
SEG NEUTROPHILS: 80 % (ref 36–66)
SEGMENTED NEUTROPHILS ABSOLUTE COUNT: 13.6 K/UL (ref 1.8–7.7)
SODIUM BLD-SCNC: 138 MMOL/L (ref 135–144)
TOTAL CK: 9830 U/L (ref 39–308)
WBC # BLD: 17 K/UL (ref 3.5–11.3)

## 2022-11-15 PROCEDURE — 82550 ASSAY OF CK (CPK): CPT

## 2022-11-15 PROCEDURE — C1889 IMPLANT/INSERT DEVICE, NOC: HCPCS | Performed by: STUDENT IN AN ORGANIZED HEALTH CARE EDUCATION/TRAINING PROGRAM

## 2022-11-15 PROCEDURE — 36415 COLL VENOUS BLD VENIPUNCTURE: CPT

## 2022-11-15 PROCEDURE — 27310 EXPLORATION OF KNEE JOINT: CPT | Performed by: STUDENT IN AN ORGANIZED HEALTH CARE EDUCATION/TRAINING PROGRAM

## 2022-11-15 PROCEDURE — 2500000003 HC RX 250 WO HCPCS

## 2022-11-15 PROCEDURE — 3700000000 HC ANESTHESIA ATTENDED CARE: Performed by: STUDENT IN AN ORGANIZED HEALTH CARE EDUCATION/TRAINING PROGRAM

## 2022-11-15 PROCEDURE — 6360000002 HC RX W HCPCS: Performed by: STUDENT IN AN ORGANIZED HEALTH CARE EDUCATION/TRAINING PROGRAM

## 2022-11-15 PROCEDURE — 3209999900 FLUORO FOR SURGICAL PROCEDURES

## 2022-11-15 PROCEDURE — 73590 X-RAY EXAM OF LOWER LEG: CPT

## 2022-11-15 PROCEDURE — 6370000000 HC RX 637 (ALT 250 FOR IP): Performed by: STUDENT IN AN ORGANIZED HEALTH CARE EDUCATION/TRAINING PROGRAM

## 2022-11-15 PROCEDURE — 6360000002 HC RX W HCPCS: Performed by: ANESTHESIOLOGY

## 2022-11-15 PROCEDURE — 2060000000 HC ICU INTERMEDIATE R&B

## 2022-11-15 PROCEDURE — 0QUH07Z SUPPLEMENT LEFT TIBIA WITH AUTOLOGOUS TISSUE SUBSTITUTE, OPEN APPROACH: ICD-10-PCS | Performed by: STUDENT IN AN ORGANIZED HEALTH CARE EDUCATION/TRAINING PROGRAM

## 2022-11-15 PROCEDURE — 2500000003 HC RX 250 WO HCPCS: Performed by: NURSE ANESTHETIST, CERTIFIED REGISTERED

## 2022-11-15 PROCEDURE — 73562 X-RAY EXAM OF KNEE 3: CPT

## 2022-11-15 PROCEDURE — 80048 BASIC METABOLIC PNL TOTAL CA: CPT

## 2022-11-15 PROCEDURE — 27535 TREAT KNEE FRACTURE: CPT | Performed by: STUDENT IN AN ORGANIZED HEALTH CARE EDUCATION/TRAINING PROGRAM

## 2022-11-15 PROCEDURE — 3700000001 HC ADD 15 MINUTES (ANESTHESIA): Performed by: STUDENT IN AN ORGANIZED HEALTH CARE EDUCATION/TRAINING PROGRAM

## 2022-11-15 PROCEDURE — 0QSH04Z REPOSITION LEFT TIBIA WITH INTERNAL FIXATION DEVICE, OPEN APPROACH: ICD-10-PCS | Performed by: STUDENT IN AN ORGANIZED HEALTH CARE EDUCATION/TRAINING PROGRAM

## 2022-11-15 PROCEDURE — 2709999900 HC NON-CHARGEABLE SUPPLY: Performed by: STUDENT IN AN ORGANIZED HEALTH CARE EDUCATION/TRAINING PROGRAM

## 2022-11-15 PROCEDURE — 2500000003 HC RX 250 WO HCPCS: Performed by: STUDENT IN AN ORGANIZED HEALTH CARE EDUCATION/TRAINING PROGRAM

## 2022-11-15 PROCEDURE — 3600000014 HC SURGERY LEVEL 4 ADDTL 15MIN: Performed by: STUDENT IN AN ORGANIZED HEALTH CARE EDUCATION/TRAINING PROGRAM

## 2022-11-15 PROCEDURE — 2580000003 HC RX 258: Performed by: STUDENT IN AN ORGANIZED HEALTH CARE EDUCATION/TRAINING PROGRAM

## 2022-11-15 PROCEDURE — P9047 ALBUMIN (HUMAN), 25%, 50ML: HCPCS | Performed by: NURSE ANESTHETIST, CERTIFIED REGISTERED

## 2022-11-15 PROCEDURE — 15271 SKIN SUB GRAFT TRNK/ARM/LEG: CPT | Performed by: STUDENT IN AN ORGANIZED HEALTH CARE EDUCATION/TRAINING PROGRAM

## 2022-11-15 PROCEDURE — 0HRLXK3 REPLACEMENT OF LEFT LOWER LEG SKIN WITH NONAUTOLOGOUS TISSUE SUBSTITUTE, FULL THICKNESS, EXTERNAL APPROACH: ICD-10-PCS | Performed by: STUDENT IN AN ORGANIZED HEALTH CARE EDUCATION/TRAINING PROGRAM

## 2022-11-15 PROCEDURE — 83735 ASSAY OF MAGNESIUM: CPT

## 2022-11-15 PROCEDURE — 7100000001 HC PACU RECOVERY - ADDTL 15 MIN: Performed by: STUDENT IN AN ORGANIZED HEALTH CARE EDUCATION/TRAINING PROGRAM

## 2022-11-15 PROCEDURE — 94761 N-INVAS EAR/PLS OXIMETRY MLT: CPT

## 2022-11-15 PROCEDURE — 27784 TREATMENT OF FIBULA FRACTURE: CPT | Performed by: STUDENT IN AN ORGANIZED HEALTH CARE EDUCATION/TRAINING PROGRAM

## 2022-11-15 PROCEDURE — C1713 ANCHOR/SCREW BN/BN,TIS/BN: HCPCS | Performed by: STUDENT IN AN ORGANIZED HEALTH CARE EDUCATION/TRAINING PROGRAM

## 2022-11-15 PROCEDURE — 2720000010 HC SURG SUPPLY STERILE: Performed by: STUDENT IN AN ORGANIZED HEALTH CARE EDUCATION/TRAINING PROGRAM

## 2022-11-15 PROCEDURE — 6360000002 HC RX W HCPCS: Performed by: NURSE ANESTHETIST, CERTIFIED REGISTERED

## 2022-11-15 PROCEDURE — 85025 COMPLETE CBC W/AUTO DIFF WBC: CPT

## 2022-11-15 PROCEDURE — 15272 SKIN SUB GRAFT T/A/L ADD-ON: CPT | Performed by: STUDENT IN AN ORGANIZED HEALTH CARE EDUCATION/TRAINING PROGRAM

## 2022-11-15 PROCEDURE — 0S9D0ZZ DRAINAGE OF LEFT KNEE JOINT, OPEN APPROACH: ICD-10-PCS | Performed by: STUDENT IN AN ORGANIZED HEALTH CARE EDUCATION/TRAINING PROGRAM

## 2022-11-15 PROCEDURE — 3600000004 HC SURGERY LEVEL 4 BASE: Performed by: STUDENT IN AN ORGANIZED HEALTH CARE EDUCATION/TRAINING PROGRAM

## 2022-11-15 PROCEDURE — 2580000003 HC RX 258

## 2022-11-15 PROCEDURE — 6360000002 HC RX W HCPCS

## 2022-11-15 PROCEDURE — 7100000000 HC PACU RECOVERY - FIRST 15 MIN: Performed by: STUDENT IN AN ORGANIZED HEALTH CARE EDUCATION/TRAINING PROGRAM

## 2022-11-15 DEVICE — INTEGRA FLOWABLE WOUND MATRIX
Type: IMPLANTABLE DEVICE | Site: FIBULA | Status: FUNCTIONAL
Brand: INTEGRA

## 2022-11-15 DEVICE — SCREW BNE L70MM DIA3.5MM PROX TIB S STL ST FULL THRD T15: Type: IMPLANTABLE DEVICE | Site: TIBIA | Status: FUNCTIONAL

## 2022-11-15 DEVICE — PLATE BNE L87MM 4 H NONSTERILE L PROX TIB S STL VAR ANG LOK 02127211] DEPUY SYNTHES USA]: Type: IMPLANTABLE DEVICE | Site: TIBIA | Status: FUNCTIONAL

## 2022-11-15 DEVICE — NAIL IM L440MM DIA3MM PROX TIB G TI ALLY: Type: IMPLANTABLE DEVICE | Site: FIBULA | Status: FUNCTIONAL

## 2022-11-15 DEVICE — SCREW BNE L80MM DIA3.5MM PROX TIB S STL ST FULL THRD T15: Type: IMPLANTABLE DEVICE | Site: TIBIA | Status: FUNCTIONAL

## 2022-11-15 DEVICE — INTEGRA® MESHED BILAYER WOUND MATRIX 4 IN*10 IN (10 CM*25 CM)
Type: IMPLANTABLE DEVICE | Site: FIBULA | Status: FUNCTIONAL
Brand: INTEGRA®

## 2022-11-15 DEVICE — DRESSING WND MICRONIZED PARTIC 1000 MG MATRISTEM MICROMATRIX: Type: IMPLANTABLE DEVICE | Site: FIBULA | Status: FUNCTIONAL

## 2022-11-15 DEVICE — SCREW BNE L38MM DIA3.5MM CORT S STL ST NONCANNULATED LOK: Type: IMPLANTABLE DEVICE | Site: TIBIA | Status: FUNCTIONAL

## 2022-11-15 DEVICE — SCREW BNE L65MM DIA3.5MM PROX TIB S STL ST FULL THRD T15: Type: IMPLANTABLE DEVICE | Site: TIBIA | Status: FUNCTIONAL

## 2022-11-15 DEVICE — SCREW BNE L80MM DIA3.5MM PELV CORT S STL ST THRD SM HEX: Type: IMPLANTABLE DEVICE | Site: TIBIA | Status: FUNCTIONAL

## 2022-11-15 DEVICE — GRAFT BNE CHIP FRZ DRY CANC CORT 1MM 8MM RANG 10CC READIGRFT: Type: IMPLANTABLE DEVICE | Site: TIBIA | Status: FUNCTIONAL

## 2022-11-15 DEVICE — SCREW BNE L44MM DIA3.5MM CORT S STL ST NONCANNULATED LOK: Type: IMPLANTABLE DEVICE | Site: TIBIA | Status: FUNCTIONAL

## 2022-11-15 RX ORDER — ROCURONIUM BROMIDE 10 MG/ML
INJECTION, SOLUTION INTRAVENOUS PRN
Status: DISCONTINUED | OUTPATIENT
Start: 2022-11-15 | End: 2022-11-15 | Stop reason: SDUPTHER

## 2022-11-15 RX ORDER — ALBUMIN (HUMAN) 12.5 G/50ML
SOLUTION INTRAVENOUS PRN
Status: DISCONTINUED | OUTPATIENT
Start: 2022-11-15 | End: 2022-11-15 | Stop reason: SDUPTHER

## 2022-11-15 RX ORDER — MAGNESIUM SULFATE 1 G/100ML
INJECTION INTRAVENOUS PRN
Status: DISCONTINUED | OUTPATIENT
Start: 2022-11-15 | End: 2022-11-15 | Stop reason: SDUPTHER

## 2022-11-15 RX ORDER — FENTANYL CITRATE 50 UG/ML
INJECTION, SOLUTION INTRAMUSCULAR; INTRAVENOUS PRN
Status: DISCONTINUED | OUTPATIENT
Start: 2022-11-15 | End: 2022-11-15 | Stop reason: SDUPTHER

## 2022-11-15 RX ORDER — SODIUM CHLORIDE 0.9 % (FLUSH) 0.9 %
5-40 SYRINGE (ML) INJECTION PRN
Status: DISCONTINUED | OUTPATIENT
Start: 2022-11-15 | End: 2022-11-15 | Stop reason: HOSPADM

## 2022-11-15 RX ORDER — ONDANSETRON 2 MG/ML
INJECTION INTRAMUSCULAR; INTRAVENOUS PRN
Status: DISCONTINUED | OUTPATIENT
Start: 2022-11-15 | End: 2022-11-15 | Stop reason: SDUPTHER

## 2022-11-15 RX ORDER — PROPOFOL 10 MG/ML
INJECTION, EMULSION INTRAVENOUS PRN
Status: DISCONTINUED | OUTPATIENT
Start: 2022-11-15 | End: 2022-11-15 | Stop reason: SDUPTHER

## 2022-11-15 RX ORDER — TRANEXAMIC ACID 10 MG/ML
INJECTION, SOLUTION INTRAVENOUS PRN
Status: DISCONTINUED | OUTPATIENT
Start: 2022-11-15 | End: 2022-11-15 | Stop reason: SDUPTHER

## 2022-11-15 RX ORDER — MAGNESIUM HYDROXIDE 1200 MG/15ML
LIQUID ORAL CONTINUOUS PRN
Status: DISCONTINUED | OUTPATIENT
Start: 2022-11-15 | End: 2022-11-15 | Stop reason: HOSPADM

## 2022-11-15 RX ORDER — LIDOCAINE HYDROCHLORIDE 10 MG/ML
INJECTION, SOLUTION EPIDURAL; INFILTRATION; INTRACAUDAL; PERINEURAL PRN
Status: DISCONTINUED | OUTPATIENT
Start: 2022-11-15 | End: 2022-11-15 | Stop reason: SDUPTHER

## 2022-11-15 RX ORDER — KETAMINE HCL IN NACL, ISO-OSM 100MG/10ML
SYRINGE (ML) INJECTION PRN
Status: DISCONTINUED | OUTPATIENT
Start: 2022-11-15 | End: 2022-11-15 | Stop reason: SDUPTHER

## 2022-11-15 RX ORDER — DEXAMETHASONE SODIUM PHOSPHATE 10 MG/ML
INJECTION INTRAMUSCULAR; INTRAVENOUS PRN
Status: DISCONTINUED | OUTPATIENT
Start: 2022-11-15 | End: 2022-11-15 | Stop reason: SDUPTHER

## 2022-11-15 RX ORDER — GLYCOPYRROLATE 0.2 MG/ML
INJECTION INTRAMUSCULAR; INTRAVENOUS PRN
Status: DISCONTINUED | OUTPATIENT
Start: 2022-11-15 | End: 2022-11-15 | Stop reason: SDUPTHER

## 2022-11-15 RX ORDER — VANCOMYCIN HYDROCHLORIDE 1 G/20ML
INJECTION, POWDER, LYOPHILIZED, FOR SOLUTION INTRAVENOUS PRN
Status: DISCONTINUED | OUTPATIENT
Start: 2022-11-15 | End: 2022-11-15 | Stop reason: HOSPADM

## 2022-11-15 RX ORDER — MIDAZOLAM HYDROCHLORIDE 1 MG/ML
INJECTION INTRAMUSCULAR; INTRAVENOUS PRN
Status: DISCONTINUED | OUTPATIENT
Start: 2022-11-15 | End: 2022-11-15 | Stop reason: SDUPTHER

## 2022-11-15 RX ORDER — SODIUM CHLORIDE 0.9 % (FLUSH) 0.9 %
5-40 SYRINGE (ML) INJECTION EVERY 12 HOURS SCHEDULED
Status: DISCONTINUED | OUTPATIENT
Start: 2022-11-15 | End: 2022-11-15 | Stop reason: HOSPADM

## 2022-11-15 RX ORDER — NEOSTIGMINE METHYLSULFATE 5 MG/5 ML
SYRINGE (ML) INTRAVENOUS PRN
Status: DISCONTINUED | OUTPATIENT
Start: 2022-11-15 | End: 2022-11-15 | Stop reason: SDUPTHER

## 2022-11-15 RX ORDER — KETOROLAC TROMETHAMINE 30 MG/ML
INJECTION, SOLUTION INTRAMUSCULAR; INTRAVENOUS PRN
Status: DISCONTINUED | OUTPATIENT
Start: 2022-11-15 | End: 2022-11-15 | Stop reason: SDUPTHER

## 2022-11-15 RX ORDER — SODIUM CHLORIDE 9 MG/ML
INJECTION, SOLUTION INTRAVENOUS PRN
Status: DISCONTINUED | OUTPATIENT
Start: 2022-11-15 | End: 2022-11-15 | Stop reason: HOSPADM

## 2022-11-15 RX ADMIN — Medication 10 MG: at 00:19

## 2022-11-15 RX ADMIN — ENOXAPARIN SODIUM 30 MG: 100 INJECTION SUBCUTANEOUS at 22:53

## 2022-11-15 RX ADMIN — ACETAMINOPHEN 1000 MG: 500 TABLET ORAL at 14:12

## 2022-11-15 RX ADMIN — HYDROMORPHONE HYDROCHLORIDE 0.5 MG: 1 INJECTION, SOLUTION INTRAMUSCULAR; INTRAVENOUS; SUBCUTANEOUS at 10:22

## 2022-11-15 RX ADMIN — KETOROLAC TROMETHAMINE 30 MG: 30 INJECTION, SOLUTION INTRAMUSCULAR; INTRAVENOUS at 10:24

## 2022-11-15 RX ADMIN — PROPOFOL 200 MG: 10 INJECTION, EMULSION INTRAVENOUS at 07:16

## 2022-11-15 RX ADMIN — BACITRACIN: 500 OINTMENT TOPICAL at 22:52

## 2022-11-15 RX ADMIN — ROCURONIUM BROMIDE 50 MG: 10 INJECTION, SOLUTION INTRAVENOUS at 07:16

## 2022-11-15 RX ADMIN — LIDOCAINE HYDROCHLORIDE 50 MG: 10 INJECTION, SOLUTION EPIDURAL; INFILTRATION; INTRACAUDAL; PERINEURAL at 07:16

## 2022-11-15 RX ADMIN — Medication 4 MG: at 10:22

## 2022-11-15 RX ADMIN — ONDANSETRON 4 MG: 2 INJECTION INTRAMUSCULAR; INTRAVENOUS at 10:24

## 2022-11-15 RX ADMIN — OXYCODONE 5 MG: 5 TABLET ORAL at 15:29

## 2022-11-15 RX ADMIN — ALBUMIN (HUMAN) 12.5 G: 0.25 INJECTION, SOLUTION INTRAVENOUS at 08:15

## 2022-11-15 RX ADMIN — CLINDAMYCIN PHOSPHATE 600 MG: 600 INJECTION, SOLUTION INTRAVENOUS at 07:25

## 2022-11-15 RX ADMIN — GABAPENTIN 300 MG: 300 CAPSULE ORAL at 22:51

## 2022-11-15 RX ADMIN — ROCURONIUM BROMIDE 30 MG: 10 INJECTION, SOLUTION INTRAVENOUS at 07:30

## 2022-11-15 RX ADMIN — METHOCARBAMOL TABLETS 750 MG: 750 TABLET, COATED ORAL at 18:19

## 2022-11-15 RX ADMIN — CLINDAMYCIN PHOSPHATE 600 MG: 600 INJECTION, SOLUTION INTRAVENOUS at 02:06

## 2022-11-15 RX ADMIN — FENTANYL CITRATE 150 MCG: 0.05 INJECTION, SOLUTION INTRAMUSCULAR; INTRAVENOUS at 07:16

## 2022-11-15 RX ADMIN — MIDAZOLAM 2 MG: 1 INJECTION INTRAMUSCULAR; INTRAVENOUS at 07:10

## 2022-11-15 RX ADMIN — DEXAMETHASONE SODIUM PHOSPHATE 10 MG: 10 INJECTION INTRAMUSCULAR; INTRAVENOUS at 07:22

## 2022-11-15 RX ADMIN — TRANEXAMIC ACID 1 G: 10 INJECTION, SOLUTION INTRAVENOUS at 07:44

## 2022-11-15 RX ADMIN — IBUPROFEN 400 MG: 400 TABLET, FILM COATED ORAL at 18:19

## 2022-11-15 RX ADMIN — SODIUM CHLORIDE, POTASSIUM CHLORIDE, SODIUM LACTATE AND CALCIUM CHLORIDE: 600; 310; 30; 20 INJECTION, SOLUTION INTRAVENOUS at 12:38

## 2022-11-15 RX ADMIN — IBUPROFEN 400 MG: 400 TABLET, FILM COATED ORAL at 14:15

## 2022-11-15 RX ADMIN — HYDRALAZINE HYDROCHLORIDE 10 MG: 20 INJECTION INTRAMUSCULAR; INTRAVENOUS at 02:03

## 2022-11-15 RX ADMIN — HYDROMORPHONE HYDROCHLORIDE 0.5 MG: 1 INJECTION, SOLUTION INTRAMUSCULAR; INTRAVENOUS; SUBCUTANEOUS at 09:39

## 2022-11-15 RX ADMIN — TRANEXAMIC ACID 1 G: 10 INJECTION, SOLUTION INTRAVENOUS at 10:16

## 2022-11-15 RX ADMIN — Medication 10 MG: at 08:43

## 2022-11-15 RX ADMIN — SODIUM CHLORIDE, POTASSIUM CHLORIDE, SODIUM LACTATE AND CALCIUM CHLORIDE: 600; 310; 30; 20 INJECTION, SOLUTION INTRAVENOUS at 10:07

## 2022-11-15 RX ADMIN — HYDROMORPHONE HYDROCHLORIDE 0.5 MG: 1 INJECTION, SOLUTION INTRAMUSCULAR; INTRAVENOUS; SUBCUTANEOUS at 11:12

## 2022-11-15 RX ADMIN — ACETAMINOPHEN 1000 MG: 500 TABLET ORAL at 22:51

## 2022-11-15 RX ADMIN — Medication 30 MG: at 07:22

## 2022-11-15 RX ADMIN — BACITRACIN: 500 OINTMENT TOPICAL at 14:12

## 2022-11-15 RX ADMIN — FENTANYL CITRATE 50 MCG: 0.05 INJECTION, SOLUTION INTRAMUSCULAR; INTRAVENOUS at 08:43

## 2022-11-15 RX ADMIN — METHOCARBAMOL TABLETS 750 MG: 750 TABLET, COATED ORAL at 02:03

## 2022-11-15 RX ADMIN — SODIUM CHLORIDE, POTASSIUM CHLORIDE, SODIUM LACTATE AND CALCIUM CHLORIDE: 600; 310; 30; 20 INJECTION, SOLUTION INTRAVENOUS at 07:10

## 2022-11-15 RX ADMIN — ROCURONIUM BROMIDE 20 MG: 10 INJECTION, SOLUTION INTRAVENOUS at 09:32

## 2022-11-15 RX ADMIN — FENTANYL CITRATE 50 MCG: 0.05 INJECTION, SOLUTION INTRAMUSCULAR; INTRAVENOUS at 08:56

## 2022-11-15 RX ADMIN — OXYCODONE 5 MG: 5 TABLET ORAL at 02:03

## 2022-11-15 RX ADMIN — OXYCODONE 5 MG: 5 TABLET ORAL at 22:50

## 2022-11-15 RX ADMIN — IBUPROFEN 400 MG: 400 TABLET, FILM COATED ORAL at 02:03

## 2022-11-15 RX ADMIN — ROCURONIUM BROMIDE 20 MG: 10 INJECTION, SOLUTION INTRAVENOUS at 08:42

## 2022-11-15 RX ADMIN — OXYCODONE 5 MG: 5 TABLET ORAL at 18:19

## 2022-11-15 RX ADMIN — Medication 10 MG: at 09:43

## 2022-11-15 RX ADMIN — GLYCOPYRROLATE 0.4 MG: 0.2 INJECTION INTRAMUSCULAR; INTRAVENOUS at 10:22

## 2022-11-15 RX ADMIN — CLINDAMYCIN PHOSPHATE 600 MG: 600 INJECTION, SOLUTION INTRAVENOUS at 18:22

## 2022-11-15 RX ADMIN — IBUPROFEN 400 MG: 400 TABLET, FILM COATED ORAL at 22:51

## 2022-11-15 RX ADMIN — MAGNESIUM SULFATE HEPTAHYDRATE 2000 MG: 1 INJECTION, SOLUTION INTRAVENOUS at 07:43

## 2022-11-15 RX ADMIN — GABAPENTIN 300 MG: 300 CAPSULE ORAL at 14:13

## 2022-11-15 ASSESSMENT — PAIN SCALES - GENERAL
PAINLEVEL_OUTOF10: 9
PAINLEVEL_OUTOF10: 10
PAINLEVEL_OUTOF10: 5
PAINLEVEL_OUTOF10: 6
PAINLEVEL_OUTOF10: 7
PAINLEVEL_OUTOF10: 6
PAINLEVEL_OUTOF10: 6

## 2022-11-15 ASSESSMENT — PAIN DESCRIPTION - ORIENTATION: ORIENTATION: RIGHT;LEFT

## 2022-11-15 ASSESSMENT — PAIN DESCRIPTION - LOCATION
LOCATION: ARM;HAND;LEG
LOCATION: SHOULDER;LEG

## 2022-11-15 ASSESSMENT — PAIN - FUNCTIONAL ASSESSMENT: PAIN_FUNCTIONAL_ASSESSMENT: 0-10

## 2022-11-15 ASSESSMENT — PAIN DESCRIPTION - DESCRIPTORS: DESCRIPTORS: ACHING;DISCOMFORT

## 2022-11-15 ASSESSMENT — PAIN SCALES - WONG BAKER: WONGBAKER_NUMERICALRESPONSE: 0

## 2022-11-15 NOTE — PLAN OF CARE
Problem: Discharge Planning  Goal: Discharge to home or other facility with appropriate resources  Outcome: Progressing     Problem: Pain  Goal: Verbalizes/displays adequate comfort level or baseline comfort level  Outcome: Progressing     Problem: Safety - Adult  Goal: Free from fall injury  Outcome: Progressing     Problem: Skin/Tissue Integrity  Goal: Absence of new skin breakdown  Description: 1. Monitor for areas of redness and/or skin breakdown  2. Assess vascular access sites hourly  3. Every 4-6 hours minimum:  Change oxygen saturation probe site  4. Every 4-6 hours:  If on nasal continuous positive airway pressure, respiratory therapy assess nares and determine need for appliance change or resting period. Outcome: Progressing     Problem: Respiratory - Adult  Goal: Achieves optimal ventilation and oxygenation  Outcome: Progressing     Problem: ABCDS Injury Assessment  Goal: Absence of physical injury  Outcome: Progressing     Problem: Safety - Medical Restraint  Goal: Remains free of injury from restraints (Restraint for Interference with Medical Device)  Description: INTERVENTIONS:  1. Determine that other, less restrictive measures have been tried or would not be effective before applying the restraint  2. Evaluate the patient's condition at the time of restraint application  3. Inform patient/family regarding the reason for restraint  4.  Q2H: Monitor safety, psychosocial status, comfort, nutrition and hydration  Outcome: Progressing     Problem: Nutrition Deficit:  Goal: Optimize nutritional status  11/15/2022 1450 by Palak Rondon RN  Outcome: Progressing  11/15/2022 1345 by Alexis Gonzalez RD, LD  Flowsheets (Taken 11/15/2022 1336)  Nutrient intake appropriate for improving, restoring, or maintaining nutritional needs:   Assess nutritional status and recommend course of action   Monitor oral intake, labs, and treatment plans   Recommend appropriate diets, oral nutritional supplements, and vitamin/mineral supplements

## 2022-11-15 NOTE — BRIEF OP NOTE
Brief Postoperative Note      Patient: Elroy Hill  YOB: 1980  MRN: [id number]    Date of Procedure: 11/15/2022    Pre-Op Diagnosis:   - Left open fibula fracture  - Left leg open wound, measuring 20x8  - Left lateral tibial plateau fracture    Post-Op Diagnosis: Same       Procedure(s)  -Left leg irrigation and excisional debridement skin down to and including bone; 20x8cm   -Open reduction internal fixation of unicondylar tibial plateau, left   -Revision open reduction internal fixation of fibula, left  -Application of acellular matrix xenograft of left leg measuring 65G8QP  -Application of negative pressure wound vac  -Drainage hemarthrosis left knee    Surgeon(s):  Inna Archer DO    Assistant:  Resident: Lauri Jose DO    Anesthesia: General    Estimated Blood Loss (mL): 50    IVF (mL): 1100    Tourniquet time (min): 281    Complications: None    Specimens:   * No specimens in log *    Implants:  Implant Name Type Inv. Item Serial No.  Lot No. LRB No. Used Action   DRESSING WND MICRONIZED PARTIC 1000 MG MATRISTEM MICROMATRIX - [id number]  DRESSING WND MICRONIZED PARTIC 1000 MG MATRISTEM MICROMATRIX [id number] [employer]-WD [id number] Left 1 Implanted   GRAFT BNE CHIP FRZ DRY CANC MICHAEL 1MM 8MM RANG 10CC READIGRFT - [id number]  GRAFT BNE CHIP FRZ DRY CANC MICHAEL 1MM 8MM RANG 10CC READIGRFT [id number] [facility] TISSUE [facility]-WD [de-identified] Left 1 Implanted   PLATE BNE [id number] 4 H NONSTERILE L PROX TIB S STL MARIA M ANG EVERARDO [id number]] DEPUY SYNTHES [place]] - [id number]  PLATE BNE [id number] 4 H NONSTERILE L PROX TIB S STL MARIA M ANG EVERARDO [id number]] DEPU[employer] SYN[employer]ES [place]]  DEPU[employer]-WD  Left 1 Implanted   SCREW BNE L80MM DIA3. 5MM PROX TIB S STL ST FULL THRD T15 - [id number]  SCREW BNE L80MM DIA3. 5MM PROX TIB S STL ST FULL THRD T15  DEPU[employer] [place]-WD  Left 2 Implanted   SCREW BNE L80MM DIA3. 5MM PELV MICHAEL S STL ST THRD SM HEX - [id number]  SCREW BNE L80MM DIA3. 5MM PELV MICHAEL S STL ST THRD SM HEX  DEPU[employer] [place]-WD  Left 1 Implanted   SCREW BNE L70MM DIA3. 5MM PROX TIB S STL ST FULL THRD T15 - [id number]  SCREW BNE L70MM DIA3. 5MM PROX TIB S STL ST FULL THRD T15  DEPUY [employer] [place]-WD  Left 1 Implanted   SCREW BNE L38MM DIA3.5MM MICHAEL S STL ST NONCANNULATED EVERARDO - [id number]  SCREW BNE L38MM DIA3.5MM MICHAEL S STL ST NONCANNULATED EVERARDO  DEPU[employer]-WD  Left 1 Implanted   SCREW BNE L44MM DIA3.5MM MICHAEL S STL ST NONCANNULATED EVERARDO - [id number]  SCREW BNE L44MM DIA3.5MM MICHAEL S STL ST NONCANNULATED EVERARDO  DEPUY SYN[employer]ES [employer]-WD  Left 1 Implanted   NAIL IM L440MM DIA3MM PROX TIB G TI ALLY - [id number]  NAIL IM L440MM DIA3MM PROX TIB G TI ALLY  DEPUY [employer]ES [employer]-WD  Left 1 Implanted   NAIL IM ELASTIC 2X440 MM TI GRN NS - [id number]  NAIL IM ELASTIC 2X440 MM TI GRN NS  DEPUY SYNTHES [place]-WD  Left 1 Explanted   GRAFT BIO TISS 3CC BOV FLOWABLE - [id number]  GRAFT BIO TISS 3CC BOV FLOWABLE  [employer]WD [id number] Left 1 Implanted   DRESSING BIO [id number] BOV TEND CLLGN GLYCOSAMINOGLYCAN - [id number]  DRESSING BIO [id number] BOV TEND CLLGN GLYCOSAMINOGLYCAN  [employer]-WD [id number] Left 1 Implanted         Drains:   Negative Pressure Wound Therapy Leg Anterior;Left;Lower (Active)       NG/OG/NJ/NE Tube Orogastric 18 fr Center mouth (Active)       Urinary Catheter 11/10/22 Miller (Active)   $ Urethral catheter insertion $ Not inserted for procedure 11/10/22 1450   Catheter Indications Need for fluid volume management of the critically ill patient in a critical care setting 11/15/22 0400   Site Assessment No urethral drainage 11/15/22 0400   Urine Color Yellow; Dolores 11/15/22 0438   Urine Appearance Hazy 11/15/22 0400   Collection Container Standard 11/15/22 0400   Securement Method Leg strap 11/15/22 0400   Catheter Care Completed Yes 11/14/22 2000   Catheter Best Practices  Drainage tube clipped to bed;Catheter secured to thigh; Bag below bladder;Bag not on floor; Lack of dependent loop in tubing;Drainage bag less than half full 11/15/22 0400   Status Draining 11/15/22 0400   Output (mL) 1200 mL 11/15/22 0530       [REMOVED] Negative Pressure Wound Therapy Leg Left; Lower; Lateral (Removed)   Wound Type Surgical 11/11/22 0800   Unit Type imer 11/09/22 2330   Dressing Type Black Foam 11/11/22 0800   Cycle Continuous 11/11/22 0800   Target Pressure (mmHg) 125 11/11/22 0800   Canister changed?  No 11/11/22 0800   Dressing Status Clean, dry & intact 11/11/22 0800   Drainage Amount Moderate 11/11/22 0800   Drainage Description Serosanguinous 11/11/22 0800   Output (ml) 50 ml 11/10/22 0610       [REMOVED] Negative Pressure Wound Therapy Leg Anterior;Left;Lower (Removed)   Wound Type Acute/Traumatic;Surgical 11/15/22 0400   Dressing Type Other (Comment) 11/15/22 0400   Dressing Status Clean, dry & intact 11/15/22 0400   Drainage Amount Scant 11/13/22 0800   Drainage Description Sanguinous 11/13/22 0800   Output (ml) 250 ml 11/14/22 0500       [REMOVED] NG/OG/NJ/NE Tube Orogastric 18 fr (Removed)   Surrounding Skin Clean, dry & intact 11/12/22 1600   Securement device Tape 11/12/22 1600   Status Clamped 11/12/22 1600   Placement Verified X-Ray (Initial) 11/12/22 1600   NG/OG/NJ/NE External Measurement (cm) 65 cm 11/12/22 0430   Drainage Appearance Bile 11/12/22 0430   Tube feeding/verify rate (mL/hr) 0 mL/hr 11/11/22 2030   Free Water/Flush (mL) 60 mL 11/12/22 0533   Output (mL) 200 ml 11/12/22 0546       Findings: See op note    Electronically signed by Osmany Odom DO on 11/15/2022 at 1:10 PM

## 2022-11-15 NOTE — PROGRESS NOTES
Physical Therapy        Physical Therapy Cancel Note      DATE: 11/15/2022    NAME: Vitor Persaud  MRN: [id number]   : 1980      Patient not seen this date for Physical Therapy due to:    Surgery/Procedure: Pt to OR, Therapy will resume as able      Electronically signed by Max Johnson PTA on 11/15/2022 at 12:07 PM
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
## 2022-11-15 NOTE — ANESTHESIA POSTPROCEDURE EVALUATION
Department of Anesthesiology  Postprocedure Note    Patient: Aditi Ross  MRN: [id number]  [place]: 1980  Date of evaluation: 11/15/2022      Procedure Summary     Date: 11/15/22 Room / Location: [address]    Anesthesia Start: 0710 Anesthesia Stop: 4604    Procedure: TIBIAL PLATEAU OPEN REDUCTION INTERNAL FIXATION, LEFT FIBULA ELASTIC NAIL EXCHANGE, WOUND VAC EXCHANGE (Left) Diagnosis:       Closed fracture of medial portion of left tibial plateau, initial encounter      Type I or II open fracture of shaft of left fibula, unspecified fracture morphology, initial encounter      (LEFT TIBIAL FRACTURE, LEFT TIBIAL FRACTURE)    Surgeons: Jethro Craven DO Responsible Provider: Alexandria Santo MD    Anesthesia Type: general ASA Status: 3          Anesthesia Type: No value filed.     Cynthia Phase I: Cynthia Score: 9    Cynthia Phase II:        Anesthesia Post Evaluation    Patient location during evaluation: PACU  Patient participation: complete - patient participated  Level of consciousness: awake and alert  Pain score: 3  Airway patency: patent  Nausea & Vomiting: no nausea and no vomiting  Complications: no  Cardiovascular status: hemodynamically stable  Respiratory status: acceptable  Hydration status: euvolemic

## 2022-11-15 NOTE — CARE COORDINATION
Met with pt and family to discuss possible transition plans, will review ARU list and provided choice.

## 2022-11-15 NOTE — PROGRESS NOTES
Comprehensive Nutrition Assessment    Type and Reason for Visit:  Reassess    Nutrition Recommendations/Plan:   Monitor for restart of oral diet. Will provide ONS with meals as/if needed. Will monitor labs, weights, and plan of care. Malnutrition Assessment:  Malnutrition Status:  Insufficient data (11/12/22 1154)    Context:  Acute Illness     Findings of the 6 clinical characteristics of malnutrition:  Energy Intake:  Mild decrease in energy intake during admission. Weight Loss:  Unable to assess     Body Fat Loss:  No significant body fat loss   Muscle Mass Loss:  No significant muscle mass loss  Fluid Accumulation:  Unable to assess   Strength:  Not Performed    Nutrition Assessment:    Pt NPO this morning and off unit for ortho surgery. S/p open reduction internal fixation of left tibial plateau with skin grafting vs.primary closure. Per chart review, pt has been eating % of meals and is taking fluids well. Last BM: 11/15. Labs/Meds reviewed. Nutrition Related Findings:    Labs/Meds reviewed. Last BM: 11/15/22. Wound Type: Multiple, Surgical Incision, Wound Vac (traumatic wounds present)       Current Nutrition Intake & Therapies:    Average Meal Intake: NPO (Recorded PO intakes of % per chart review noted.)  Average Supplements Intake: NPO  ADULT DIET; Regular    Anthropometric Measures:  Height: 5' 11\" (180.3 cm)  Ideal Body Weight (IBW): 172 lbs (78 kg)       Current Body Weight: 284 lb 6.3 oz (129 kg), 165.3 % IBW.     Current BMI (kg/m2): 39.7                          BMI Categories: Obese Class 2 (BMI 35.0 -39.9)    Estimated Daily Nutrient Needs:  Energy Requirements Based On: Kcal/kg  Weight Used for Energy Requirements: Current  Energy (kcal/day): 9920-8882 kcals/day  Weight Used for Protein Requirements: Ideal  Protein (g/day): 120-155 gm pro/day  Fluid (ml/day): per MD    Nutrition Diagnosis:   Inadequate oral intake related to acute injury/trauma (recent surgery) as evidenced by NPO or clear liquid status due to medical condition (recorded PO intakes of % noted per chart review)    Nutrition Interventions:   Food and/or Nutrient Delivery: Continue Current Diet (Monitor need for oral supplements.)  Nutrition Education/Counseling: No recommendation at this time  Coordination of Nutrition Care: Continue to monitor while inpatient       Goals:  Previous Goal Met: No Progress toward Goal(s)  Goals: Meet at least 75% of estimated needs, prior to discharge       Nutrition Monitoring and Evaluation:   Behavioral-Environmental Outcomes: None Identified  Food/Nutrient Intake Outcomes: Diet Advancement/Tolerance  Physical Signs/Symptoms Outcomes: Biochemical Data, GI Status, Fluid Status or Edema, Nutrition Focused Physical Findings, Skin, Weight    Discharge Planning:     Too soon to determine     Adriana Bingham, [address], [place]  Contact: [phone]

## 2022-11-15 NOTE — PROGRESS NOTES
Orthopedic Progress Note    Patient:  Valery Wilhelm  YOB: 1980     38 y.o. male    Subjective:  Patient seen and examined  No complaints or concerns; reports similar pain levels as yesterday  No acute issues overnight; continues to have heaviness in bilateral upper extremity  Denies numbness or tingling, but has difficulty moving his thumbs bilaterally   Pain controlled  Denies fever, HA, CP, SOB, N/V, dysuria    Vitals reviewed, afebrile    Objective:   Vitals:    11/15/22 0400   BP: (!) 158/66   Pulse: 76   Resp: 13   Temp: 98 °F (36.7 °C)   SpO2: 95%     Gen: NAD, cooperative     Cardiovascular: regular rate, no dependent edema    Respiratory: No acute respiratory distress    LUE: Optifoam on with mild spotting. Curlex on LUE c/d/I. Entire extremity is swollen but has compressible compartments. No pain with passive extension of digits. AIN/radial/ulnar/median nerve motor intact. Limitations in thumb extension. Digits are warm and well perfused. Rad pulse 2+. RUE: Dressings in place c/d/I. Compartments swollen but compressible. No pain with passive extension of digits in the hand. Difficulty extending thumb. AIN/radial/ulnar/median nerve motor intact. Digits are warm and well perfused. Rad pulse 2+. LLE: Dressings intact. Mild saturation at the Optifoam. Wound vac in place. Adequate suction on. No pain with passive extension of digits. TA/EHL/FHL/GSC motor intact. Sensation present to light touch for entirety of left foot.     Recent Labs     11/14/22  0436   WBC 14.3*   HGB 7.6*   HCT 22.7*         K 3.9   BUN 16   CREATININE 0.80   GLUCOSE 100*      Meds: Clindamycin    See rec for complete list    Impression 38 y.o. male being seen for the following    - Left femur fracture s/p IMN, DOS 11/09  - Left open fibula fracture s/p I&D, DOS 11/09 & 11/11  - Left tibial plateau fracture  - Left clavicle fracture s/p ORIF, DOS 11/11  - Right radial shaft fracture s/p ORIF, DOS 11/11  - Left radial neck fracture  - Bilateral PIN palsy    Plan    - Plan for OR today for open reduction internal fixation of left tibial plateau with skin grafting vs. Primary closure  - Compartments to BUE remain soft  - NWB to LLE; AAT to BUE with 5lb restriction  - Encourage IS and mobilization with PT  - Maintain wound vac and dressings to LLE/RUE on   - NPO now  - Page ortho with any questions/concerns    Electronically signed by Karan Beach DO 5:57 AM 11/15/2022

## 2022-11-16 LAB
ABSOLUTE EOS #: 0 K/UL (ref 0–0.4)
ABSOLUTE IMMATURE GRANULOCYTE: 1.3 K/UL (ref 0–0.3)
ABSOLUTE LYMPH #: 3.31 K/UL (ref 1–4.8)
ABSOLUTE MONO #: 1.15 K/UL (ref 0.1–0.8)
ANION GAP SERPL CALCULATED.3IONS-SCNC: 8 MMOL/L (ref 9–17)
BASOPHILS # BLD: 0 % (ref 0–2)
BASOPHILS ABSOLUTE: 0 K/UL (ref 0–0.2)
BUN BLDV-MCNC: 20 MG/DL (ref 6–20)
CALCIUM SERPL-MCNC: 7.9 MG/DL (ref 8.6–10.4)
CHLORIDE BLD-SCNC: 99 MMOL/L (ref 98–107)
CO2: 26 MMOL/L (ref 20–31)
CREAT SERPL-MCNC: 0.79 MG/DL (ref 0.7–1.2)
EOSINOPHILS RELATIVE PERCENT: 0 % (ref 1–4)
GFR SERPL CREATININE-BSD FRML MDRD: >60 ML/MIN/1.73M2
GLUCOSE BLD-MCNC: 94 MG/DL (ref 70–99)
HCT VFR BLD CALC: 23.5 % (ref 40.7–50.3)
HEMOGLOBIN: 7.2 G/DL (ref 13–17)
IMMATURE GRANULOCYTES: 9 %
LYMPHOCYTES # BLD: 23 % (ref 24–44)
MAGNESIUM: 2.2 MG/DL (ref 1.6–2.6)
MCH RBC QN AUTO: 30.4 PG (ref 25.2–33.5)
MCHC RBC AUTO-ENTMCNC: 30.6 G/DL (ref 28.4–34.8)
MCV RBC AUTO: 99.2 FL (ref 82.6–102.9)
MONOCYTES # BLD: 8 % (ref 1–7)
MORPHOLOGY: ABNORMAL
MORPHOLOGY: ABNORMAL
NRBC AUTOMATED: 0.5 PER 100 WBC
PDW BLD-RTO: 14.6 % (ref 11.8–14.4)
PLATELET # BLD: 246 K/UL (ref 138–453)
PMV BLD AUTO: 10.5 FL (ref 8.1–13.5)
POTASSIUM SERPL-SCNC: 4.2 MMOL/L (ref 3.7–5.3)
RBC # BLD: 2.37 M/UL (ref 4.21–5.77)
SEG NEUTROPHILS: 60 % (ref 36–66)
SEGMENTED NEUTROPHILS ABSOLUTE COUNT: 8.64 K/UL (ref 1.8–7.7)
SODIUM BLD-SCNC: 133 MMOL/L (ref 135–144)
TOTAL CK: 7669 U/L (ref 39–308)
WBC # BLD: 14.4 K/UL (ref 3.5–11.3)

## 2022-11-16 PROCEDURE — 94660 CPAP INITIATION&MGMT: CPT

## 2022-11-16 PROCEDURE — 6360000002 HC RX W HCPCS: Performed by: STUDENT IN AN ORGANIZED HEALTH CARE EDUCATION/TRAINING PROGRAM

## 2022-11-16 PROCEDURE — 80048 BASIC METABOLIC PNL TOTAL CA: CPT

## 2022-11-16 PROCEDURE — 51702 INSERT TEMP BLADDER CATH: CPT

## 2022-11-16 PROCEDURE — 83735 ASSAY OF MAGNESIUM: CPT

## 2022-11-16 PROCEDURE — 94761 N-INVAS EAR/PLS OXIMETRY MLT: CPT

## 2022-11-16 PROCEDURE — 6370000000 HC RX 637 (ALT 250 FOR IP): Performed by: STUDENT IN AN ORGANIZED HEALTH CARE EDUCATION/TRAINING PROGRAM

## 2022-11-16 PROCEDURE — 2500000003 HC RX 250 WO HCPCS: Performed by: STUDENT IN AN ORGANIZED HEALTH CARE EDUCATION/TRAINING PROGRAM

## 2022-11-16 PROCEDURE — 36415 COLL VENOUS BLD VENIPUNCTURE: CPT

## 2022-11-16 PROCEDURE — 97530 THERAPEUTIC ACTIVITIES: CPT

## 2022-11-16 PROCEDURE — 2580000003 HC RX 258: Performed by: STUDENT IN AN ORGANIZED HEALTH CARE EDUCATION/TRAINING PROGRAM

## 2022-11-16 PROCEDURE — 85025 COMPLETE CBC W/AUTO DIFF WBC: CPT

## 2022-11-16 PROCEDURE — 82550 ASSAY OF CK (CPK): CPT

## 2022-11-16 PROCEDURE — 2060000000 HC ICU INTERMEDIATE R&B

## 2022-11-16 PROCEDURE — 97535 SELF CARE MNGMENT TRAINING: CPT

## 2022-11-16 RX ORDER — CLINDAMYCIN PHOSPHATE 600 MG/50ML
600 INJECTION INTRAVENOUS EVERY 8 HOURS
Status: COMPLETED | OUTPATIENT
Start: 2022-11-16 | End: 2022-11-17

## 2022-11-16 RX ADMIN — OXYCODONE 5 MG: 5 TABLET ORAL at 22:00

## 2022-11-16 RX ADMIN — METHOCARBAMOL TABLETS 750 MG: 750 TABLET, COATED ORAL at 17:41

## 2022-11-16 RX ADMIN — GABAPENTIN 300 MG: 300 CAPSULE ORAL at 14:06

## 2022-11-16 RX ADMIN — METHOCARBAMOL TABLETS 750 MG: 750 TABLET, COATED ORAL at 10:35

## 2022-11-16 RX ADMIN — ENOXAPARIN SODIUM 30 MG: 100 INJECTION SUBCUTANEOUS at 08:22

## 2022-11-16 RX ADMIN — ACETAMINOPHEN 1000 MG: 500 TABLET ORAL at 20:50

## 2022-11-16 RX ADMIN — OXYCODONE 5 MG: 5 TABLET ORAL at 01:45

## 2022-11-16 RX ADMIN — BACITRACIN: 500 OINTMENT TOPICAL at 14:06

## 2022-11-16 RX ADMIN — ACETAMINOPHEN 1000 MG: 500 TABLET ORAL at 06:22

## 2022-11-16 RX ADMIN — OXYCODONE 5 MG: 5 TABLET ORAL at 17:41

## 2022-11-16 RX ADMIN — OXYCODONE 5 MG: 5 TABLET ORAL at 14:05

## 2022-11-16 RX ADMIN — BACITRACIN: 500 OINTMENT TOPICAL at 08:21

## 2022-11-16 RX ADMIN — SODIUM CHLORIDE, POTASSIUM CHLORIDE, SODIUM LACTATE AND CALCIUM CHLORIDE: 600; 310; 30; 20 INJECTION, SOLUTION INTRAVENOUS at 20:54

## 2022-11-16 RX ADMIN — METHOCARBAMOL TABLETS 750 MG: 750 TABLET, COATED ORAL at 01:45

## 2022-11-16 RX ADMIN — GABAPENTIN 300 MG: 300 CAPSULE ORAL at 08:22

## 2022-11-16 RX ADMIN — ACETAMINOPHEN 1000 MG: 500 TABLET ORAL at 14:05

## 2022-11-16 RX ADMIN — CLINDAMYCIN PHOSPHATE 600 MG: 600 INJECTION, SOLUTION INTRAVENOUS at 01:47

## 2022-11-16 RX ADMIN — IBUPROFEN 400 MG: 400 TABLET, FILM COATED ORAL at 18:29

## 2022-11-16 RX ADMIN — IBUPROFEN 400 MG: 400 TABLET, FILM COATED ORAL at 20:52

## 2022-11-16 RX ADMIN — CLINDAMYCIN PHOSPHATE 600 MG: 600 INJECTION, SOLUTION INTRAVENOUS at 10:40

## 2022-11-16 RX ADMIN — IBUPROFEN 400 MG: 400 TABLET, FILM COATED ORAL at 14:05

## 2022-11-16 RX ADMIN — GABAPENTIN 300 MG: 300 CAPSULE ORAL at 20:51

## 2022-11-16 RX ADMIN — DOCUSATE SODIUM 50 MG AND SENNOSIDES 8.6 MG 1 TABLET: 8.6; 5 TABLET, FILM COATED ORAL at 08:22

## 2022-11-16 RX ADMIN — POLYETHYLENE GLYCOL 3350 17 G: 17 POWDER, FOR SOLUTION ORAL at 10:35

## 2022-11-16 RX ADMIN — IBUPROFEN 400 MG: 400 TABLET, FILM COATED ORAL at 01:45

## 2022-11-16 RX ADMIN — ENOXAPARIN SODIUM 30 MG: 100 INJECTION SUBCUTANEOUS at 20:51

## 2022-11-16 RX ADMIN — OXYCODONE 5 MG: 5 TABLET ORAL at 10:35

## 2022-11-16 RX ADMIN — IBUPROFEN 400 MG: 400 TABLET, FILM COATED ORAL at 10:35

## 2022-11-16 RX ADMIN — SODIUM CHLORIDE, POTASSIUM CHLORIDE, SODIUM LACTATE AND CALCIUM CHLORIDE: 600; 310; 30; 20 INJECTION, SOLUTION INTRAVENOUS at 01:48

## 2022-11-16 RX ADMIN — CLINDAMYCIN PHOSPHATE 600 MG: 600 INJECTION, SOLUTION INTRAVENOUS at 17:49

## 2022-11-16 RX ADMIN — BACITRACIN: 500 OINTMENT TOPICAL at 20:53

## 2022-11-16 RX ADMIN — IBUPROFEN 400 MG: 400 TABLET, FILM COATED ORAL at 06:22

## 2022-11-16 RX ADMIN — OXYCODONE 5 MG: 5 TABLET ORAL at 06:22

## 2022-11-16 ASSESSMENT — ENCOUNTER SYMPTOMS
ABDOMINAL PAIN: 0
BLURRED VISION: 0
DOUBLE VISION: 0
SHORTNESS OF BREATH: 0

## 2022-11-16 ASSESSMENT — PAIN SCALES - WONG BAKER
WONGBAKER_NUMERICALRESPONSE: 0

## 2022-11-16 ASSESSMENT — PAIN SCALES - GENERAL
PAINLEVEL_OUTOF10: 9
PAINLEVEL_OUTOF10: 7
PAINLEVEL_OUTOF10: 8
PAINLEVEL_OUTOF10: 9

## 2022-11-16 ASSESSMENT — PAIN DESCRIPTION - LOCATION
LOCATION: LEG;ARM
LOCATION: LEG;HIP
LOCATION: ARM;LEG

## 2022-11-16 ASSESSMENT — PAIN DESCRIPTION - ORIENTATION: ORIENTATION: LEFT

## 2022-11-16 ASSESSMENT — PAIN DESCRIPTION - DESCRIPTORS: DESCRIPTORS: DISCOMFORT
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
## 2022-11-16 NOTE — PLAN OF CARE
Problem: Discharge Planning  Goal: Discharge to home or other facility with appropriate resources  11/16/2022 0314 by Lorie Matias RN  Outcome: Progressing  11/15/2022 1450 by Lizzy Scott RN  Outcome: Progressing     Problem: Pain  Goal: Verbalizes/displays adequate comfort level or baseline comfort level  11/16/2022 0314 by Lorie Matias RN  Outcome: Progressing  11/15/2022 1450 by Lizzy Scott RN  Outcome: Progressing     Problem: Safety - Adult  Goal: Free from fall injury  11/16/2022 0314 by Lorie Matias RN  Outcome: Progressing  11/15/2022 1450 by Lizzy Scott RN  Outcome: Progressing     Problem: Skin/Tissue Integrity  Goal: Absence of new skin breakdown  Description: 1. Monitor for areas of redness and/or skin breakdown  2. Assess vascular access sites hourly  3. Every 4-6 hours minimum:  Change oxygen saturation probe site  4. Every 4-6 hours:  If on nasal continuous positive airway pressure, respiratory therapy assess nares and determine need for appliance change or resting period. 11/16/2022 0314 by Lorie Matias RN  Outcome: Progressing  11/15/2022 1450 by Lizzy Scott RN  Outcome: Progressing     Problem: Respiratory - Adult  Goal: Achieves optimal ventilation and oxygenation  11/16/2022 0314 by Lorie Matias RN  Outcome: Progressing  11/15/2022 1450 by Lizzy Scott RN  Outcome: Progressing     Problem: ABCDS Injury Assessment  Goal: Absence of physical injury  11/16/2022 0314 by Lorie Matias RN  Outcome: Progressing  11/15/2022 1450 by Lizzy Scott RN  Outcome: Progressing     Problem: Safety - Medical Restraint  Goal: Remains free of injury from restraints (Restraint for Interference with Medical Device)  Description: INTERVENTIONS:  1. Determine that other, less restrictive measures have been tried or would not be effective before applying the restraint  2. Evaluate the patient's condition at the time of restraint application  3.  Inform patient/family regarding the reason for restraint  4.  Q2H: Monitor safety, psychosocial status, comfort, nutrition and hydration  11/16/2022 0314 by Jeremy Dubon RN  Outcome: Progressing  11/15/2022 1450 by Hussein Tam RN  Outcome: Progressing     Problem: Nutrition Deficit:  Goal: Optimize nutritional status  11/16/2022 0314 by Jeremy Dubon RN  Outcome: Progressing  11/15/2022 1450 by Hussein Tam RN  Outcome: Progressing  11/15/2022 1345 by Luciano Narayanan RD, LD  Flowsheets (Taken 11/15/2022 1336)  Nutrient intake appropriate for improving, restoring, or maintaining nutritional needs:   Assess nutritional status and recommend course of action   Monitor oral intake, labs, and treatment plans   Recommend appropriate diets, oral nutritional supplements, and vitamin/mineral supplements

## 2022-11-16 NOTE — PLAN OF CARE
Problem: Discharge Planning  Goal: Discharge to home or other facility with appropriate resources  11/16/2022 1025 by Tristen Taylor RN  Outcome: Progressing     Problem: Pain  Goal: Verbalizes/displays adequate comfort level or baseline comfort level  11/16/2022 1025 by Tristen Taylor RN  Outcome: Progressing     Problem: Safety - Adult  Goal: Free from fall injury  11/16/2022 1025 by Tristen Taylor RN  Outcome: Progressing     Problem: Skin/Tissue Integrity  Goal: Absence of new skin breakdown  Description: 1. Monitor for areas of redness and/or skin breakdown  2. Assess vascular access sites hourly  3. Every 4-6 hours minimum:  Change oxygen saturation probe site  4. Every 4-6 hours:  If on nasal continuous positive airway pressure, respiratory therapy assess nares and determine need for appliance change or resting period.   11/16/2022 1025 by Tristen Taylor RN  Outcome: Progressing

## 2022-11-16 NOTE — PROGRESS NOTES
Orthopedic Progress Note    Patient:  Braeden Villagran  YOB: 1980     38 y.o. male    Subjective:  Patient seen and examined  No complaints or concerns; reports decreased pain levels, pain controlled with current regimen  No acute issues overnight; continues to have heaviness in bilateral upper extremity  Denies numbness or tingling, but has difficulty moving his thumbs bilaterally   Denies fever, HA, CP, SOB, N/V, dysuria    Vitals reviewed, afebrile    Objective:   Vitals:    11/16/22 0622   BP:    Pulse:    Resp: 21   Temp:    SpO2:      Gen: NAD, cooperative     Cardiovascular: regular rate, no dependent edema    Respiratory: No acute respiratory distress    LUE: Optifoam on with mild spotting. Curlex on LUE c/d/I. Entire extremity is swollen but compressible compartments. No pain with passive extension of digits. AIN/radial/ulnar/median nerve motor intact. Limitations in thumb extension along PIN. Median/Radial/Ulnar nerve SILT though diminished. Digits are warm and well perfused. Rad pulse 2+. RUE: Dressings in place c/d/I. Compartments swollen but compressible. No pain with passive extension of digits in the hand. Difficulty extending thumb along PIN. Median/Radial/Ulnar/AIN/PIN motor intact. Median/Radial/Ulnar nerve SILT yet diminished. Digits are warm and well perfused. Rad pulse 2+. LLE: Dressings/ACE intact. Mild saturation at the Optifoam. Wound vac in place with adequate suction and scant output since placement. No pain with passive extension of digits. TA/EHL/FHL/GSC motor intact. Sensation present to light touch for entirety of left foot. Exposed digits warm and well-perfused.     Recent Labs     11/15/22  1435   WBC 17.0*   HGB 7.7*   HCT 23.5*         K 5.1   BUN 15   CREATININE 0.71   GLUCOSE 117*      Meds: Clindamycin    See rec for complete list    Impression 38 y.o. male being seen for the following    - Left femur fracture s/p IMN, DOS 11/09  - Left open fibula fracture s/p I&D, DOS 11/09 & 11/11 & 11/16 (with xenograft)  - Left tibial plateau fracture s/p ORIF, POD#1  - Left clavicle fracture s/p ORIF, DOS 11/11  - Right radial shaft fracture s/p ORIF, DOS 11/11  - Left radial neck fracture  - Bilateral PIN palsy    Plan    - No plans for further orthopedic intervention  - Tentative plan to remove [facility] 11/22, OK to DC with WC  - Compartments to BUE remain soft  - NWB to LLE; AAT to BUE with 5lb restriction  - Encourage IS and mobilization with PT  - Maintain wound vac and dressings to LLE/RUE on   - Adult diet OK from orthopedic perspective  - Page ortho with any questions/concerns  - OK for discharge from orthopedic perspective  - F/u Dr. Michael Valencia in clinic 7 days after most recent surgery    Electronically signed by Rabia Simeon DO 6:31 AM 11/16/2022
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
## 2022-11-17 LAB
ABSOLUTE EOS #: 0.51 K/UL (ref 0–0.4)
ABSOLUTE IMMATURE GRANULOCYTE: 1.7 K/UL (ref 0–0.3)
ABSOLUTE LYMPH #: 1.87 K/UL (ref 1–4.8)
ABSOLUTE MONO #: 1.36 K/UL (ref 0.1–0.8)
ANION GAP SERPL CALCULATED.3IONS-SCNC: 11 MMOL/L (ref 9–17)
BASOPHILS # BLD: 0 % (ref 0–2)
BASOPHILS ABSOLUTE: 0 K/UL (ref 0–0.2)
BUN BLDV-MCNC: 16 MG/DL (ref 6–20)
CALCIUM SERPL-MCNC: 7.9 MG/DL (ref 8.6–10.4)
CHLORIDE BLD-SCNC: 103 MMOL/L (ref 98–107)
CO2: 24 MMOL/L (ref 20–31)
CREAT SERPL-MCNC: 0.66 MG/DL (ref 0.7–1.2)
EOSINOPHILS RELATIVE PERCENT: 3 % (ref 1–4)
GFR SERPL CREATININE-BSD FRML MDRD: >60 ML/MIN/1.73M2
GLUCOSE BLD-MCNC: 91 MG/DL (ref 70–99)
HCT VFR BLD CALC: 25.2 % (ref 40.7–50.3)
HEMOGLOBIN: 7.9 G/DL (ref 13–17)
IMMATURE GRANULOCYTES: 10 %
LYMPHOCYTES # BLD: 11 % (ref 24–44)
MAGNESIUM: 1.9 MG/DL (ref 1.6–2.6)
MCH RBC QN AUTO: 30.9 PG (ref 25.2–33.5)
MCHC RBC AUTO-ENTMCNC: 31.3 G/DL (ref 28.4–34.8)
MCV RBC AUTO: 98.4 FL (ref 82.6–102.9)
MONOCYTES # BLD: 8 % (ref 1–7)
MORPHOLOGY: ABNORMAL
MORPHOLOGY: ABNORMAL
NRBC AUTOMATED: 0.4 PER 100 WBC
PDW BLD-RTO: 14.5 % (ref 11.8–14.4)
PLATELET # BLD: 305 K/UL (ref 138–453)
PMV BLD AUTO: 10.3 FL (ref 8.1–13.5)
POTASSIUM SERPL-SCNC: 4.4 MMOL/L (ref 3.7–5.3)
RBC # BLD: 2.56 M/UL (ref 4.21–5.77)
SEG NEUTROPHILS: 68 % (ref 36–66)
SEGMENTED NEUTROPHILS ABSOLUTE COUNT: 11.56 K/UL (ref 1.8–7.7)
SODIUM BLD-SCNC: 138 MMOL/L (ref 135–144)
TOTAL CK: 6376 U/L (ref 39–308)
WBC # BLD: 17 K/UL (ref 3.5–11.3)

## 2022-11-17 PROCEDURE — 94761 N-INVAS EAR/PLS OXIMETRY MLT: CPT

## 2022-11-17 PROCEDURE — 6370000000 HC RX 637 (ALT 250 FOR IP): Performed by: STUDENT IN AN ORGANIZED HEALTH CARE EDUCATION/TRAINING PROGRAM

## 2022-11-17 PROCEDURE — 97530 THERAPEUTIC ACTIVITIES: CPT

## 2022-11-17 PROCEDURE — 6360000002 HC RX W HCPCS: Performed by: STUDENT IN AN ORGANIZED HEALTH CARE EDUCATION/TRAINING PROGRAM

## 2022-11-17 PROCEDURE — 94660 CPAP INITIATION&MGMT: CPT

## 2022-11-17 PROCEDURE — 82550 ASSAY OF CK (CPK): CPT

## 2022-11-17 PROCEDURE — 2060000000 HC ICU INTERMEDIATE R&B

## 2022-11-17 PROCEDURE — 83735 ASSAY OF MAGNESIUM: CPT

## 2022-11-17 PROCEDURE — 97110 THERAPEUTIC EXERCISES: CPT

## 2022-11-17 PROCEDURE — 36415 COLL VENOUS BLD VENIPUNCTURE: CPT

## 2022-11-17 PROCEDURE — 2700000000 HC OXYGEN THERAPY PER DAY

## 2022-11-17 PROCEDURE — 80048 BASIC METABOLIC PNL TOTAL CA: CPT

## 2022-11-17 PROCEDURE — 2500000003 HC RX 250 WO HCPCS: Performed by: STUDENT IN AN ORGANIZED HEALTH CARE EDUCATION/TRAINING PROGRAM

## 2022-11-17 PROCEDURE — 85025 COMPLETE CBC W/AUTO DIFF WBC: CPT

## 2022-11-17 RX ORDER — OXYCODONE HYDROCHLORIDE 5 MG/1
5 TABLET ORAL EVERY 4 HOURS PRN
Status: DISCONTINUED | OUTPATIENT
Start: 2022-11-17 | End: 2022-11-20

## 2022-11-17 RX ADMIN — CLINDAMYCIN PHOSPHATE 600 MG: 600 INJECTION, SOLUTION INTRAVENOUS at 09:28

## 2022-11-17 RX ADMIN — OXYCODONE 5 MG: 5 TABLET ORAL at 02:05

## 2022-11-17 RX ADMIN — METHOCARBAMOL TABLETS 750 MG: 750 TABLET, COATED ORAL at 18:20

## 2022-11-17 RX ADMIN — METHOCARBAMOL TABLETS 750 MG: 750 TABLET, COATED ORAL at 09:26

## 2022-11-17 RX ADMIN — OXYCODONE 5 MG: 5 TABLET ORAL at 18:21

## 2022-11-17 RX ADMIN — ENOXAPARIN SODIUM 30 MG: 100 INJECTION SUBCUTANEOUS at 09:25

## 2022-11-17 RX ADMIN — ACETAMINOPHEN 1000 MG: 500 TABLET ORAL at 13:47

## 2022-11-17 RX ADMIN — OXYCODONE 5 MG: 5 TABLET ORAL at 05:51

## 2022-11-17 RX ADMIN — METHOCARBAMOL TABLETS 750 MG: 750 TABLET, COATED ORAL at 02:05

## 2022-11-17 RX ADMIN — POLYETHYLENE GLYCOL 3350 17 G: 17 POWDER, FOR SOLUTION ORAL at 09:25

## 2022-11-17 RX ADMIN — ACETAMINOPHEN 1000 MG: 500 TABLET ORAL at 05:50

## 2022-11-17 RX ADMIN — GABAPENTIN 300 MG: 300 CAPSULE ORAL at 09:26

## 2022-11-17 RX ADMIN — ACETAMINOPHEN 1000 MG: 500 TABLET ORAL at 22:30

## 2022-11-17 RX ADMIN — OXYCODONE 5 MG: 5 TABLET ORAL at 13:47

## 2022-11-17 RX ADMIN — OXYCODONE 5 MG: 5 TABLET ORAL at 09:26

## 2022-11-17 RX ADMIN — BACITRACIN: 500 OINTMENT TOPICAL at 20:54

## 2022-11-17 RX ADMIN — DOCUSATE SODIUM 50 MG AND SENNOSIDES 8.6 MG 1 TABLET: 8.6; 5 TABLET, FILM COATED ORAL at 20:32

## 2022-11-17 RX ADMIN — CLINDAMYCIN PHOSPHATE 600 MG: 600 INJECTION, SOLUTION INTRAVENOUS at 02:06

## 2022-11-17 RX ADMIN — GABAPENTIN 300 MG: 300 CAPSULE ORAL at 13:47

## 2022-11-17 RX ADMIN — BACITRACIN: 500 OINTMENT TOPICAL at 09:27

## 2022-11-17 RX ADMIN — OXYCODONE 5 MG: 5 TABLET ORAL at 22:30

## 2022-11-17 RX ADMIN — DOCUSATE SODIUM 50 MG AND SENNOSIDES 8.6 MG 1 TABLET: 8.6; 5 TABLET, FILM COATED ORAL at 09:27

## 2022-11-17 RX ADMIN — ENOXAPARIN SODIUM 30 MG: 100 INJECTION SUBCUTANEOUS at 20:32

## 2022-11-17 RX ADMIN — GABAPENTIN 300 MG: 300 CAPSULE ORAL at 20:32

## 2022-11-17 ASSESSMENT — PAIN SCALES - WONG BAKER

## 2022-11-17 ASSESSMENT — PAIN SCALES - GENERAL
PAINLEVEL_OUTOF10: 8
PAINLEVEL_OUTOF10: 7
PAINLEVEL_OUTOF10: 9

## 2022-11-17 ASSESSMENT — PAIN DESCRIPTION - ORIENTATION
ORIENTATION: RIGHT;LEFT;POSTERIOR;ANTERIOR
ORIENTATION: LEFT;POSTERIOR
ORIENTATION: LEFT

## 2022-11-17 ASSESSMENT — PAIN - FUNCTIONAL ASSESSMENT
PAIN_FUNCTIONAL_ASSESSMENT: PREVENTS OR INTERFERES SOME ACTIVE ACTIVITIES AND ADLS

## 2022-11-17 ASSESSMENT — PAIN DESCRIPTION - DESCRIPTORS
DESCRIPTORS: ACHING

## 2022-11-17 ASSESSMENT — PAIN DESCRIPTION - LOCATION
LOCATION: GENERALIZED
LOCATION: GENERALIZED;LEG;SHOULDER
LOCATION: GENERALIZED
LOCATION: GENERALIZED
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
## 2022-11-17 NOTE — PROGRESS NOTES
Orthopedic Progress Note    Patient:  Jessica Chávez  YOB: 1980     38 y.o. male    Subjective:  Patient seen and examined this morning. No complaints or concerns. No issues overnight per nursing. Pain is currently controlled on current regimen, states a 6-7/10. Unable to utilize bilateral upper extremities to use call lights or bed positioning buttons. Denies fever, HA, CP, SOB, N/V, dysuria, new numbness/tingling. No BM/flatus. Was able to sit to standwith PT yesterday with max assist.    Vitals reviewed, afebrile    Objective:   Vitals:    11/17/22 0031   BP: (!) 159/78   Pulse:    Resp:    Temp: 98.1 °F (36.7 °C)   SpO2:      Gen: NAD, cooperative      Cardiovascular: Regular rate     Respiratory: No acute respiratory distress. Currently on BiPAP    LUE: ACE bandage on LUE c/d/I. Entire extremity is swollen but compressible compartments. Mild erythema to upper arm that is exposed has improved. No pain with passive extension of digits. Able to flex and extend fingers and wrist. Difficult to assess ulnar motor function. Limitations in thumb extension along PIN. Median/Radial/Ulnar nerve SILT though diminished. Digits are warm and well perfused. Rad pulse 2+. RUE: ACE bandage on. Dressings in place c/d/I. Compartments swollen but compressible. No pain with passive extension of digits in the hand. Difficulty extending thumb along PIN, worse on right than left. Able to flex and extend fingers and wrist. Difficult to assess ulnar motor function. Median/Radial/Ulnar nerve SILT yet diminished. Digits are warm and well perfused. Rad pulse 2+. LLE: Dressings/ACE intact, C/D/I. Wound vac in place with adequate suction and minimal output since placement, approximately 100ml to now. No pain with passive extension of digits. Able to flex/extend digits. Unable to plantarflex/dorsiflex ankle. Sensation present to light touch for entirety of left foot.  Exposed digits warm and well-perfused. Recent Labs     11/16/22  0832   WBC 14.4*   HGB 7.2*   HCT 23.5*      *   K 4.2   BUN 20   CREATININE 0.79   GLUCOSE 94      Meds:    Abx: Clindamycin  DVT ppx: Lovenox  See rec for complete list    Impression 38 y.o. male being seen for the following     - Left femur fracture s/p IMN, DOS 11/09  - Left open fibula fracture s/p I&D, DOS 11/09 & 11/11 & 11/15 (with xenograft)  - Left tibial plateau fracture s/p ORIF, DOS 11/15  - Left clavicle fracture s/p ORIF, DOS 11/11  - Right radial shaft fracture s/p ORIF, DOS 11/11  - Left radial neck fracture  - Bilateral PIN palsy     Plan   - No plans for further orthopedic intervention at this time  - Maintain wound vac and dressings to LLE/RUE on. Please notify ortho if malfunctioning or full. Please record output each shift  - Tentative plan to remove [place] 11/22, OK to DC with WC  - Compartments to BUE remain soft  - NWB to LLE; AAT to BUE with 5 lb restriction  - Encourage IS and mobilization with PT  - Adult diet OK from orthopedic perspective  - [phone] [address] Lemos for [facility] from orthopedic perspective  - OK for discharge from orthopedic perspective  - F/u Dr. Magali Ramirez in clinic 7 days after most recent surgery  - [facility] on call with any questions/concerns    Sybil Mckee DO  Orthopedic Surgery Resident, PGY-1  [place] [address], [place]       PGY-2 Addendum    Patient seen and examined. Agree with above assessment by Dr. Oly Mckee. Patient is POD#2 from most recent surgery of left tibial plateau ORIF, LLE I&D and grafting. Wound vac on. Compartments soft and compressible. Wound vac is maintaining suction. Dressings are all c/d/I. Ok to discharge from orthopedic perspective once medically stable and clear with PT/OT. Keep wound vac on discharge. F/u with Dr. Magali Ramirez in 7 days from most recent surgery.       Heide Diego DO  Orthopedic Surgery Resident, PGY-2  [address]
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
## 2022-11-18 LAB
ABSOLUTE EOS #: 1.07 K/UL (ref 0–0.4)
ABSOLUTE IMMATURE GRANULOCYTE: 1.5 K/UL (ref 0–0.3)
ABSOLUTE LYMPH #: 1.28 K/UL (ref 1–4.8)
ABSOLUTE MONO #: 0.86 K/UL (ref 0.1–0.8)
ANION GAP SERPL CALCULATED.3IONS-SCNC: 10 MMOL/L (ref 9–17)
BASOPHILS # BLD: 0 % (ref 0–2)
BASOPHILS ABSOLUTE: 0 K/UL (ref 0–0.2)
BUN BLDV-MCNC: 14 MG/DL (ref 6–20)
CALCIUM SERPL-MCNC: 8.2 MG/DL (ref 8.6–10.4)
CHLORIDE BLD-SCNC: 101 MMOL/L (ref 98–107)
CO2: 23 MMOL/L (ref 20–31)
CREAT SERPL-MCNC: 0.63 MG/DL (ref 0.7–1.2)
EOSINOPHILS RELATIVE PERCENT: 5 % (ref 1–4)
GFR SERPL CREATININE-BSD FRML MDRD: >60 ML/MIN/1.73M2
GLUCOSE BLD-MCNC: 103 MG/DL (ref 70–99)
HCT VFR BLD CALC: 26.7 % (ref 40.7–50.3)
HEMOGLOBIN: 8.2 G/DL (ref 13–17)
IMMATURE GRANULOCYTES: 7 %
LYMPHOCYTES # BLD: 6 % (ref 24–44)
MCH RBC QN AUTO: 30.4 PG (ref 25.2–33.5)
MCHC RBC AUTO-ENTMCNC: 30.7 G/DL (ref 28.4–34.8)
MCV RBC AUTO: 98.9 FL (ref 82.6–102.9)
MONOCYTES # BLD: 4 % (ref 1–7)
MORPHOLOGY: ABNORMAL
NRBC AUTOMATED: 0.2 PER 100 WBC
PDW BLD-RTO: 14.1 % (ref 11.8–14.4)
PLATELET # BLD: 337 K/UL (ref 138–453)
PMV BLD AUTO: 10 FL (ref 8.1–13.5)
POTASSIUM SERPL-SCNC: 4 MMOL/L (ref 3.7–5.3)
RBC # BLD: 2.7 M/UL (ref 4.21–5.77)
SEG NEUTROPHILS: 78 % (ref 36–66)
SEGMENTED NEUTROPHILS ABSOLUTE COUNT: 16.69 K/UL (ref 1.8–7.7)
SODIUM BLD-SCNC: 134 MMOL/L (ref 135–144)
WBC # BLD: 21.4 K/UL (ref 3.5–11.3)

## 2022-11-18 PROCEDURE — 2060000000 HC ICU INTERMEDIATE R&B

## 2022-11-18 PROCEDURE — 6370000000 HC RX 637 (ALT 250 FOR IP): Performed by: STUDENT IN AN ORGANIZED HEALTH CARE EDUCATION/TRAINING PROGRAM

## 2022-11-18 PROCEDURE — 36415 COLL VENOUS BLD VENIPUNCTURE: CPT

## 2022-11-18 PROCEDURE — 6360000002 HC RX W HCPCS: Performed by: STUDENT IN AN ORGANIZED HEALTH CARE EDUCATION/TRAINING PROGRAM

## 2022-11-18 PROCEDURE — 85025 COMPLETE CBC W/AUTO DIFF WBC: CPT

## 2022-11-18 PROCEDURE — 94660 CPAP INITIATION&MGMT: CPT

## 2022-11-18 PROCEDURE — 97535 SELF CARE MNGMENT TRAINING: CPT

## 2022-11-18 PROCEDURE — 97542 WHEELCHAIR MNGMENT TRAINING: CPT

## 2022-11-18 PROCEDURE — 2580000003 HC RX 258: Performed by: STUDENT IN AN ORGANIZED HEALTH CARE EDUCATION/TRAINING PROGRAM

## 2022-11-18 PROCEDURE — 97110 THERAPEUTIC EXERCISES: CPT

## 2022-11-18 PROCEDURE — 80048 BASIC METABOLIC PNL TOTAL CA: CPT

## 2022-11-18 PROCEDURE — 97530 THERAPEUTIC ACTIVITIES: CPT

## 2022-11-18 RX ADMIN — GABAPENTIN 300 MG: 300 CAPSULE ORAL at 14:42

## 2022-11-18 RX ADMIN — DOCUSATE SODIUM 50 MG AND SENNOSIDES 8.6 MG 1 TABLET: 8.6; 5 TABLET, FILM COATED ORAL at 21:26

## 2022-11-18 RX ADMIN — GABAPENTIN 300 MG: 300 CAPSULE ORAL at 21:26

## 2022-11-18 RX ADMIN — ACETAMINOPHEN 1000 MG: 500 TABLET ORAL at 05:55

## 2022-11-18 RX ADMIN — ACETAMINOPHEN 1000 MG: 500 TABLET ORAL at 21:26

## 2022-11-18 RX ADMIN — DOCUSATE SODIUM 50 MG AND SENNOSIDES 8.6 MG 1 TABLET: 8.6; 5 TABLET, FILM COATED ORAL at 08:51

## 2022-11-18 RX ADMIN — OXYCODONE 5 MG: 5 TABLET ORAL at 14:43

## 2022-11-18 RX ADMIN — BACITRACIN: 500 OINTMENT TOPICAL at 21:25

## 2022-11-18 RX ADMIN — ACETAMINOPHEN 1000 MG: 500 TABLET ORAL at 14:43

## 2022-11-18 RX ADMIN — OXYCODONE 5 MG: 5 TABLET ORAL at 03:40

## 2022-11-18 RX ADMIN — BACITRACIN: 500 OINTMENT TOPICAL at 08:51

## 2022-11-18 RX ADMIN — GABAPENTIN 300 MG: 300 CAPSULE ORAL at 08:51

## 2022-11-18 RX ADMIN — METHOCARBAMOL TABLETS 750 MG: 750 TABLET, COATED ORAL at 03:40

## 2022-11-18 RX ADMIN — ENOXAPARIN SODIUM 30 MG: 100 INJECTION SUBCUTANEOUS at 21:26

## 2022-11-18 RX ADMIN — METHOCARBAMOL TABLETS 750 MG: 750 TABLET, COATED ORAL at 08:52

## 2022-11-18 RX ADMIN — OXYCODONE 5 MG: 5 TABLET ORAL at 23:00

## 2022-11-18 RX ADMIN — METHOCARBAMOL TABLETS 750 MG: 750 TABLET, COATED ORAL at 18:28

## 2022-11-18 RX ADMIN — OXYCODONE 5 MG: 5 TABLET ORAL at 08:50

## 2022-11-18 RX ADMIN — SODIUM CHLORIDE, PRESERVATIVE FREE 10 ML: 5 INJECTION INTRAVENOUS at 21:26

## 2022-11-18 RX ADMIN — OXYCODONE 5 MG: 5 TABLET ORAL at 18:28

## 2022-11-18 RX ADMIN — ENOXAPARIN SODIUM 30 MG: 100 INJECTION SUBCUTANEOUS at 08:51

## 2022-11-18 ASSESSMENT — PAIN SCALES - WONG BAKER

## 2022-11-18 ASSESSMENT — PAIN DESCRIPTION - DESCRIPTORS
DESCRIPTORS: ACHING

## 2022-11-18 ASSESSMENT — PAIN - FUNCTIONAL ASSESSMENT
PAIN_FUNCTIONAL_ASSESSMENT: PREVENTS OR INTERFERES WITH MANY ACTIVE NOT PASSIVE ACTIVITIES
PAIN_FUNCTIONAL_ASSESSMENT: PREVENTS OR INTERFERES SOME ACTIVE ACTIVITIES AND ADLS

## 2022-11-18 ASSESSMENT — PAIN DESCRIPTION - ORIENTATION
ORIENTATION: LEFT

## 2022-11-18 ASSESSMENT — PAIN SCALES - GENERAL
PAINLEVEL_OUTOF10: 9
PAINLEVEL_OUTOF10: 7
PAINLEVEL_OUTOF10: 8
PAINLEVEL_OUTOF10: 10

## 2022-11-18 ASSESSMENT — PAIN DESCRIPTION - LOCATION
LOCATION: GENERALIZED;LEG
LOCATION: LEG
LOCATION: GENERALIZED;LEG
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
## 2022-11-18 NOTE — CARE COORDINATION
Transitional planning: Received call from Jose Roberto Catherine at [facility], phone number [phone], Ext [phone]. She received the C-9, but she needs to know what facility can accept the pt before proceeding. It expires in 72 hours. Tala Silvestre-07-A 1498 at [phone], spoke with Oscar Mckeon. States they will not have transport for him. REY is requesting clarification of how facility will be reimbursed. [facility] at [phone] to speak with admissions. Earl Guerra had , left message to call [facility] back. Gunner Fry at [facility], spoke with Jl in admissions. She states she will call Jose Roberto Catherine at [facility] about insurance needs. Given [facility] call back number. 1512 Received call from Sybil at [facility], she states she never received referral. Clarified fax number and she said same. She requested resend. Resent at [phone].
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
## 2022-11-19 LAB
ABSOLUTE EOS #: 0 K/UL (ref 0–0.4)
ABSOLUTE IMMATURE GRANULOCYTE: 1.63 K/UL (ref 0–0.3)
ABSOLUTE LYMPH #: 2.8 K/UL (ref 1–4.8)
ABSOLUTE MONO #: 0.93 K/UL (ref 0.1–0.8)
ANION GAP SERPL CALCULATED.3IONS-SCNC: 12 MMOL/L (ref 9–17)
BASOPHILS # BLD: 0 % (ref 0–2)
BASOPHILS ABSOLUTE: 0 K/UL (ref 0–0.2)
BUN BLDV-MCNC: 14 MG/DL (ref 6–20)
CALCIUM SERPL-MCNC: 8.4 MG/DL (ref 8.6–10.4)
CHLORIDE BLD-SCNC: 102 MMOL/L (ref 98–107)
CO2: 22 MMOL/L (ref 20–31)
CREAT SERPL-MCNC: 0.61 MG/DL (ref 0.7–1.2)
EOSINOPHILS RELATIVE PERCENT: 0 % (ref 1–4)
GFR SERPL CREATININE-BSD FRML MDRD: >60 ML/MIN/1.73M2
GLUCOSE BLD-MCNC: 111 MG/DL (ref 70–99)
HCT VFR BLD CALC: 26.9 % (ref 40.7–50.3)
HEMOGLOBIN: 8.3 G/DL (ref 13–17)
IMMATURE GRANULOCYTES: 7 %
LYMPHOCYTES # BLD: 12 % (ref 24–44)
MCH RBC QN AUTO: 30.5 PG (ref 25.2–33.5)
MCHC RBC AUTO-ENTMCNC: 30.9 G/DL (ref 28.4–34.8)
MCV RBC AUTO: 98.9 FL (ref 82.6–102.9)
MONOCYTES # BLD: 4 % (ref 1–7)
MORPHOLOGY: ABNORMAL
MORPHOLOGY: ABNORMAL
NRBC AUTOMATED: 0.2 PER 100 WBC
PDW BLD-RTO: 14.5 % (ref 11.8–14.4)
PLATELET # BLD: 323 K/UL (ref 138–453)
PMV BLD AUTO: 10.2 FL (ref 8.1–13.5)
POTASSIUM SERPL-SCNC: 4.2 MMOL/L (ref 3.7–5.3)
RBC # BLD: 2.72 M/UL (ref 4.21–5.77)
SEG NEUTROPHILS: 77 % (ref 36–66)
SEGMENTED NEUTROPHILS ABSOLUTE COUNT: 17.94 K/UL (ref 1.8–7.7)
SODIUM BLD-SCNC: 136 MMOL/L (ref 135–144)
WBC # BLD: 23.3 K/UL (ref 3.5–11.3)

## 2022-11-19 PROCEDURE — 6370000000 HC RX 637 (ALT 250 FOR IP): Performed by: STUDENT IN AN ORGANIZED HEALTH CARE EDUCATION/TRAINING PROGRAM

## 2022-11-19 PROCEDURE — 2060000000 HC ICU INTERMEDIATE R&B

## 2022-11-19 PROCEDURE — 85025 COMPLETE CBC W/AUTO DIFF WBC: CPT

## 2022-11-19 PROCEDURE — 80048 BASIC METABOLIC PNL TOTAL CA: CPT

## 2022-11-19 PROCEDURE — 2580000003 HC RX 258: Performed by: STUDENT IN AN ORGANIZED HEALTH CARE EDUCATION/TRAINING PROGRAM

## 2022-11-19 PROCEDURE — 6360000002 HC RX W HCPCS: Performed by: STUDENT IN AN ORGANIZED HEALTH CARE EDUCATION/TRAINING PROGRAM

## 2022-11-19 PROCEDURE — 36415 COLL VENOUS BLD VENIPUNCTURE: CPT

## 2022-11-19 PROCEDURE — 6360000002 HC RX W HCPCS

## 2022-11-19 RX ORDER — KETOROLAC TROMETHAMINE 15 MG/ML
15 INJECTION, SOLUTION INTRAMUSCULAR; INTRAVENOUS EVERY 6 HOURS PRN
Status: DISPENSED | OUTPATIENT
Start: 2022-11-19 | End: 2022-11-21

## 2022-11-19 RX ADMIN — BACITRACIN: 500 OINTMENT TOPICAL at 14:19

## 2022-11-19 RX ADMIN — OXYCODONE 5 MG: 5 TABLET ORAL at 17:33

## 2022-11-19 RX ADMIN — ENOXAPARIN SODIUM 30 MG: 100 INJECTION SUBCUTANEOUS at 09:36

## 2022-11-19 RX ADMIN — GABAPENTIN 300 MG: 300 CAPSULE ORAL at 09:36

## 2022-11-19 RX ADMIN — BACITRACIN: 500 OINTMENT TOPICAL at 20:36

## 2022-11-19 RX ADMIN — GABAPENTIN 300 MG: 300 CAPSULE ORAL at 14:18

## 2022-11-19 RX ADMIN — KETOROLAC TROMETHAMINE 15 MG: 15 INJECTION, SOLUTION INTRAMUSCULAR; INTRAVENOUS at 21:25

## 2022-11-19 RX ADMIN — DOCUSATE SODIUM 50 MG AND SENNOSIDES 8.6 MG 1 TABLET: 8.6; 5 TABLET, FILM COATED ORAL at 09:36

## 2022-11-19 RX ADMIN — GABAPENTIN 300 MG: 300 CAPSULE ORAL at 20:35

## 2022-11-19 RX ADMIN — OXYCODONE 5 MG: 5 TABLET ORAL at 21:25

## 2022-11-19 RX ADMIN — SODIUM CHLORIDE, PRESERVATIVE FREE 10 ML: 5 INJECTION INTRAVENOUS at 20:36

## 2022-11-19 RX ADMIN — BACITRACIN: 500 OINTMENT TOPICAL at 09:36

## 2022-11-19 RX ADMIN — METHOCARBAMOL TABLETS 750 MG: 750 TABLET, COATED ORAL at 03:00

## 2022-11-19 RX ADMIN — OXYCODONE 5 MG: 5 TABLET ORAL at 11:23

## 2022-11-19 RX ADMIN — DOCUSATE SODIUM 50 MG AND SENNOSIDES 8.6 MG 1 TABLET: 8.6; 5 TABLET, FILM COATED ORAL at 20:35

## 2022-11-19 RX ADMIN — OXYCODONE 5 MG: 5 TABLET ORAL at 02:57

## 2022-11-19 RX ADMIN — ACETAMINOPHEN 1000 MG: 500 TABLET ORAL at 20:35

## 2022-11-19 RX ADMIN — ENOXAPARIN SODIUM 30 MG: 100 INJECTION SUBCUTANEOUS at 20:35

## 2022-11-19 RX ADMIN — ACETAMINOPHEN 1000 MG: 500 TABLET ORAL at 06:51

## 2022-11-19 RX ADMIN — ACETAMINOPHEN 1000 MG: 500 TABLET ORAL at 14:18

## 2022-11-19 RX ADMIN — METHOCARBAMOL TABLETS 750 MG: 750 TABLET, COATED ORAL at 17:33

## 2022-11-19 RX ADMIN — METHOCARBAMOL TABLETS 750 MG: 750 TABLET, COATED ORAL at 09:36

## 2022-11-19 ASSESSMENT — PAIN DESCRIPTION - ORIENTATION
ORIENTATION: LEFT
ORIENTATION: LEFT
ORIENTATION: LEFT;RIGHT
ORIENTATION: LEFT
ORIENTATION: LEFT

## 2022-11-19 ASSESSMENT — PAIN DESCRIPTION - LOCATION
LOCATION: ARM;LEG
LOCATION: SHOULDER;LEG
LOCATION: ARM;LEG
LOCATION: LEG;ARM

## 2022-11-19 ASSESSMENT — PAIN SCALES - WONG BAKER
WONGBAKER_NUMERICALRESPONSE: 0
WONGBAKER_NUMERICALRESPONSE: 6
WONGBAKER_NUMERICALRESPONSE: 0
WONGBAKER_NUMERICALRESPONSE: 4
WONGBAKER_NUMERICALRESPONSE: 0
WONGBAKER_NUMERICALRESPONSE: 0;4
WONGBAKER_NUMERICALRESPONSE: 0

## 2022-11-19 ASSESSMENT — PAIN DESCRIPTION - FREQUENCY
FREQUENCY: CONTINUOUS

## 2022-11-19 ASSESSMENT — PAIN SCALES - GENERAL
PAINLEVEL_OUTOF10: 4
PAINLEVEL_OUTOF10: 9
PAINLEVEL_OUTOF10: 9
PAINLEVEL_OUTOF10: 5
PAINLEVEL_OUTOF10: 8
PAINLEVEL_OUTOF10: 10
PAINLEVEL_OUTOF10: 6
PAINLEVEL_OUTOF10: 7
PAINLEVEL_OUTOF10: 2
PAINLEVEL_OUTOF10: 7
PAINLEVEL_OUTOF10: 7

## 2022-11-19 ASSESSMENT — PAIN DESCRIPTION - DESCRIPTORS
DESCRIPTORS: ACHING;CRAMPING
DESCRIPTORS: ACHING
DESCRIPTORS: ACHING
DESCRIPTORS: ACHING;CRAMPING
DESCRIPTORS: ACHING
DESCRIPTORS: ACHING

## 2022-11-19 ASSESSMENT — PAIN DESCRIPTION - PAIN TYPE
TYPE: SURGICAL PAIN

## 2022-11-19 ASSESSMENT — PAIN - FUNCTIONAL ASSESSMENT
PAIN_FUNCTIONAL_ASSESSMENT: PREVENTS OR INTERFERES SOME ACTIVE ACTIVITIES AND ADLS
PAIN_FUNCTIONAL_ASSESSMENT: PREVENTS OR INTERFERES WITH ALL ACTIVE AND SOME PASSIVE ACTIVITIES
PAIN_FUNCTIONAL_ASSESSMENT: PREVENTS OR INTERFERES WITH MANY ACTIVE NOT PASSIVE ACTIVITIES

## 2022-11-19 NOTE — PLAN OF CARE
Problem: Safety - Adult  Goal: Free from fall injury  Outcome: Progressing  Flowsheets (Taken 11/19/2022 0800)  Free From Fall Injury: Instruct family/caregiver on patient safety     Problem: Discharge Planning  Goal: Discharge to home or other facility with appropriate resources  Outcome: Progressing  Flowsheets (Taken 11/19/2022 0800)  Discharge to home or other facility with appropriate resources:   Identify barriers to discharge with patient and caregiver   Arrange for needed discharge resources and transportation as appropriate   Identify discharge learning needs (meds, wound care, etc)   Refer to discharge planning if patient needs post-hospital services based on physician order or complex needs related to functional status, cognitive ability or social support system     Problem: Skin/Tissue Integrity - Adult  Goal: Skin integrity remains intact  Outcome: Progressing  Flowsheets (Taken 11/19/2022 0800)  Skin Integrity Remains Intact:   Monitor for areas of redness and/or skin breakdown   Assess vascular access sites hourly   Every 4-6 hours minimum: Change oxygen saturation probe site  Goal: Incisions, wounds, or drain sites healing without S/S of infection  Outcome: Progressing  Goal: Oral mucous membranes remain intact  Outcome: Progressing     Problem: Genitourinary - Adult  Goal: Absence of urinary retention  Outcome: Progressing     Problem: ABCDS Injury Assessment  Goal: Absence of physical injury  Outcome: Progressing  Flowsheets (Taken 11/19/2022 0800)  Absence of Physical Injury: Implement safety measures based on patient assessment     Problem: Pain  Goal: Verbalizes/displays adequate comfort level or baseline comfort level  Outcome: Progressing  Flowsheets (Taken 11/19/2022 5004)  Verbalizes/displays adequate comfort level or baseline comfort level:   Encourage patient to monitor pain and request assistance   Assess pain using appropriate pain scale   Administer analgesics based on type and severity of pain and evaluate response   Implement non-pharmacological measures as appropriate and evaluate response   Consider cultural and social influences on pain and pain management   Notify Licensed Independent Practitioner if interventions unsuccessful or patient reports new pain   Plan of care continues as above. Patient up in chair. Family at the bedside.

## 2022-11-20 ENCOUNTER — APPOINTMENT (OUTPATIENT)
Dept: GENERAL RADIOLOGY | Age: 42
DRG: 463 | End: 2022-11-20
Payer: COMMERCIAL

## 2022-11-20 LAB
ABSOLUTE EOS #: 0.19 K/UL (ref 0–0.4)
ABSOLUTE IMMATURE GRANULOCYTE: 1.72 K/UL (ref 0–0.3)
ABSOLUTE LYMPH #: 2.1 K/UL (ref 1–4.8)
ABSOLUTE MONO #: 1.15 K/UL (ref 0.1–0.8)
BASOPHILS # BLD: 0 % (ref 0–2)
BASOPHILS ABSOLUTE: 0 K/UL (ref 0–0.2)
EOSINOPHILS RELATIVE PERCENT: 1 % (ref 1–4)
HCT VFR BLD CALC: 27.5 % (ref 40.7–50.3)
HEMOGLOBIN: 8.5 G/DL (ref 13–17)
IMMATURE GRANULOCYTES: 9 %
LYMPHOCYTES # BLD: 11 % (ref 24–44)
MCH RBC QN AUTO: 30.6 PG (ref 25.2–33.5)
MCHC RBC AUTO-ENTMCNC: 30.9 G/DL (ref 28.4–34.8)
MCV RBC AUTO: 98.9 FL (ref 82.6–102.9)
MONOCYTES # BLD: 6 % (ref 1–7)
MORPHOLOGY: ABNORMAL
NRBC AUTOMATED: 0.2 PER 100 WBC
PDW BLD-RTO: 14.3 % (ref 11.8–14.4)
PLATELET # BLD: 357 K/UL (ref 138–453)
PMV BLD AUTO: 10 FL (ref 8.1–13.5)
RBC # BLD: 2.78 M/UL (ref 4.21–5.77)
SEG NEUTROPHILS: 73 % (ref 36–66)
SEGMENTED NEUTROPHILS ABSOLUTE COUNT: 13.94 K/UL (ref 1.8–7.7)
WBC # BLD: 19.1 K/UL (ref 3.5–11.3)

## 2022-11-20 PROCEDURE — 6370000000 HC RX 637 (ALT 250 FOR IP): Performed by: STUDENT IN AN ORGANIZED HEALTH CARE EDUCATION/TRAINING PROGRAM

## 2022-11-20 PROCEDURE — 6360000002 HC RX W HCPCS

## 2022-11-20 PROCEDURE — 71045 X-RAY EXAM CHEST 1 VIEW: CPT

## 2022-11-20 PROCEDURE — 36415 COLL VENOUS BLD VENIPUNCTURE: CPT

## 2022-11-20 PROCEDURE — 94660 CPAP INITIATION&MGMT: CPT

## 2022-11-20 PROCEDURE — 2700000000 HC OXYGEN THERAPY PER DAY

## 2022-11-20 PROCEDURE — 85025 COMPLETE CBC W/AUTO DIFF WBC: CPT

## 2022-11-20 PROCEDURE — 6360000002 HC RX W HCPCS: Performed by: STUDENT IN AN ORGANIZED HEALTH CARE EDUCATION/TRAINING PROGRAM

## 2022-11-20 PROCEDURE — 97535 SELF CARE MNGMENT TRAINING: CPT

## 2022-11-20 PROCEDURE — 2060000000 HC ICU INTERMEDIATE R&B

## 2022-11-20 PROCEDURE — 97530 THERAPEUTIC ACTIVITIES: CPT

## 2022-11-20 RX ORDER — OXYCODONE HYDROCHLORIDE 5 MG/1
5 TABLET ORAL EVERY 6 HOURS PRN
Status: DISCONTINUED | OUTPATIENT
Start: 2022-11-20 | End: 2022-11-23

## 2022-11-20 RX ADMIN — ENOXAPARIN SODIUM 30 MG: 100 INJECTION SUBCUTANEOUS at 20:30

## 2022-11-20 RX ADMIN — GABAPENTIN 300 MG: 300 CAPSULE ORAL at 14:08

## 2022-11-20 RX ADMIN — DOCUSATE SODIUM 50 MG AND SENNOSIDES 8.6 MG 1 TABLET: 8.6; 5 TABLET, FILM COATED ORAL at 20:30

## 2022-11-20 RX ADMIN — OXYCODONE 5 MG: 5 TABLET ORAL at 09:16

## 2022-11-20 RX ADMIN — GABAPENTIN 300 MG: 300 CAPSULE ORAL at 20:30

## 2022-11-20 RX ADMIN — METHOCARBAMOL TABLETS 750 MG: 750 TABLET, COATED ORAL at 03:22

## 2022-11-20 RX ADMIN — ENOXAPARIN SODIUM 30 MG: 100 INJECTION SUBCUTANEOUS at 09:15

## 2022-11-20 RX ADMIN — ACETAMINOPHEN 1000 MG: 500 TABLET ORAL at 20:29

## 2022-11-20 RX ADMIN — GABAPENTIN 300 MG: 300 CAPSULE ORAL at 09:15

## 2022-11-20 RX ADMIN — ACETAMINOPHEN 1000 MG: 500 TABLET ORAL at 14:08

## 2022-11-20 RX ADMIN — BACITRACIN: 500 OINTMENT TOPICAL at 20:30

## 2022-11-20 RX ADMIN — METHOCARBAMOL TABLETS 750 MG: 750 TABLET, COATED ORAL at 09:15

## 2022-11-20 RX ADMIN — ACETAMINOPHEN 1000 MG: 500 TABLET ORAL at 06:05

## 2022-11-20 RX ADMIN — BACITRACIN: 500 OINTMENT TOPICAL at 09:15

## 2022-11-20 RX ADMIN — KETOROLAC TROMETHAMINE 15 MG: 15 INJECTION, SOLUTION INTRAMUSCULAR; INTRAVENOUS at 20:30

## 2022-11-20 RX ADMIN — METHOCARBAMOL TABLETS 750 MG: 750 TABLET, COATED ORAL at 17:15

## 2022-11-20 RX ADMIN — KETOROLAC TROMETHAMINE 15 MG: 15 INJECTION, SOLUTION INTRAMUSCULAR; INTRAVENOUS at 03:22

## 2022-11-20 RX ADMIN — OXYCODONE 5 MG: 5 TABLET ORAL at 20:30

## 2022-11-20 RX ADMIN — OXYCODONE 5 MG: 5 TABLET ORAL at 03:22

## 2022-11-20 RX ADMIN — BACITRACIN: 500 OINTMENT TOPICAL at 14:08

## 2022-11-20 RX ADMIN — KETOROLAC TROMETHAMINE 15 MG: 15 INJECTION, SOLUTION INTRAMUSCULAR; INTRAVENOUS at 14:11

## 2022-11-20 ASSESSMENT — PAIN SCALES - WONG BAKER
WONGBAKER_NUMERICALRESPONSE: 0
WONGBAKER_NUMERICALRESPONSE: 4
WONGBAKER_NUMERICALRESPONSE: 0
WONGBAKER_NUMERICALRESPONSE: 2
WONGBAKER_NUMERICALRESPONSE: 0
WONGBAKER_NUMERICALRESPONSE: 2
WONGBAKER_NUMERICALRESPONSE: 0
WONGBAKER_NUMERICALRESPONSE: 6
WONGBAKER_NUMERICALRESPONSE: 0

## 2022-11-20 ASSESSMENT — PAIN DESCRIPTION - LOCATION
LOCATION: ARM
LOCATION: LEG;SHOULDER
LOCATION: ARM
LOCATION: ARM

## 2022-11-20 ASSESSMENT — PAIN - FUNCTIONAL ASSESSMENT
PAIN_FUNCTIONAL_ASSESSMENT: PREVENTS OR INTERFERES SOME ACTIVE ACTIVITIES AND ADLS
PAIN_FUNCTIONAL_ASSESSMENT: PREVENTS OR INTERFERES WITH ALL ACTIVE AND SOME PASSIVE ACTIVITIES

## 2022-11-20 ASSESSMENT — PAIN DESCRIPTION - ORIENTATION
ORIENTATION: RIGHT
ORIENTATION: LEFT
ORIENTATION: RIGHT
ORIENTATION: RIGHT

## 2022-11-20 ASSESSMENT — PAIN SCALES - GENERAL
PAINLEVEL_OUTOF10: 8
PAINLEVEL_OUTOF10: 5
PAINLEVEL_OUTOF10: 10
PAINLEVEL_OUTOF10: 6
PAINLEVEL_OUTOF10: 8
PAINLEVEL_OUTOF10: 9
PAINLEVEL_OUTOF10: 7

## 2022-11-20 ASSESSMENT — PAIN DESCRIPTION - DESCRIPTORS
DESCRIPTORS: ACHING
DESCRIPTORS: ACHING;BURNING
DESCRIPTORS: ACHING;BURNING;DISCOMFORT

## 2022-11-20 ASSESSMENT — PAIN DESCRIPTION - PAIN TYPE: TYPE: SURGICAL PAIN;ACUTE PAIN

## 2022-11-20 ASSESSMENT — PAIN DESCRIPTION - FREQUENCY
FREQUENCY: CONTINUOUS
FREQUENCY: CONTINUOUS

## 2022-11-20 NOTE — PLAN OF CARE
Problem: Discharge Planning  Goal: Discharge to home or other facility with appropriate resources  11/20/2022 0030 by Malena Quiñones RN  Outcome: Progressing  11/19/2022 1524 by Jose Roberto Yip RN  Outcome: Progressing  Flowsheets (Taken 11/19/2022 0800)  Discharge to home or other facility with appropriate resources:   Identify barriers to discharge with patient and caregiver   Arrange for needed discharge resources and transportation as appropriate   Identify discharge learning needs (meds, wound care, etc)   Refer to discharge planning if patient needs post-hospital services based on physician order or complex needs related to functional status, cognitive ability or social support system     Problem: Pain  Goal: Verbalizes/displays adequate comfort level or baseline comfort level  11/20/2022 0030 by Malena Quiñones RN  Outcome: Progressing  11/19/2022 1524 by Jose Roberto Yip RN  Outcome: Progressing  Flowsheets (Taken 11/19/2022 0755)  Verbalizes/displays adequate comfort level or baseline comfort level:   Encourage patient to monitor pain and request assistance   Assess pain using appropriate pain scale   Administer analgesics based on type and severity of pain and evaluate response   Implement non-pharmacological measures as appropriate and evaluate response   Consider cultural and social influences on pain and pain management   Notify Licensed Independent Practitioner if interventions unsuccessful or patient reports new pain     Problem: Safety - Adult  Goal: Free from fall injury  11/20/2022 0030 by Malena Quiñones RN  Outcome: Progressing  11/19/2022 1524 by Jose Roberto Yip RN  Outcome: Progressing  Flowsheets (Taken 11/19/2022 0800)  Free From Fall Injury: Instruct family/caregiver on patient safety     Problem: Skin/Tissue Integrity - Adult  Goal: Skin integrity remains intact  11/20/2022 0030 by Malena Quiñones RN  Outcome: Progressing  11/19/2022 1524 by Jose Roberto Yip RN  Outcome: Progressing  Flowsheets (Taken 11/19/2022 0800)  Skin Integrity Remains Intact:   Monitor for areas of redness and/or skin breakdown   Assess vascular access sites hourly   Every 4-6 hours minimum: Change oxygen saturation probe site  Goal: Incisions, wounds, or drain sites healing without S/S of infection  11/20/2022 0030 by Anjali Price RN  Outcome: Progressing  11/19/2022 1524 by Angella Ceja RN  Outcome: Progressing  Goal: Oral mucous membranes remain intact  11/20/2022 0030 by Anjali Price RN  Outcome: Progressing  11/19/2022 1524 by Angella Ceja RN  Outcome: Progressing     Problem: Genitourinary - Adult  Goal: Absence of urinary retention  11/20/2022 0030 by Anjali Price RN  Outcome: Progressing  11/19/2022 1524 by Angella Ceja RN  Outcome: Progressing     Problem: ABCDS Injury Assessment  Goal: Absence of physical injury  11/20/2022 0030 by Anjali Price RN  Outcome: Progressing  11/19/2022 1524 by Angella Ceja RN  Outcome: Progressing  Flowsheets (Taken 11/19/2022 0800)  Absence of Physical Injury: Implement safety measures based on patient assessment

## 2022-11-20 NOTE — PROGRESS NOTES
Patient had urgency of stool and was incontinent. Large soft stool as charted. Patient cleaned up. Bath, justine-care, linen change, and repositioning completed with aide.

## 2022-11-20 NOTE — CARE COORDINATION
Transitional Planning:  [facility] at [phone] to speak with admissions. Reached . Left message with Arleen Grijalva for call back.

## 2022-11-20 NOTE — PLAN OF CARE
Problem: Safety - Adult  Goal: Free from fall injury  Outcome: Progressing  Flowsheets (Taken 11/20/2022 0800)  Free From Fall Injury: Instruct family/caregiver on patient safety     Problem: Discharge Planning  Goal: Discharge to home or other facility with appropriate resources  Outcome: Progressing  Flowsheets (Taken 11/20/2022 0800)  Discharge to home or other facility with appropriate resources:   Identify barriers to discharge with patient and caregiver   Arrange for needed discharge resources and transportation as appropriate   Identify discharge learning needs (meds, wound care, etc)   Refer to discharge planning if patient needs post-hospital services based on physician order or complex needs related to functional status, cognitive ability or social support system     Problem: Skin/Tissue Integrity - Adult  Goal: Skin integrity remains intact  Outcome: Progressing  Flowsheets (Taken 11/20/2022 0800)  Skin Integrity Remains Intact:   Monitor for areas of redness and/or skin breakdown   Every 4-6 hours minimum: Change oxygen saturation probe site  Goal: Incisions, wounds, or drain sites healing without S/S of infection  Outcome: Progressing  Flowsheets (Taken 11/20/2022 0800)  Incisions, Wounds, or Drain Sites Healing Without Sign and Symptoms of Infection: TWICE DAILY: Assess and document skin integrity  Goal: Oral mucous membranes remain intact  Outcome: Progressing  Flowsheets (Taken 11/20/2022 0800)  Oral Mucous Membranes Remain Intact: Assess oral mucosa and hygiene practices     Problem: ABCDS Injury Assessment  Goal: Absence of physical injury  Outcome: Progressing  Flowsheets (Taken 11/20/2022 0800)  Absence of Physical Injury: Implement safety measures based on patient assessment     Problem: Pain  Goal: Verbalizes/displays adequate comfort level or baseline comfort level  Outcome: Progressing   Patient voiding clear yellow urine. Able to make his needs known. Medicated for pain per MAR.   Plan of care continues as above.
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
## 2022-11-20 NOTE — PROGRESS NOTES
Occupational Therapy  Facility/Department: [facility] 4A STEPDOWN  Occupational Therapy Daily Treatment Note    Name: Colby Boo  : 1980  MRN: [id number]  Date of Service: 2022    Discharge Recommendations:  Patient would benefit from continued therapy after discharge in order to increase pt balance, strength and independence         Assessment   Performance deficits / Impairments: Decreased functional mobility ; Decreased endurance;Decreased coordination;Decreased ADL status; Decreased posture;Decreased balance;Decreased ROM; Decreased strength;Decreased high-level IADLs;Decreased fine motor control;Decreased safe awareness  Prognosis: Good  REQUIRES OT FOLLOW-UP: Yes  Activity Tolerance  Activity Tolerance: Patient limited by pain  Activity Tolerance Comments: pt self limiting req increrased encouragement to attempt tasks without assist        Plan   Occupational Therapy Plan  Times Per Week: 3-4x     Restrictions  Restrictions/Precautions  Restrictions/Precautions: Fall Risk, Weight Bearing, General Precautions  Required Braces or Orthoses?: No  Lower Extremity Weight Bearing Restrictions  Left Lower Extremity Weight Bearing: Non Weight Bearing  Upper Extremity Weight Bearing Restrictions  Right Upper Extremity Weight Bearing: Non Weight Bearing  Left Upper Extremity Weight Bearing: Non Weight Bearing  Other: \"Partial Weight Bearing of 5 lbs to bilateral upper extremities   ROM as tolerated bilateral upper extremities\"  Position Activity Restriction  Other position/activity restrictions: extubated ; wound vac to L LE;    Subjective   General  Chart Reviewed: Yes  Family / Caregiver Present: Yes (spouse and mother)  General Comment  Comments: Pt and RN agreeable to therapy this day. Pt supine in bed at start of session and retired to seated in chair at session end with call light in reach, and al needs met. Pt c/o 8/10 pain in B shoulders.             Objective        Safety Devices  Type of Devices: Gait belt;Left in chair;Call light within reach;Nurse notified  Restraints  Restraints Initially in Place: No  Balance  Sitting: With support (CGA increasing to SBA tolerating approx 5 min static sitting unsupported at EOB)  Standing: With support (Min A x2 static standing with face to face assist)  Gait  Overall Level of Assistance:  (pt unable to take steps d/t NWB of 3 extremities)        ADL  UE Dressing: Maximum assistance;Setup;Verbal cueing; Increased time to complete  UE Dressing Skilled Clinical Factors: Max A to don gown reqe verbal cues for intitiation  LE Dressing: Dependent/Total  LE Dressing Skilled Clinical Factors: To don B socks  Additional Comments: ADLs not attempted d/t pt pain and fatigue after transfers        Bed mobility  Rolling to Left: Moderate assistance  Rolling to Right: 2 Person assistance; Moderate assistance  Supine to Sit: 2 Person assistance;Maximum assistance  Scooting: Moderate assistance;2 Person assistance  Bed Mobility Comments: HOB elevated, demo F carry over of NWB precautions  Transfers  Stand Pivot Transfers: Moderate assistance;2 Person assistance  Sit to stand: Moderate assistance;2 Person assistance  Stand to sit: 2 Person assistance; Moderate assistance  Transfer Comments: utilizing face to face asist demo F carry over of NWB adherence     Cognition  Overall Cognitive Status: WFL  Orientation  Overall Orientation Status: Within Functional Limits                  Education Given To: Patient  Education Provided: Role of Therapy;Transfer Training;Precautions  Education Provided Comments: proper foot placement; WB adherence; importance of activity; 5# lifting restriction-F carry over  Education Method: Verbal  Education Outcome: Continued education needed                       AM-PAC Score        AM-PAC Inpatient Daily Activity Raw Score: 11 (11/20/22 1404)  AM-PAC Inpatient ADL T-Scale Score : 29.04 (11/20/22 1404)  ADL Inpatient CMS 0-100% Score: 70.42 (11/20/22 1404)  ADL Inpatient CMS G-Code Modifier : CL (11/20/22 1496)      Goals  Short Term Goals  Time Frame for Short Term Goals: Pt will by discharge  Short Term Goal 1: demo dynamic sitting EOB during func activity for 20 min+ at mod I  Short Term Goal 2: demo standing using LRD PRN and mod Ax1 for 30 seconds+ while maintaining BUE prec. Short Term Goal 3: demo mod Ax1 for all bed mobility  Short Term Goal 4: identify 2 non-pharmacological pain-relieving techniques with 1 cue only  Short Term Goal 5: demo ADL simple feeding/grooming tasks using BUE's and built-up handle PRN, hand-over-hand at min A  Short Term Goal 6: demo stand pivot bed>chair transfer to R side maintaining LLE NWB and mod Ax1 (updated on 11/16 by SARKIS Owen)  Short Term Goal 7: demo ADL UB/LB dressing/bathing activity at mod A and adaptive techniques/AE PRN, setup, increased time (updated on 11/16 by SARKIS Owen)       Therapy Time   Individual Concurrent Group Co-treatment   Time In 1059         Time Out 1127         Minutes 28         Timed Code Treatment Minutes: 14 Minutes (co tx with PTA)       Pj Riser, QUINTANA/L

## 2022-11-21 LAB
ABSOLUTE EOS #: 0.17 K/UL (ref 0–0.4)
ABSOLUTE IMMATURE GRANULOCYTE: 0.35 K/UL (ref 0–0.3)
ABSOLUTE LYMPH #: 2.08 K/UL (ref 1–4.8)
ABSOLUTE MONO #: 1.38 K/UL (ref 0.1–0.8)
ANION GAP SERPL CALCULATED.3IONS-SCNC: 10 MMOL/L (ref 9–17)
BASOPHILS # BLD: 2 % (ref 0–2)
BASOPHILS ABSOLUTE: 0.35 K/UL (ref 0–0.2)
BUN BLDV-MCNC: 16 MG/DL (ref 6–20)
CALCIUM SERPL-MCNC: 8.2 MG/DL (ref 8.6–10.4)
CHLORIDE BLD-SCNC: 100 MMOL/L (ref 98–107)
CO2: 23 MMOL/L (ref 20–31)
CREAT SERPL-MCNC: 0.68 MG/DL (ref 0.7–1.2)
EOSINOPHILS RELATIVE PERCENT: 1 % (ref 1–4)
GFR SERPL CREATININE-BSD FRML MDRD: >60 ML/MIN/1.73M2
GLUCOSE BLD-MCNC: 102 MG/DL (ref 70–99)
HCT VFR BLD CALC: 27.6 % (ref 40.7–50.3)
HEMOGLOBIN: 8 G/DL (ref 13–17)
IMMATURE GRANULOCYTES: 2 %
LYMPHOCYTES # BLD: 12 % (ref 24–44)
MCH RBC QN AUTO: 30.1 PG (ref 25.2–33.5)
MCHC RBC AUTO-ENTMCNC: 29 G/DL (ref 28.4–34.8)
MCV RBC AUTO: 103.8 FL (ref 82.6–102.9)
MONOCYTES # BLD: 8 % (ref 1–7)
MORPHOLOGY: ABNORMAL
MORPHOLOGY: ABNORMAL
NRBC AUTOMATED: 0 PER 100 WBC
PDW BLD-RTO: 14.2 % (ref 11.8–14.4)
PLATELET # BLD: 362 K/UL (ref 138–453)
PMV BLD AUTO: 10.2 FL (ref 8.1–13.5)
POTASSIUM SERPL-SCNC: 4.2 MMOL/L (ref 3.7–5.3)
RBC # BLD: 2.66 M/UL (ref 4.21–5.77)
SEG NEUTROPHILS: 75 % (ref 36–66)
SEGMENTED NEUTROPHILS ABSOLUTE COUNT: 12.97 K/UL (ref 1.8–7.7)
SODIUM BLD-SCNC: 133 MMOL/L (ref 135–144)
WBC # BLD: 17.3 K/UL (ref 3.5–11.3)

## 2022-11-21 PROCEDURE — 6370000000 HC RX 637 (ALT 250 FOR IP): Performed by: STUDENT IN AN ORGANIZED HEALTH CARE EDUCATION/TRAINING PROGRAM

## 2022-11-21 PROCEDURE — 36415 COLL VENOUS BLD VENIPUNCTURE: CPT

## 2022-11-21 PROCEDURE — 6360000002 HC RX W HCPCS: Performed by: STUDENT IN AN ORGANIZED HEALTH CARE EDUCATION/TRAINING PROGRAM

## 2022-11-21 PROCEDURE — 2060000000 HC ICU INTERMEDIATE R&B

## 2022-11-21 PROCEDURE — 80048 BASIC METABOLIC PNL TOTAL CA: CPT

## 2022-11-21 PROCEDURE — 2580000003 HC RX 258: Performed by: STUDENT IN AN ORGANIZED HEALTH CARE EDUCATION/TRAINING PROGRAM

## 2022-11-21 PROCEDURE — 6360000002 HC RX W HCPCS

## 2022-11-21 PROCEDURE — 97530 THERAPEUTIC ACTIVITIES: CPT

## 2022-11-21 PROCEDURE — 85025 COMPLETE CBC W/AUTO DIFF WBC: CPT

## 2022-11-21 PROCEDURE — 97110 THERAPEUTIC EXERCISES: CPT

## 2022-11-21 PROCEDURE — 94761 N-INVAS EAR/PLS OXIMETRY MLT: CPT

## 2022-11-21 RX ORDER — GABAPENTIN 300 MG/1
300 CAPSULE ORAL 3 TIMES DAILY
Qty: 9 CAPSULE | Refills: 0 | Status: SHIPPED | OUTPATIENT
Start: 2022-11-21 | End: 2022-11-24

## 2022-11-21 RX ORDER — OXYCODONE HYDROCHLORIDE 5 MG/1
5 TABLET ORAL EVERY 6 HOURS PRN
Qty: 12 TABLET | Refills: 0 | Status: SHIPPED | OUTPATIENT
Start: 2022-11-21 | End: 2022-11-24

## 2022-11-21 RX ORDER — GABAPENTIN 300 MG/1
300 CAPSULE ORAL 3 TIMES DAILY
Qty: 9 CAPSULE | Refills: 0 | Status: SHIPPED | OUTPATIENT
Start: 2022-11-21 | End: 2022-11-21 | Stop reason: SDUPTHER

## 2022-11-21 RX ORDER — GINSENG 100 MG
CAPSULE ORAL
Qty: 1 EACH | Refills: 0 | Status: SHIPPED | OUTPATIENT
Start: 2022-11-21 | End: 2022-12-01

## 2022-11-21 RX ORDER — OXYCODONE HYDROCHLORIDE 5 MG/1
5 TABLET ORAL EVERY 6 HOURS PRN
Qty: 12 TABLET | Refills: 0 | Status: SHIPPED | OUTPATIENT
Start: 2022-11-21 | End: 2022-11-21 | Stop reason: SDUPTHER

## 2022-11-21 RX ORDER — METHOCARBAMOL 750 MG/1
750 TABLET, FILM COATED ORAL EVERY 8 HOURS
Qty: 10 TABLET | Refills: 0 | Status: SHIPPED | OUTPATIENT
Start: 2022-11-21 | End: 2022-11-24

## 2022-11-21 RX ADMIN — GABAPENTIN 300 MG: 300 CAPSULE ORAL at 20:35

## 2022-11-21 RX ADMIN — POLYETHYLENE GLYCOL 3350 17 G: 17 POWDER, FOR SOLUTION ORAL at 09:33

## 2022-11-21 RX ADMIN — SODIUM CHLORIDE, PRESERVATIVE FREE 10 ML: 5 INJECTION INTRAVENOUS at 19:41

## 2022-11-21 RX ADMIN — KETOROLAC TROMETHAMINE 15 MG: 15 INJECTION, SOLUTION INTRAMUSCULAR; INTRAVENOUS at 04:34

## 2022-11-21 RX ADMIN — ACETAMINOPHEN 1000 MG: 500 TABLET ORAL at 20:35

## 2022-11-21 RX ADMIN — DOCUSATE SODIUM 50 MG AND SENNOSIDES 8.6 MG 1 TABLET: 8.6; 5 TABLET, FILM COATED ORAL at 09:33

## 2022-11-21 RX ADMIN — ENOXAPARIN SODIUM 30 MG: 100 INJECTION SUBCUTANEOUS at 09:34

## 2022-11-21 RX ADMIN — SODIUM CHLORIDE, PRESERVATIVE FREE 10 ML: 5 INJECTION INTRAVENOUS at 09:34

## 2022-11-21 RX ADMIN — GABAPENTIN 300 MG: 300 CAPSULE ORAL at 09:33

## 2022-11-21 RX ADMIN — OXYCODONE 5 MG: 5 TABLET ORAL at 09:33

## 2022-11-21 RX ADMIN — BACITRACIN: 500 OINTMENT TOPICAL at 13:52

## 2022-11-21 RX ADMIN — ACETAMINOPHEN 1000 MG: 500 TABLET ORAL at 04:35

## 2022-11-21 RX ADMIN — METHOCARBAMOL TABLETS 750 MG: 750 TABLET, COATED ORAL at 03:24

## 2022-11-21 RX ADMIN — ENOXAPARIN SODIUM 30 MG: 100 INJECTION SUBCUTANEOUS at 20:35

## 2022-11-21 RX ADMIN — OXYCODONE 5 MG: 5 TABLET ORAL at 20:35

## 2022-11-21 RX ADMIN — BACITRACIN: 500 OINTMENT TOPICAL at 09:34

## 2022-11-21 RX ADMIN — ACETAMINOPHEN 1000 MG: 500 TABLET ORAL at 13:52

## 2022-11-21 RX ADMIN — BACITRACIN: 500 OINTMENT TOPICAL at 19:40

## 2022-11-21 RX ADMIN — KETOROLAC TROMETHAMINE 15 MG: 15 INJECTION, SOLUTION INTRAMUSCULAR; INTRAVENOUS at 19:40

## 2022-11-21 RX ADMIN — METHOCARBAMOL TABLETS 750 MG: 750 TABLET, COATED ORAL at 09:34

## 2022-11-21 RX ADMIN — KETOROLAC TROMETHAMINE 15 MG: 15 INJECTION, SOLUTION INTRAMUSCULAR; INTRAVENOUS at 13:52

## 2022-11-21 RX ADMIN — GABAPENTIN 300 MG: 300 CAPSULE ORAL at 13:52

## 2022-11-21 RX ADMIN — METHOCARBAMOL TABLETS 750 MG: 750 TABLET, COATED ORAL at 18:17

## 2022-11-21 ASSESSMENT — PAIN SCALES - WONG BAKER
WONGBAKER_NUMERICALRESPONSE: 0

## 2022-11-21 ASSESSMENT — PAIN DESCRIPTION - DESCRIPTORS
DESCRIPTORS: ACHING;SORE;TENDER;THROBBING
DESCRIPTORS: ACHING
DESCRIPTORS: ACHING;SORE;TENDER
DESCRIPTORS: ACHING

## 2022-11-21 ASSESSMENT — PAIN SCALES - GENERAL
PAINLEVEL_OUTOF10: 9
PAINLEVEL_OUTOF10: 7
PAINLEVEL_OUTOF10: 9

## 2022-11-21 ASSESSMENT — PAIN DESCRIPTION - LOCATION
LOCATION: LEG
LOCATION: LEG;ARM

## 2022-11-21 ASSESSMENT — PAIN DESCRIPTION - ORIENTATION
ORIENTATION: LEFT
ORIENTATION: RIGHT
ORIENTATION: LEFT

## 2022-11-21 ASSESSMENT — PAIN - FUNCTIONAL ASSESSMENT: PAIN_FUNCTIONAL_ASSESSMENT: PREVENTS OR INTERFERES SOME ACTIVE ACTIVITIES AND ADLS

## 2022-11-21 ASSESSMENT — PAIN DESCRIPTION - FREQUENCY: FREQUENCY: CONTINUOUS
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
## 2022-11-21 NOTE — PROGRESS NOTES
In assisting to coordinate disposition plan for Mr. Eldora Bence have spoken with him and his wife at bedside today. Patient states after a conversation with  this afternoon,  he anticipates having the LLE wound vac removed tomorrow and awaits acceptance to a [facility] closer to his home near [facility]. Per patient he was informed to return by  to follow up for skin grafting in 2 weeks, on or about 12/5/22. In the process of communicating disposition updates with the patient, BODØ RN Case Manager and Soraida  for [address] [place] [address] with [employer]. I relayed the current expectation from the patient, is to be discharged to a [facility] and receive transportation back to follow up with  in 2 weeks.

## 2022-11-21 NOTE — PROGRESS NOTES
Occupational Therapy  Facility/Department: [facility] 4A STEPDOWN  Occupational Therapy Daily Treatment Note    Name: Tristan Arriaga  : 1980  MRN: [id number]  Date of Service: 2022    Discharge Recommendations:  Patient would benefit from continued therapy after discharge in order to increase pt balance, strength and independence          Assessment   Performance deficits / Impairments: Decreased functional mobility ; Decreased endurance;Decreased coordination;Decreased ADL status; Decreased posture;Decreased balance;Decreased strength;Decreased high-level IADLs;Decreased safe awareness  Prognosis: Good  REQUIRES OT FOLLOW-UP: Yes  Activity Tolerance  Activity Tolerance: Patient limited by pain        Plan   Occupational Therapy Plan  Times Per Week: 3-4x     Restrictions  Restrictions/Precautions  Restrictions/Precautions: Fall Risk, Weight Bearing, General Precautions  Required Braces or Orthoses?: No  Lower Extremity Weight Bearing Restrictions  Left Lower Extremity Weight Bearing: Non Weight Bearing  Upper Extremity Weight Bearing Restrictions  Right Upper Extremity Weight Bearing: Non Weight Bearing  Left Upper Extremity Weight Bearing: Non Weight Bearing  Other: \"Partial Weight Bearing of 5 lbs to bilateral upper extremities   ROM as tolerated bilateral upper extremities\"  Position Activity Restriction  Other position/activity restrictions: extubated ; wound vac to L LE;    Subjective   General  Chart Reviewed: Yes  Family / Caregiver Present: Yes (spouse and mother)  General Comment  Comments: Pt and RN agreeable to therapy this day. Pt supine in bed at start of session and retired to seated in chair at session end with call light in reach and all needs met. Pt c/o pain in B shoulders            Objective        Safety Devices  Type of Devices: Gait belt;Call light within reach; Left in chair;Nurse notified  Restraints  Restraints Initially in Place: No  Balance  Sitting: Without support (Pt tolerated approx 6 min static sitting at EOB)  Standing: With support (Mod A x2 static standing with face to face assist)        ADL  UE Dressing: Moderate assistance;Setup;Verbal cueing; Increased time to complete  UE Dressing Skilled Clinical Factors: Mod A to don gown supine in bed  LE Dressing: Maximum assistance;Setup  LE Dressing Skilled Clinical Factors: To don B socks  Additional Comments: Pt declined all other ADLs. Throughout session pt limited per pain, fatigue and WB status     Activity Tolerance  Activity Tolerance: Patient limited by endurance; Patient limited by pain  Bed mobility  Supine to Sit: Moderate assistance;2 Person assistance  Scooting: Contact guard assistance  Bed Mobility Comments: HOB elevated, required assistance with trunk and LLE progression. Transfers  Stand Pivot Transfers: Moderate assistance;2 Person assistance  Sit to stand:  Moderate assistance;2 Person assistance  Stand to sit: Moderate assistance;2 Person assistance  Transfer Comments: utilizing face to face assist demo F carry over of NWB adherence     Cognition  Overall Cognitive Status: WFL  Orientation  Overall Orientation Status: Within Functional Limits               Exercise Treatment: grasp/release excerecise facilitated 10 reps, 1 set for BUE utilizing pink foam block in attempt to increase pt strength and ROM for ADL tasks  Education Given To: Patient  Education Provided: Role of Therapy;Transfer Training;Precautions  Education Provided Comments: proper foot placement; WB adherence; importance of activity; 5# lifting restriction-F carry over  Education Method: Verbal  Education Outcome: Continued education needed                                 AM-PAC Score        AM-[facility] Inpatient Daily Activity Raw Score: 11 (11/20/22 1404)  AM-PAC Inpatient ADL T-Scale Score : 29.04 (11/20/22 1404)  ADL Inpatient CMS 0-100% Score: 70.42 (11/20/22 1404)  ADL Inpatient CMS G-Code Modifier : CL (11/20/22 1404)        Goals  Short Term Goals  Time Frame for Short Term Goals: Pt will by discharge  Short Term Goal 1: demo dynamic sitting EOB during func activity for 20 min+ at mod I  Short Term Goal 2: demo standing using LRD PRN and mod Ax1 for 30 seconds+ while maintaining BUE prec. Short Term Goal 3: demo mod Ax1 for all bed mobility  Short Term Goal 4: identify 2 non-pharmacological pain-relieving techniques with 1 cue only  Short Term Goal 5: demo ADL simple feeding/grooming tasks using BUE's and built-up handle PRN, hand-over-hand at min A  Short Term Goal 6: demo stand pivot bed>chair transfer to R side maintaining LLE NWB and mod Ax1 (updated on 11/16 by SARKIS Owen)  Short Term Goal 7: demo ADL UB/LB dressing/bathing activity at mod A and adaptive techniques/AE PRN, setup, increased time (updated on 11/16 by SARKIS Owen)       Therapy Time   Individual Concurrent Group Co-treatment   Time In 0955         Time Out 1026         Minutes 31         Timed Code Treatment Minutes: 10 Minutes (co tx with PTA)       LILIAN Jaquez/VIVI
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
## 2022-11-21 NOTE — PLAN OF CARE
Problem: Pain  Goal: Verbalizes/displays adequate comfort level or baseline comfort level  11/20/2022 2318 by Johnita Boast, RN  Outcome: Progressing  11/20/2022 1445 by Eda Plummer RN  Outcome: Progressing
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
## 2022-11-21 NOTE — PLAN OF CARE
Problem: Safety - Adult  Goal: Free from fall injury  11/21/2022 0950 by Kary Aj RN  Outcome: Progressing  11/20/2022 2318 by Aurelio Morrell RN  Outcome: Progressing     Problem: Discharge Planning  Goal: Discharge to home or other facility with appropriate resources  11/21/2022 0950 by Kary Aj RN  Outcome: Progressing  11/20/2022 2318 by Aurelio Morrell RN  Outcome: Progressing     Problem: Skin/Tissue Integrity - Adult  Goal: Skin integrity remains intact  11/21/2022 0950 by Kary Aj RN  Outcome: Progressing  11/20/2022 2318 by Aurelio Morrell RN  Outcome: Progressing  Goal: Incisions, wounds, or drain sites healing without S/S of infection  11/21/2022 0950 by Kary Aj RN  Outcome: Progressing  11/20/2022 2318 by Aurelio Morrell RN  Outcome: Progressing  Goal: Oral mucous membranes remain intact  11/21/2022 0950 by Kary Aj RN  Outcome: Progressing  11/20/2022 2318 by Aurelio Morrell RN  Outcome: Progressing     Problem: ABCDS Injury Assessment  Goal: Absence of physical injury  11/21/2022 0950 by Kary Aj RN  Outcome: Progressing  11/20/2022 2318 by Aurelio Morrell RN  Outcome: Progressing     Problem: Pain  Goal: Verbalizes/displays adequate comfort level or baseline comfort level  11/21/2022 0950 by Kary Aj RN  Outcome: Progressing  11/20/2022 2318 by Aurelio Morrell RN  Outcome: Progressing
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
## 2022-11-22 PROBLEM — S82.202C TYPE III OPEN FRACTURE OF LEFT TIBIA AND FIBULA: Status: ACTIVE | Noted: 2022-11-22

## 2022-11-22 PROBLEM — S82.402C TYPE III OPEN FRACTURE OF LEFT TIBIA AND FIBULA: Status: ACTIVE | Noted: 2022-11-22

## 2022-11-22 PROCEDURE — 6370000000 HC RX 637 (ALT 250 FOR IP): Performed by: STUDENT IN AN ORGANIZED HEALTH CARE EDUCATION/TRAINING PROGRAM

## 2022-11-22 PROCEDURE — 97530 THERAPEUTIC ACTIVITIES: CPT

## 2022-11-22 PROCEDURE — 94760 N-INVAS EAR/PLS OXIMETRY 1: CPT

## 2022-11-22 PROCEDURE — 6360000002 HC RX W HCPCS: Performed by: STUDENT IN AN ORGANIZED HEALTH CARE EDUCATION/TRAINING PROGRAM

## 2022-11-22 PROCEDURE — 97110 THERAPEUTIC EXERCISES: CPT

## 2022-11-22 PROCEDURE — 2060000000 HC ICU INTERMEDIATE R&B

## 2022-11-22 RX ORDER — OXYCODONE HYDROCHLORIDE 5 MG/1
10 TABLET ORAL ONCE
Status: DISCONTINUED | OUTPATIENT
Start: 2022-11-22 | End: 2022-11-23

## 2022-11-22 RX ADMIN — ACETAMINOPHEN 1000 MG: 500 TABLET ORAL at 21:44

## 2022-11-22 RX ADMIN — OXYCODONE 5 MG: 5 TABLET ORAL at 21:45

## 2022-11-22 RX ADMIN — GABAPENTIN 300 MG: 300 CAPSULE ORAL at 14:36

## 2022-11-22 RX ADMIN — OXYCODONE 5 MG: 5 TABLET ORAL at 16:34

## 2022-11-22 RX ADMIN — ENOXAPARIN SODIUM 30 MG: 100 INJECTION SUBCUTANEOUS at 21:45

## 2022-11-22 RX ADMIN — POLYETHYLENE GLYCOL 3350 17 G: 17 POWDER, FOR SOLUTION ORAL at 09:27

## 2022-11-22 RX ADMIN — METHOCARBAMOL TABLETS 750 MG: 750 TABLET, COATED ORAL at 09:27

## 2022-11-22 RX ADMIN — BACITRACIN: 500 OINTMENT TOPICAL at 14:37

## 2022-11-22 RX ADMIN — OXYCODONE 5 MG: 5 TABLET ORAL at 12:04

## 2022-11-22 RX ADMIN — OXYCODONE 5 MG: 5 TABLET ORAL at 01:14

## 2022-11-22 RX ADMIN — BACITRACIN: 500 OINTMENT TOPICAL at 21:46

## 2022-11-22 RX ADMIN — METHOCARBAMOL TABLETS 750 MG: 750 TABLET, COATED ORAL at 01:14

## 2022-11-22 RX ADMIN — ENOXAPARIN SODIUM 30 MG: 100 INJECTION SUBCUTANEOUS at 09:27

## 2022-11-22 RX ADMIN — OXYCODONE 5 MG: 5 TABLET ORAL at 06:36

## 2022-11-22 RX ADMIN — GABAPENTIN 300 MG: 300 CAPSULE ORAL at 21:45

## 2022-11-22 RX ADMIN — ACETAMINOPHEN 1000 MG: 500 TABLET ORAL at 14:36

## 2022-11-22 RX ADMIN — ACETAMINOPHEN 1000 MG: 500 TABLET ORAL at 06:36

## 2022-11-22 RX ADMIN — METHOCARBAMOL TABLETS 750 MG: 750 TABLET, COATED ORAL at 17:13

## 2022-11-22 RX ADMIN — BACITRACIN: 500 OINTMENT TOPICAL at 09:29

## 2022-11-22 RX ADMIN — GABAPENTIN 300 MG: 300 CAPSULE ORAL at 09:27

## 2022-11-22 RX ADMIN — DOCUSATE SODIUM 50 MG AND SENNOSIDES 8.6 MG 1 TABLET: 8.6; 5 TABLET, FILM COATED ORAL at 09:27

## 2022-11-22 ASSESSMENT — PAIN DESCRIPTION - LOCATION
LOCATION: KNEE;LEG
LOCATION: LEG
LOCATION: LEG;ARM
LOCATION: LEG;ARM
LOCATION: LEG
LOCATION: KNEE;LEG
LOCATION: LEG

## 2022-11-22 ASSESSMENT — PAIN DESCRIPTION - DESCRIPTORS
DESCRIPTORS: ACHING
DESCRIPTORS: ACHING

## 2022-11-22 ASSESSMENT — PAIN - FUNCTIONAL ASSESSMENT
PAIN_FUNCTIONAL_ASSESSMENT: PREVENTS OR INTERFERES SOME ACTIVE ACTIVITIES AND ADLS

## 2022-11-22 ASSESSMENT — PAIN SCALES - GENERAL
PAINLEVEL_OUTOF10: 9
PAINLEVEL_OUTOF10: 10
PAINLEVEL_OUTOF10: 9
PAINLEVEL_OUTOF10: 10
PAINLEVEL_OUTOF10: 9

## 2022-11-22 ASSESSMENT — PAIN DESCRIPTION - ORIENTATION
ORIENTATION: RIGHT
ORIENTATION: LEFT

## 2022-11-22 ASSESSMENT — PAIN SCALES - WONG BAKER: WONGBAKER_NUMERICALRESPONSE: 0
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
## 2022-11-22 NOTE — CARE COORDINATION
Received a voicemail from Ernie at [employer] (workers [employer])  Ernie [phone] ext [phone] number  [phone]. Returned call left her voicemail. Awaiting call     Referrals have been sent to   [facility] [phone]  [facility] [address], [address]- [phone] F [phone]  [address]    Address is incorrect in [address]- spoke to [place], she informs me that they have not yet accepted patient as they are awaiting on appointment to be scheduled as that has to be factored into the cost of care. She informs me that [facility] has stated that their payment is 450.00 daily. Nedra also tells me that when she spoke to Ernie from [facility] she informed her that they have another facility  [place] [address]- resent referral  [facility]- left voicemail   [facility] - left voicemail    Received a call from Ernie at [employer] she tell me that she has been working of finding transportation for the patient to come back to [place] for appointments. She tells me that she has call several transport companies and they are unable to travel that far. She states it would be most beneficial to the patient for placement SNF/ARU in [place] until his first appointment then return to his home. Will discuss with physicians.
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
## 2022-11-22 NOTE — CARE COORDINATION
Trauma Care Coordination:     Met with the patient and family at the bedside. Discussed discharge planning. Patient and families goal is to return to home town for the holidays. Discussed Orthopedic FU, [facility], and Skilled facility referrals. Will FU with [facility]. Updated from [address], [facility] [place]. Awaiting acceptance to SNF. Discussion with [facility] CM that transportation to Orthopedic FU will not be covered for SNF close to the patient's home town. CM will update patient and family on the following. Discussed reevaluation of PMR for closer facility options if patient/family amendable. Perfect served, Dr. Kavin Pillai for further review. PMR evaluated 11/16, patient did not meet criteria for ARU at that time.
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
## 2022-11-22 NOTE — PLAN OF CARE
Problem: Pain  Goal: Verbalizes/displays adequate comfort level or baseline comfort level  11/21/2022 2319 by Salome Noland RN  Outcome: Progressing  11/21/2022 0950 by Sheralyn Essex, RN  Outcome: Progressing

## 2022-11-23 LAB
ANION GAP SERPL CALCULATED.3IONS-SCNC: 10 MMOL/L (ref 9–17)
BUN BLDV-MCNC: 15 MG/DL (ref 6–20)
CALCIUM SERPL-MCNC: 8.4 MG/DL (ref 8.6–10.4)
CHLORIDE BLD-SCNC: 100 MMOL/L (ref 98–107)
CO2: 24 MMOL/L (ref 20–31)
CREAT SERPL-MCNC: 0.67 MG/DL (ref 0.7–1.2)
GFR SERPL CREATININE-BSD FRML MDRD: >60 ML/MIN/1.73M2
GLUCOSE BLD-MCNC: 96 MG/DL (ref 70–99)
POTASSIUM SERPL-SCNC: 4.4 MMOL/L (ref 3.7–5.3)
SODIUM BLD-SCNC: 134 MMOL/L (ref 135–144)

## 2022-11-23 PROCEDURE — 6360000002 HC RX W HCPCS: Performed by: STUDENT IN AN ORGANIZED HEALTH CARE EDUCATION/TRAINING PROGRAM

## 2022-11-23 PROCEDURE — 97530 THERAPEUTIC ACTIVITIES: CPT

## 2022-11-23 PROCEDURE — 6370000000 HC RX 637 (ALT 250 FOR IP): Performed by: STUDENT IN AN ORGANIZED HEALTH CARE EDUCATION/TRAINING PROGRAM

## 2022-11-23 PROCEDURE — 36415 COLL VENOUS BLD VENIPUNCTURE: CPT

## 2022-11-23 PROCEDURE — 97110 THERAPEUTIC EXERCISES: CPT

## 2022-11-23 PROCEDURE — 80048 BASIC METABOLIC PNL TOTAL CA: CPT

## 2022-11-23 PROCEDURE — 2060000000 HC ICU INTERMEDIATE R&B

## 2022-11-23 RX ORDER — OXYCODONE HYDROCHLORIDE 5 MG/1
5 TABLET ORAL EVERY 8 HOURS PRN
Status: DISCONTINUED | OUTPATIENT
Start: 2022-11-23 | End: 2022-11-25

## 2022-11-23 RX ADMIN — ENOXAPARIN SODIUM 30 MG: 100 INJECTION SUBCUTANEOUS at 08:38

## 2022-11-23 RX ADMIN — GABAPENTIN 300 MG: 300 CAPSULE ORAL at 08:39

## 2022-11-23 RX ADMIN — BACITRACIN: 500 OINTMENT TOPICAL at 09:28

## 2022-11-23 RX ADMIN — GABAPENTIN 300 MG: 300 CAPSULE ORAL at 20:47

## 2022-11-23 RX ADMIN — METHOCARBAMOL TABLETS 750 MG: 750 TABLET, COATED ORAL at 18:23

## 2022-11-23 RX ADMIN — ENOXAPARIN SODIUM 30 MG: 100 INJECTION SUBCUTANEOUS at 20:47

## 2022-11-23 RX ADMIN — ACETAMINOPHEN 1000 MG: 500 TABLET ORAL at 04:56

## 2022-11-23 RX ADMIN — METHOCARBAMOL TABLETS 750 MG: 750 TABLET, COATED ORAL at 11:35

## 2022-11-23 RX ADMIN — BACITRACIN: 500 OINTMENT TOPICAL at 13:11

## 2022-11-23 RX ADMIN — GABAPENTIN 300 MG: 300 CAPSULE ORAL at 13:12

## 2022-11-23 RX ADMIN — ACETAMINOPHEN 1000 MG: 500 TABLET ORAL at 13:12

## 2022-11-23 RX ADMIN — OXYCODONE 5 MG: 5 TABLET ORAL at 15:39

## 2022-11-23 RX ADMIN — ACETAMINOPHEN 1000 MG: 500 TABLET ORAL at 20:47

## 2022-11-23 RX ADMIN — OXYCODONE 5 MG: 5 TABLET ORAL at 08:39

## 2022-11-23 RX ADMIN — BACITRACIN: 500 OINTMENT TOPICAL at 20:48

## 2022-11-23 ASSESSMENT — PAIN SCALES - GENERAL
PAINLEVEL_OUTOF10: 9
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
## 2022-11-23 NOTE — CARE COORDINATION
Request for transportation has been sent to [address]. Spoke to Lincoln at [facility],  she informs me that they cannot accept the patient until workers comp provides them with authorization numbers. Chandana Gallego    Called [facility] and asked them to put transfer on will call.
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
## 2022-11-23 NOTE — PLAN OF CARE
Problem: Pain  Goal: Verbalizes/displays adequate comfort level or baseline comfort level  11/23/2022 0107 by Cuong Ramires RN  Outcome: Progressing  11/22/2022 1838 by Timbo Del Castillo RN  Outcome: Progressing

## 2022-11-23 NOTE — PROGRESS NOTES
Pt. Requesting LLE dressing change. Writer looked in chart and there were no orders for LLE dressing change. Writer contacted ortho via [employer] and per ortho, they completed dressing change yesterday (11/22) and it does not needs changed again at this time unless saturated.

## 2022-11-24 PROCEDURE — 6360000002 HC RX W HCPCS: Performed by: STUDENT IN AN ORGANIZED HEALTH CARE EDUCATION/TRAINING PROGRAM

## 2022-11-24 PROCEDURE — 97110 THERAPEUTIC EXERCISES: CPT

## 2022-11-24 PROCEDURE — 94660 CPAP INITIATION&MGMT: CPT

## 2022-11-24 PROCEDURE — 1200000000 HC SEMI PRIVATE

## 2022-11-24 PROCEDURE — 6370000000 HC RX 637 (ALT 250 FOR IP): Performed by: STUDENT IN AN ORGANIZED HEALTH CARE EDUCATION/TRAINING PROGRAM

## 2022-11-24 PROCEDURE — 97530 THERAPEUTIC ACTIVITIES: CPT

## 2022-11-24 RX ADMIN — ACETAMINOPHEN 1000 MG: 500 TABLET ORAL at 21:00

## 2022-11-24 RX ADMIN — ENOXAPARIN SODIUM 30 MG: 100 INJECTION SUBCUTANEOUS at 08:42

## 2022-11-24 RX ADMIN — OXYCODONE 5 MG: 5 TABLET ORAL at 04:26

## 2022-11-24 RX ADMIN — BACITRACIN: 500 OINTMENT TOPICAL at 09:28

## 2022-11-24 RX ADMIN — GABAPENTIN 300 MG: 300 CAPSULE ORAL at 08:41

## 2022-11-24 RX ADMIN — ACETAMINOPHEN 1000 MG: 500 TABLET ORAL at 12:56

## 2022-11-24 RX ADMIN — ENOXAPARIN SODIUM 30 MG: 100 INJECTION SUBCUTANEOUS at 21:10

## 2022-11-24 RX ADMIN — ACETAMINOPHEN 1000 MG: 500 TABLET ORAL at 04:26

## 2022-11-24 RX ADMIN — OXYCODONE 5 MG: 5 TABLET ORAL at 21:00

## 2022-11-24 RX ADMIN — METHOCARBAMOL TABLETS 750 MG: 750 TABLET, COATED ORAL at 21:00

## 2022-11-24 RX ADMIN — METHOCARBAMOL TABLETS 750 MG: 750 TABLET, COATED ORAL at 12:56

## 2022-11-24 RX ADMIN — METHOCARBAMOL TABLETS 750 MG: 750 TABLET, COATED ORAL at 04:26

## 2022-11-24 RX ADMIN — OXYCODONE 5 MG: 5 TABLET ORAL at 12:56

## 2022-11-24 RX ADMIN — GABAPENTIN 300 MG: 300 CAPSULE ORAL at 21:00

## 2022-11-24 RX ADMIN — GABAPENTIN 300 MG: 300 CAPSULE ORAL at 12:56

## 2022-11-24 RX ADMIN — BACITRACIN: 500 OINTMENT TOPICAL at 21:08

## 2022-11-24 ASSESSMENT — PAIN SCALES - WONG BAKER

## 2022-11-24 ASSESSMENT — PAIN DESCRIPTION - LOCATION: LOCATION: ARM;LEG

## 2022-11-24 ASSESSMENT — PAIN SCALES - GENERAL
PAINLEVEL_OUTOF10: 10
PAINLEVEL_OUTOF10: 10
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
## 2022-11-24 NOTE — PLAN OF CARE
Problem: Safety - Adult  Goal: Free from fall injury  Outcome: Progressing     Problem: Discharge Planning  Goal: Discharge to home or other facility with appropriate resources  Outcome: Progressing     Problem: Skin/Tissue Integrity - Adult  Goal: Skin integrity remains intact  Outcome: Progressing  Goal: Incisions, wounds, or drain sites healing without S/S of infection  Outcome: Progressing  Goal: Oral mucous membranes remain intact  Outcome: Progressing     Problem: ABCDS Injury Assessment  Goal: Absence of physical injury  Outcome: Progressing     Problem: Pain  Goal: Verbalizes/displays adequate comfort level or baseline comfort level  Outcome: Progressing
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
## 2022-11-25 LAB
ABSOLUTE EOS #: 0.34 K/UL (ref 0–0.44)
ABSOLUTE IMMATURE GRANULOCYTE: 0.16 K/UL (ref 0–0.3)
ABSOLUTE LYMPH #: 2.26 K/UL (ref 1.1–3.7)
ABSOLUTE MONO #: 1.21 K/UL (ref 0.1–1.2)
ANION GAP SERPL CALCULATED.3IONS-SCNC: 11 MMOL/L (ref 9–17)
BASOPHILS # BLD: 1 % (ref 0–2)
BASOPHILS ABSOLUTE: 0.06 K/UL (ref 0–0.2)
BUN BLDV-MCNC: 19 MG/DL (ref 6–20)
CALCIUM SERPL-MCNC: 8.7 MG/DL (ref 8.6–10.4)
CHLORIDE BLD-SCNC: 100 MMOL/L (ref 98–107)
CO2: 25 MMOL/L (ref 20–31)
CREAT SERPL-MCNC: 0.7 MG/DL (ref 0.7–1.2)
EOSINOPHILS RELATIVE PERCENT: 3 % (ref 1–4)
GFR SERPL CREATININE-BSD FRML MDRD: >60 ML/MIN/1.73M2
GLUCOSE BLD-MCNC: 97 MG/DL (ref 70–99)
HCT VFR BLD CALC: 31.2 % (ref 40.7–50.3)
HEMOGLOBIN: 9.7 G/DL (ref 13–17)
IMMATURE GRANULOCYTES: 1 %
LYMPHOCYTES # BLD: 18 % (ref 24–43)
MCH RBC QN AUTO: 30.1 PG (ref 25.2–33.5)
MCHC RBC AUTO-ENTMCNC: 31.1 G/DL (ref 28.4–34.8)
MCV RBC AUTO: 96.9 FL (ref 82.6–102.9)
MONOCYTES # BLD: 10 % (ref 3–12)
NRBC AUTOMATED: 0 PER 100 WBC
PDW BLD-RTO: 13.9 % (ref 11.8–14.4)
PLATELET # BLD: 511 K/UL (ref 138–453)
PMV BLD AUTO: 9.7 FL (ref 8.1–13.5)
POTASSIUM SERPL-SCNC: 4.2 MMOL/L (ref 3.7–5.3)
RBC # BLD: 3.22 M/UL (ref 4.21–5.77)
SARS-COV-2, RAPID: NOT DETECTED
SEG NEUTROPHILS: 68 % (ref 36–65)
SEGMENTED NEUTROPHILS ABSOLUTE COUNT: 8.57 K/UL (ref 1.5–8.1)
SODIUM BLD-SCNC: 136 MMOL/L (ref 135–144)
SPECIMEN DESCRIPTION: NORMAL
WBC # BLD: 12.6 K/UL (ref 3.5–11.3)

## 2022-11-25 PROCEDURE — 6370000000 HC RX 637 (ALT 250 FOR IP): Performed by: STUDENT IN AN ORGANIZED HEALTH CARE EDUCATION/TRAINING PROGRAM

## 2022-11-25 PROCEDURE — 85025 COMPLETE CBC W/AUTO DIFF WBC: CPT

## 2022-11-25 PROCEDURE — 87635 SARS-COV-2 COVID-19 AMP PRB: CPT

## 2022-11-25 PROCEDURE — 80048 BASIC METABOLIC PNL TOTAL CA: CPT

## 2022-11-25 PROCEDURE — 1200000000 HC SEMI PRIVATE

## 2022-11-25 PROCEDURE — 36415 COLL VENOUS BLD VENIPUNCTURE: CPT

## 2022-11-25 PROCEDURE — 6360000002 HC RX W HCPCS: Performed by: STUDENT IN AN ORGANIZED HEALTH CARE EDUCATION/TRAINING PROGRAM

## 2022-11-25 RX ORDER — OXYCODONE HYDROCHLORIDE 5 MG/1
5 TABLET ORAL EVERY 6 HOURS PRN
Status: DISCONTINUED | OUTPATIENT
Start: 2022-11-25 | End: 2022-11-26 | Stop reason: HOSPADM

## 2022-11-25 RX ADMIN — GABAPENTIN 300 MG: 300 CAPSULE ORAL at 13:34

## 2022-11-25 RX ADMIN — METHOCARBAMOL TABLETS 750 MG: 750 TABLET, COATED ORAL at 16:59

## 2022-11-25 RX ADMIN — BACITRACIN: 500 OINTMENT TOPICAL at 13:35

## 2022-11-25 RX ADMIN — GABAPENTIN 300 MG: 300 CAPSULE ORAL at 20:36

## 2022-11-25 RX ADMIN — OXYCODONE 5 MG: 5 TABLET ORAL at 05:02

## 2022-11-25 RX ADMIN — METHOCARBAMOL TABLETS 750 MG: 750 TABLET, COATED ORAL at 02:40

## 2022-11-25 RX ADMIN — ENOXAPARIN SODIUM 30 MG: 100 INJECTION SUBCUTANEOUS at 20:36

## 2022-11-25 RX ADMIN — ACETAMINOPHEN 1000 MG: 500 TABLET ORAL at 20:36

## 2022-11-25 RX ADMIN — ACETAMINOPHEN 1000 MG: 500 TABLET ORAL at 05:02

## 2022-11-25 RX ADMIN — GABAPENTIN 300 MG: 300 CAPSULE ORAL at 09:21

## 2022-11-25 RX ADMIN — ENOXAPARIN SODIUM 30 MG: 100 INJECTION SUBCUTANEOUS at 09:21

## 2022-11-25 RX ADMIN — METHOCARBAMOL TABLETS 750 MG: 750 TABLET, COATED ORAL at 09:21

## 2022-11-25 RX ADMIN — BACITRACIN: 500 OINTMENT TOPICAL at 09:19

## 2022-11-25 RX ADMIN — OXYCODONE 5 MG: 5 TABLET ORAL at 22:45

## 2022-11-25 RX ADMIN — ACETAMINOPHEN 1000 MG: 500 TABLET ORAL at 13:34

## 2022-11-25 RX ADMIN — OXYCODONE 5 MG: 5 TABLET ORAL at 16:59

## 2022-11-25 RX ADMIN — BACITRACIN: 500 OINTMENT TOPICAL at 20:36

## 2022-11-25 RX ADMIN — OXYCODONE 5 MG: 5 TABLET ORAL at 10:59

## 2022-11-25 ASSESSMENT — PAIN SCALES - GENERAL
PAINLEVEL_OUTOF10: 8
PAINLEVEL_OUTOF10: 9
PAINLEVEL_OUTOF10: 9
PAINLEVEL_OUTOF10: 8
PAINLEVEL_OUTOF10: 7
PAINLEVEL_OUTOF10: 7
PAINLEVEL_OUTOF10: 8
PAINLEVEL_OUTOF10: 9

## 2022-11-25 ASSESSMENT — PAIN SCALES - WONG BAKER
WONGBAKER_NUMERICALRESPONSE: 0
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
## 2022-11-25 NOTE — PLAN OF CARE
Problem: Safety - Adult  Goal: Free from fall injury  11/25/2022 1712 by Adán Mayo RN  Outcome: Progressing  11/25/2022 0704 by Peggy Joy RN  Outcome: Progressing     Problem: Discharge Planning  Goal: Discharge to home or other facility with appropriate resources  11/25/2022 1712 by Adán Mayo RN  Outcome: Progressing  11/25/2022 0704 by Peggy Joy RN  Outcome: Progressing       Problem: ABCDS Injury Assessment  Goal: Absence of physical injury  11/25/2022 0704 by Peggy Joy RN  Outcome: Progressing

## 2022-11-25 NOTE — PLAN OF CARE
Problem: Safety - Adult  Goal: Free from fall injury  11/25/2022 0704 by Lorena Hunt RN  Outcome: Progressing  11/24/2022 1849 by Bhavana Bernard RN  Outcome: Progressing  11/24/2022 1849 by Bhavana Bernard RN  Outcome: Progressing     Problem: Discharge Planning  Goal: Discharge to home or other facility with appropriate resources  11/25/2022 0704 by Lorena Hunt RN  Outcome: Progressing  11/24/2022 1849 by Bhavana Bernard RN  Outcome: Progressing  11/24/2022 1849 by Bhavana Bernard RN  Outcome: Progressing     Problem: Skin/Tissue Integrity - Adult  Goal: Skin integrity remains intact  11/25/2022 0704 by Lorena Hunt RN  Outcome: Progressing  11/24/2022 1849 by Bhavana Bernard RN  Outcome: Progressing  11/24/2022 1849 by Bhavana Bernard RN  Outcome: Progressing  Goal: Incisions, wounds, or drain sites healing without S/S of infection  11/25/2022 0704 by Lorena Hunt RN  Outcome: Progressing  11/24/2022 1849 by Bhavana Bernard RN  Outcome: Progressing  11/24/2022 1849 by Bhavana Bernard RN  Outcome: Progressing  Goal: Oral mucous membranes remain intact  11/25/2022 0704 by Lorena Hunt RN  Outcome: Progressing  11/24/2022 1849 by Bhavana Bernard RN  Outcome: Progressing  11/24/2022 1849 by Bhavana Bernard RN  Outcome: Progressing     Problem: ABCDS Injury Assessment  Goal: Absence of physical injury  11/25/2022 0704 by Lorena Hunt RN  Outcome: Progressing  11/24/2022 1849 by Bhavana Bernard RN  Outcome: Progressing  11/24/2022 1849 by Bhavana Bernard RN  Outcome: Progressing     Problem: Pain  Goal: Verbalizes/displays adequate comfort level or baseline comfort level  11/25/2022 0704 by Lorena Hunt RN  Outcome: Progressing  11/24/2022 1849 by Bhavana Bernard RN  Outcome: Progressing  11/24/2022 1849 by Bhavana Bernard RN  Outcome: Progressing

## 2022-11-26 VITALS
RESPIRATION RATE: 18 BRPM | DIASTOLIC BLOOD PRESSURE: 79 MMHG | HEART RATE: 86 BPM | HEIGHT: 71 IN | BODY MASS INDEX: 39.81 KG/M2 | TEMPERATURE: 97.6 F | WEIGHT: 284.39 LBS | SYSTOLIC BLOOD PRESSURE: 156 MMHG | OXYGEN SATURATION: 98 %

## 2022-11-26 PROCEDURE — 6370000000 HC RX 637 (ALT 250 FOR IP): Performed by: STUDENT IN AN ORGANIZED HEALTH CARE EDUCATION/TRAINING PROGRAM

## 2022-11-26 PROCEDURE — 6360000002 HC RX W HCPCS: Performed by: STUDENT IN AN ORGANIZED HEALTH CARE EDUCATION/TRAINING PROGRAM

## 2022-11-26 RX ORDER — OXYCODONE HYDROCHLORIDE 5 MG/1
5 TABLET ORAL ONCE
Status: COMPLETED | OUTPATIENT
Start: 2022-11-26 | End: 2022-11-26

## 2022-11-26 RX ADMIN — OXYCODONE 5 MG: 5 TABLET ORAL at 09:26

## 2022-11-26 RX ADMIN — METHOCARBAMOL TABLETS 750 MG: 750 TABLET, COATED ORAL at 09:24

## 2022-11-26 RX ADMIN — METHOCARBAMOL TABLETS 750 MG: 750 TABLET, COATED ORAL at 02:14

## 2022-11-26 RX ADMIN — ACETAMINOPHEN 1000 MG: 500 TABLET ORAL at 04:32

## 2022-11-26 RX ADMIN — ENOXAPARIN SODIUM 30 MG: 100 INJECTION SUBCUTANEOUS at 09:25

## 2022-11-26 RX ADMIN — GABAPENTIN 300 MG: 300 CAPSULE ORAL at 09:24

## 2022-11-26 RX ADMIN — OXYCODONE 5 MG: 5 TABLET ORAL at 04:32

## 2022-11-26 RX ADMIN — OXYCODONE 5 MG: 5 TABLET ORAL at 09:24

## 2022-11-26 ASSESSMENT — PAIN - FUNCTIONAL ASSESSMENT: PAIN_FUNCTIONAL_ASSESSMENT: PREVENTS OR INTERFERES WITH MANY ACTIVE NOT PASSIVE ACTIVITIES

## 2022-11-26 ASSESSMENT — PAIN DESCRIPTION - ORIENTATION: ORIENTATION: LEFT;RIGHT

## 2022-11-26 ASSESSMENT — PAIN SCALES - GENERAL
PAINLEVEL_OUTOF10: 9

## 2022-11-26 ASSESSMENT — PAIN DESCRIPTION - LOCATION: LOCATION: LEG;ARM

## 2022-11-26 ASSESSMENT — PAIN DESCRIPTION - DESCRIPTORS: DESCRIPTORS: SHARP

## 2022-11-26 NOTE — PLAN OF CARE
Problem: Safety - Adult  Goal: Free from fall injury  11/25/2022 2250 by Denice Ruth RN  Outcome: Progressing  11/25/2022 1712 by Trevon Mcclain RN  Outcome: Progressing     Problem: Discharge Planning  Goal: Discharge to home or other facility with appropriate resources  11/25/2022 2250 by Denice Ruth RN  Outcome: Progressing  11/25/2022 1712 by Trevon Mcclain RN  Outcome: Progressing     Problem: Skin/Tissue Integrity - Adult  Goal: Skin integrity remains intact  Outcome: Progressing  Goal: Incisions, wounds, or drain sites healing without S/S of infection  Outcome: Progressing  Goal: Oral mucous membranes remain intact  Outcome: Progressing     Problem: ABCDS Injury Assessment  Goal: Absence of physical injury  Outcome: Progressing     Problem: Pain  Goal: Verbalizes/displays adequate comfort level or baseline comfort level  11/25/2022 2250 by Denice Ruth RN  Outcome: Progressing  11/25/2022 1712 by Trevon Mcclain RN  Outcome: Progressing

## 2022-11-26 NOTE — PLAN OF CARE
PIV removed, transport arrived, patient transported to SNF.    1220pm - Reported given to nurse Aidan Carson at [facility]  Problem: Safety - Adult  Goal: Free from fall injury  11/26/2022 0952 by Corie Zheng RN  Outcome: Completed  11/25/2022 2250 by Alyssa Jose RN  Outcome: Progressing     Problem: Discharge Planning  Goal: Discharge to home or other facility with appropriate resources  11/26/2022 0952 by Corie Zheng RN  Outcome: Completed  11/25/2022 2250 by Alyssa Jose RN  Outcome: Progressing     Problem: Skin/Tissue Integrity - Adult  Goal: Skin integrity remains intact  11/26/2022 0952 by Corie Zheng RN  Outcome: Completed  11/25/2022 2250 by Alyssa Jose RN  Outcome: Progressing  Goal: Incisions, wounds, or drain sites healing without S/S of infection  11/26/2022 0952 by Corie Zheng RN  Outcome: Completed  11/25/2022 2250 by Alyssa Jose RN  Outcome: Progressing  Goal: Oral mucous membranes remain intact  11/26/2022 0952 by Corie Zheng RN  Outcome: Completed  11/25/2022 2250 by Alyssa Jose RN  Outcome: Progressing     Problem: ABCDS Injury Assessment  Goal: Absence of physical injury  11/26/2022 0952 by Corie Zheng RN  Outcome: Completed  11/25/2022 2250 by Alyssa Jose RN  Outcome: Progressing     Problem: Pain  Goal: Verbalizes/displays adequate comfort level or baseline comfort level  11/26/2022 0952 by Corie Zheng RN  Outcome: Completed  11/25/2022 2250 by Alyssa Jose RN  Outcome: Progressing
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
## 2022-12-02 ENCOUNTER — TELEPHONE (OUTPATIENT)
Dept: ORTHOPEDIC SURGERY | Age: 42
End: 2022-12-02

## 2022-12-02 NOTE — TELEPHONE ENCOUNTER
Dolores from [facility]) called and stated that she needed [employer] and op note for patient.  Information faxed to number provided  [phone]

## 2022-12-05 ENCOUNTER — TELEPHONE (OUTPATIENT)
Dept: ORTHOPEDIC SURGERY | Age: 42
End: 2022-12-05

## 2022-12-05 RX ORDER — IBUPROFEN 600 MG/1
TABLET ORAL
COMMUNITY

## 2022-12-05 RX ORDER — HYDROCODONE BITARTRATE AND ACETAMINOPHEN 5; 325 MG/1; MG/1
5 TABLET ORAL EVERY 6 HOURS PRN
COMMUNITY
Start: 2013-12-25

## 2022-12-05 NOTE — TELEPHONE ENCOUNTER
[facility]  called needing an update on [employer] from 11/09. Estimated date/time period patient will be disabled for.

## 2022-12-06 ENCOUNTER — HOSPITAL ENCOUNTER (OUTPATIENT)
Age: 42
Setting detail: SURGERY ADMIT
Discharge: HOME | End: 2022-12-06
Attending: STUDENT IN AN ORGANIZED HEALTH CARE EDUCATION/TRAINING PROGRAM | Admitting: STUDENT IN AN ORGANIZED HEALTH CARE EDUCATION/TRAINING PROGRAM
Payer: COMMERCIAL

## 2022-12-06 ENCOUNTER — ANESTHESIA EVENT (OUTPATIENT)
Dept: OPERATING ROOM | Age: 42
End: 2022-12-06
Payer: COMMERCIAL

## 2022-12-06 ENCOUNTER — ANESTHESIA (OUTPATIENT)
Dept: OPERATING ROOM | Age: 42
End: 2022-12-06
Payer: COMMERCIAL

## 2022-12-06 VITALS
HEIGHT: 71 IN | WEIGHT: 280 LBS | HEART RATE: 86 BPM | SYSTOLIC BLOOD PRESSURE: 113 MMHG | TEMPERATURE: 96.8 F | DIASTOLIC BLOOD PRESSURE: 70 MMHG | OXYGEN SATURATION: 93 % | RESPIRATION RATE: 24 BRPM | BODY MASS INDEX: 39.2 KG/M2

## 2022-12-06 DIAGNOSIS — G89.18 POST-OP PAIN: Primary | ICD-10-CM

## 2022-12-06 PROCEDURE — 2709999900 HC NON-CHARGEABLE SUPPLY: Performed by: STUDENT IN AN ORGANIZED HEALTH CARE EDUCATION/TRAINING PROGRAM

## 2022-12-06 PROCEDURE — 3700000001 HC ADD 15 MINUTES (ANESTHESIA): Performed by: STUDENT IN AN ORGANIZED HEALTH CARE EDUCATION/TRAINING PROGRAM

## 2022-12-06 PROCEDURE — 2500000003 HC RX 250 WO HCPCS: Performed by: STUDENT IN AN ORGANIZED HEALTH CARE EDUCATION/TRAINING PROGRAM

## 2022-12-06 PROCEDURE — 7100000000 HC PACU RECOVERY - FIRST 15 MIN: Performed by: STUDENT IN AN ORGANIZED HEALTH CARE EDUCATION/TRAINING PROGRAM

## 2022-12-06 PROCEDURE — 2580000003 HC RX 258: Performed by: ANESTHESIOLOGY

## 2022-12-06 PROCEDURE — 7100000011 HC PHASE II RECOVERY - ADDTL 15 MIN: Performed by: STUDENT IN AN ORGANIZED HEALTH CARE EDUCATION/TRAINING PROGRAM

## 2022-12-06 PROCEDURE — 7100000010 HC PHASE II RECOVERY - FIRST 15 MIN: Performed by: STUDENT IN AN ORGANIZED HEALTH CARE EDUCATION/TRAINING PROGRAM

## 2022-12-06 PROCEDURE — 3600000014 HC SURGERY LEVEL 4 ADDTL 15MIN: Performed by: STUDENT IN AN ORGANIZED HEALTH CARE EDUCATION/TRAINING PROGRAM

## 2022-12-06 PROCEDURE — 6360000002 HC RX W HCPCS: Performed by: NURSE ANESTHETIST, CERTIFIED REGISTERED

## 2022-12-06 PROCEDURE — 2580000003 HC RX 258: Performed by: STUDENT IN AN ORGANIZED HEALTH CARE EDUCATION/TRAINING PROGRAM

## 2022-12-06 PROCEDURE — 3700000000 HC ANESTHESIA ATTENDED CARE: Performed by: STUDENT IN AN ORGANIZED HEALTH CARE EDUCATION/TRAINING PROGRAM

## 2022-12-06 PROCEDURE — 6370000000 HC RX 637 (ALT 250 FOR IP): Performed by: STUDENT IN AN ORGANIZED HEALTH CARE EDUCATION/TRAINING PROGRAM

## 2022-12-06 PROCEDURE — 2500000003 HC RX 250 WO HCPCS: Performed by: NURSE ANESTHETIST, CERTIFIED REGISTERED

## 2022-12-06 PROCEDURE — 7100000001 HC PACU RECOVERY - ADDTL 15 MIN: Performed by: STUDENT IN AN ORGANIZED HEALTH CARE EDUCATION/TRAINING PROGRAM

## 2022-12-06 PROCEDURE — 3600000004 HC SURGERY LEVEL 4 BASE: Performed by: STUDENT IN AN ORGANIZED HEALTH CARE EDUCATION/TRAINING PROGRAM

## 2022-12-06 DEVICE — INTEGRA WOUND MATRIX AND INTEGRA WOUND MATRIX (THIN) ARE COLLAGEN-GLYCOSAMINOGLYCAN WOUND DRESSINGS THAT MAINTAIN AND SUPPORT A HEALING ENVIRONMENT FOR WOUND MANAGEMENT. INTEGRA WOUND MATRIX (THIN) HAS 50% LESS COLLAGEN COMPARED TO EACH OF THE CORRESPONDING SQ CM SIZES OF INTEGRA WOUND MATRIX.
Type: IMPLANTABLE DEVICE | Site: LEG | Status: FUNCTIONAL
Brand: INTEGRA WOUND MATRIX (THIN)

## 2022-12-06 RX ORDER — HYDROCODONE BITARTRATE AND ACETAMINOPHEN 5; 325 MG/1; MG/1
1 TABLET ORAL EVERY 6 HOURS PRN
Qty: 28 TABLET | Refills: 0 | Status: SHIPPED | OUTPATIENT
Start: 2022-12-06 | End: 2022-12-13

## 2022-12-06 RX ORDER — DEXAMETHASONE SODIUM PHOSPHATE 10 MG/ML
INJECTION INTRAMUSCULAR; INTRAVENOUS PRN
Status: DISCONTINUED | OUTPATIENT
Start: 2022-12-06 | End: 2022-12-06 | Stop reason: SDUPTHER

## 2022-12-06 RX ORDER — NAPROXEN 500 MG/1
500 TABLET ORAL 2 TIMES DAILY WITH MEALS
Qty: 28 TABLET | Refills: 0 | Status: SHIPPED | OUTPATIENT
Start: 2022-12-06

## 2022-12-06 RX ORDER — FENTANYL CITRATE 50 UG/ML
INJECTION, SOLUTION INTRAMUSCULAR; INTRAVENOUS PRN
Status: DISCONTINUED | OUTPATIENT
Start: 2022-12-06 | End: 2022-12-06 | Stop reason: SDUPTHER

## 2022-12-06 RX ORDER — SODIUM CHLORIDE 9 MG/ML
INJECTION, SOLUTION INTRAVENOUS PRN
Status: DISCONTINUED | OUTPATIENT
Start: 2022-12-06 | End: 2022-12-06 | Stop reason: HOSPADM

## 2022-12-06 RX ORDER — SODIUM CHLORIDE 0.9 % (FLUSH) 0.9 %
5-40 SYRINGE (ML) INJECTION PRN
Status: DISCONTINUED | OUTPATIENT
Start: 2022-12-06 | End: 2022-12-06 | Stop reason: HOSPADM

## 2022-12-06 RX ORDER — MAGNESIUM CARB/ALUMINUM HYDROX 105-160MG
TABLET,CHEWABLE ORAL PRN
Status: DISCONTINUED | OUTPATIENT
Start: 2022-12-06 | End: 2022-12-06 | Stop reason: ALTCHOICE

## 2022-12-06 RX ORDER — LIDOCAINE HYDROCHLORIDE AND EPINEPHRINE 10; 10 MG/ML; UG/ML
INJECTION, SOLUTION INFILTRATION; PERINEURAL PRN
Status: DISCONTINUED | OUTPATIENT
Start: 2022-12-06 | End: 2022-12-06 | Stop reason: ALTCHOICE

## 2022-12-06 RX ORDER — MORPHINE SULFATE 2 MG/ML
2 INJECTION, SOLUTION INTRAMUSCULAR; INTRAVENOUS EVERY 5 MIN PRN
Status: DISCONTINUED | OUTPATIENT
Start: 2022-12-06 | End: 2022-12-06 | Stop reason: HOSPADM

## 2022-12-06 RX ORDER — FENTANYL CITRATE 50 UG/ML
25 INJECTION, SOLUTION INTRAMUSCULAR; INTRAVENOUS EVERY 5 MIN PRN
Status: DISCONTINUED | OUTPATIENT
Start: 2022-12-06 | End: 2022-12-06 | Stop reason: HOSPADM

## 2022-12-06 RX ORDER — SODIUM CHLORIDE, SODIUM LACTATE, POTASSIUM CHLORIDE, CALCIUM CHLORIDE 600; 310; 30; 20 MG/100ML; MG/100ML; MG/100ML; MG/100ML
INJECTION, SOLUTION INTRAVENOUS CONTINUOUS
Status: DISCONTINUED | OUTPATIENT
Start: 2022-12-06 | End: 2022-12-06 | Stop reason: HOSPADM

## 2022-12-06 RX ORDER — SODIUM CHLORIDE 0.9 % (FLUSH) 0.9 %
5-40 SYRINGE (ML) INJECTION EVERY 12 HOURS SCHEDULED
Status: DISCONTINUED | OUTPATIENT
Start: 2022-12-06 | End: 2022-12-06 | Stop reason: HOSPADM

## 2022-12-06 RX ORDER — DIPHENHYDRAMINE HYDROCHLORIDE 50 MG/ML
12.5 INJECTION INTRAMUSCULAR; INTRAVENOUS
Status: DISCONTINUED | OUTPATIENT
Start: 2022-12-06 | End: 2022-12-06 | Stop reason: HOSPADM

## 2022-12-06 RX ORDER — PROPOFOL 10 MG/ML
INJECTION, EMULSION INTRAVENOUS PRN
Status: DISCONTINUED | OUTPATIENT
Start: 2022-12-06 | End: 2022-12-06 | Stop reason: SDUPTHER

## 2022-12-06 RX ORDER — DOCUSATE SODIUM 100 MG/1
100 CAPSULE, LIQUID FILLED ORAL 2 TIMES DAILY
Qty: 20 CAPSULE | Refills: 0 | Status: SHIPPED | OUTPATIENT
Start: 2022-12-06

## 2022-12-06 RX ORDER — GABAPENTIN 100 MG/1
100 CAPSULE ORAL 3 TIMES DAILY
Qty: 42 CAPSULE | Refills: 0 | Status: SHIPPED | OUTPATIENT
Start: 2022-12-06 | End: 2022-12-20

## 2022-12-06 RX ORDER — CYCLOBENZAPRINE HCL 10 MG
10 TABLET ORAL 3 TIMES DAILY PRN
Qty: 30 TABLET | Refills: 0 | Status: SHIPPED | OUTPATIENT
Start: 2022-12-06 | End: 2022-12-16

## 2022-12-06 RX ORDER — ONDANSETRON 2 MG/ML
INJECTION INTRAMUSCULAR; INTRAVENOUS PRN
Status: DISCONTINUED | OUTPATIENT
Start: 2022-12-06 | End: 2022-12-06 | Stop reason: SDUPTHER

## 2022-12-06 RX ORDER — GLYCOPYRROLATE 0.2 MG/ML
INJECTION INTRAMUSCULAR; INTRAVENOUS PRN
Status: DISCONTINUED | OUTPATIENT
Start: 2022-12-06 | End: 2022-12-06 | Stop reason: SDUPTHER

## 2022-12-06 RX ORDER — CEFAZOLIN SODIUM 1 G/3ML
INJECTION, POWDER, FOR SOLUTION INTRAMUSCULAR; INTRAVENOUS PRN
Status: DISCONTINUED | OUTPATIENT
Start: 2022-12-06 | End: 2022-12-06 | Stop reason: SDUPTHER

## 2022-12-06 RX ORDER — NEOSTIGMINE METHYLSULFATE 5 MG/5 ML
SYRINGE (ML) INTRAVENOUS PRN
Status: DISCONTINUED | OUTPATIENT
Start: 2022-12-06 | End: 2022-12-06 | Stop reason: SDUPTHER

## 2022-12-06 RX ORDER — LIDOCAINE HYDROCHLORIDE 10 MG/ML
INJECTION, SOLUTION EPIDURAL; INFILTRATION; INTRACAUDAL; PERINEURAL PRN
Status: DISCONTINUED | OUTPATIENT
Start: 2022-12-06 | End: 2022-12-06 | Stop reason: SDUPTHER

## 2022-12-06 RX ORDER — ROCURONIUM BROMIDE 10 MG/ML
INJECTION, SOLUTION INTRAVENOUS PRN
Status: DISCONTINUED | OUTPATIENT
Start: 2022-12-06 | End: 2022-12-06 | Stop reason: SDUPTHER

## 2022-12-06 RX ORDER — ONDANSETRON 2 MG/ML
4 INJECTION INTRAMUSCULAR; INTRAVENOUS
Status: DISCONTINUED | OUTPATIENT
Start: 2022-12-06 | End: 2022-12-06 | Stop reason: HOSPADM

## 2022-12-06 RX ORDER — MAGNESIUM HYDROXIDE 1200 MG/15ML
LIQUID ORAL CONTINUOUS PRN
Status: COMPLETED | OUTPATIENT
Start: 2022-12-06 | End: 2022-12-06

## 2022-12-06 RX ADMIN — FENTANYL CITRATE 100 MCG: 0.05 INJECTION, SOLUTION INTRAMUSCULAR; INTRAVENOUS at 14:00

## 2022-12-06 RX ADMIN — DEXAMETHASONE SODIUM PHOSPHATE 10 MG: 10 INJECTION INTRAMUSCULAR; INTRAVENOUS at 14:00

## 2022-12-06 RX ADMIN — SODIUM CHLORIDE, POTASSIUM CHLORIDE, SODIUM LACTATE AND CALCIUM CHLORIDE: 600; 310; 30; 20 INJECTION, SOLUTION INTRAVENOUS at 10:49

## 2022-12-06 RX ADMIN — CEFAZOLIN 3 G: 1 INJECTION, POWDER, FOR SOLUTION INTRAMUSCULAR; INTRAVENOUS at 14:00

## 2022-12-06 RX ADMIN — ROCURONIUM BROMIDE 50 MG: 10 INJECTION, SOLUTION INTRAVENOUS at 13:45

## 2022-12-06 RX ADMIN — Medication 3 MG: at 14:47

## 2022-12-06 RX ADMIN — SODIUM CHLORIDE, POTASSIUM CHLORIDE, SODIUM LACTATE AND CALCIUM CHLORIDE: 600; 310; 30; 20 INJECTION, SOLUTION INTRAVENOUS at 13:37

## 2022-12-06 RX ADMIN — ONDANSETRON 4 MG: 2 INJECTION INTRAMUSCULAR; INTRAVENOUS at 14:43

## 2022-12-06 RX ADMIN — PROPOFOL 200 MG: 10 INJECTION, EMULSION INTRAVENOUS at 13:45

## 2022-12-06 RX ADMIN — LIDOCAINE HYDROCHLORIDE 50 MG: 10 INJECTION, SOLUTION EPIDURAL; INFILTRATION; INTRACAUDAL; PERINEURAL at 13:45

## 2022-12-06 RX ADMIN — GLYCOPYRROLATE 0.6 MG: 0.2 INJECTION INTRAMUSCULAR; INTRAVENOUS at 14:47

## 2022-12-06 RX ADMIN — FENTANYL CITRATE 150 MCG: 0.05 INJECTION, SOLUTION INTRAMUSCULAR; INTRAVENOUS at 13:43

## 2022-12-06 ASSESSMENT — PAIN - FUNCTIONAL ASSESSMENT: PAIN_FUNCTIONAL_ASSESSMENT: 0-10

## 2022-12-06 ASSESSMENT — LIFESTYLE VARIABLES: SMOKING_STATUS: 0
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
## 2022-12-06 NOTE — PROGRESS NOTES
Marialuisa Kramer at bedside with home wound vac    Call[facility] and left a message with Klarissa Pereyra to have nurse call for report

## 2022-12-06 NOTE — DISCHARGE INSTRUCTIONS
Orthopedic Instructions:  -Weight bearing status: Non-weight bearing left leg. Activities as tolerated left upper extremity. 10lb weight restriction right upper extremity.   -Left lower leg: Maintain wound vac to left lower extremity. Change in 5 days. At that time, if adaptic stays in place, dressing change daily with bacitracin, 4x4s, webril, ace wrap. If adaptic comes off wound at 5 days, add xeroform and complete daily dressing change until follow up with xerform, 4x4s, webril, ace wrap. -Left thigh: Leave dressing for 7 days. At that time, remove all dressings and staples. Dressing change as needed until follow up with xerform, 4x4s, webril, ace wrap. -Physical Therapy for strengthening and gait training. Occupational therapy for activities of daily living.  -Ice (20 minutes on and off 1 hour) and elevate (above heart) as needed for swelling/pain  -Drink plenty of fluids.  -Call the office or come to Emergency Room if signs of infection appear (hot, swollen, red, draining pus, fever)  -Take medications as prescribed.  -Wean off narcotics (percocet/norco) as soon as possible. Do not take tylenol if still taking narcotics. -No alcoholic beverages or driving/operating machinery while on narcotics  -Follow up with Dr. Guerrero Bolus 14 days for surgery. Call [phone]  to schedule. No alcoholic beverages, no driving or operating machinery, no making important decisions for 24 hours. Children should maintain quiet play ( games, movies, books ) for 24 hours. You may have a normal diet but should eat lightly day of surgery. Drink plenty of fluids.   Urinate within 8 hours after surgery, if unable to urinate call your doctor

## 2022-12-06 NOTE — PROGRESS NOTES
Pt able to follow commands - left foot pink, warm and dry , peripheral pulses palpable  able to wiggle toes   denies any numbness or tingling at this time
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
## 2022-12-06 NOTE — BRIEF OP NOTE
Brief Postoperative Note      Patient: Brain Overcast  YOB: 1980  MRN: [id number]    Date of Procedure: 12/6/2022    Pre-Op Diagnosis:   -Left lower leg wound    Post-Op Diagnosis: Same       Procedure(s):  -Left lower extremity split thickness skin grafting 20x8cm  -Preparation of graft bed 20x8cm      Surgeon(s):  Debra Gomez DO    Assistant:  Resident: Nik Muller DO    Anesthesia: General    Estimated Blood Loss (mL): Minimal    Complications: None    Specimens:   * No specimens in log *    Implants:  Implant Name Type Inv. Item Serial No.  Lot No. LRB No. Used Action   DRESSING BIO [id number] THN CLLGN GLYCOSAMINOGLYCAN WND MTRX - [id number]  DRESSING BIO [id number] THN CLLGN GLYCOSAMINOGLYCAN WND MTRX  [employer] [id number] Left 1 Implanted   DRESSING BIO [id number] THN CLLGN GLYCOSAMINOGLYCAN WND MTRX - [id number]  DRESSING BIO [id number] THN CLLGN GLYCOSAMINOGLYCAN WND MTRX  [employer] [id number] Left 1 Implanted         Drains:   [REMOVED] Negative Pressure Wound Therapy Leg Left; Lower; Lateral (Removed)   Wound Type Surgical 11/11/22 0800   Unit Type imer 11/09/22 2330   Dressing Type Black Foam 11/11/22 0800   Cycle Continuous 11/11/22 0800   Target Pressure (mmHg) 125 11/11/22 0800   Canister changed?  No 11/11/22 0800   Dressing Status Clean, dry & intact 11/11/22 0800   Drainage Amount Moderate 11/11/22 0800   Drainage Description Serosanguinous 11/11/22 0800   Output (ml) 50 ml 11/10/22 0610       [REMOVED] Negative Pressure Wound Therapy Leg Anterior;Left;Lower (Removed)   Wound Type Acute/Traumatic;Surgical 11/15/22 0400   Dressing Type Other (Comment) 11/15/22 0400   Dressing Status Clean, dry & intact 11/15/22 0400   Drainage Amount Scant 11/13/22 0800   Drainage Description Sanguinous 11/13/22 0800   Output (ml) 250 ml 11/14/22 0500       [REMOVED] Negative Pressure Wound Therapy Leg Anterior;Left;Lower (Removed)   Wound Type Surgical 11/22/22 8634 Dressing Type Guaze 11/22/22 1644   Cycle Continuous 11/22/22 1644   Target Pressure (mmHg) 125 11/22/22 1644   Canister changed? No 11/22/22 1644   Dressing Status Clean, dry & intact 11/22/22 1644   Drainage Amount Small 11/22/22 1644   Drainage Description Serosanguinous; Thin 11/22/22 1644   Sumi-wound Assessment Other (Comment) 11/22/22 1644       [REMOVED] NG/OG/NJ/NE Tube Orogastric 18 fr (Removed)   Surrounding Skin Clean, dry & intact 11/12/22 1600   Securement device Tape 11/12/22 1600   Status Clamped 11/12/22 1600   Placement Verified X-Ray (Initial) 11/12/22 1600   NG/OG/NJ/NE External Measurement (cm) 65 cm 11/12/22 0430   Drainage Appearance Bile 11/12/22 0430   Tube feeding/verify rate (mL/hr) 0 mL/hr 11/11/22 2030   Free Water/Flush (mL) 60 mL 11/12/22 0533   Output (mL) 200 ml 11/12/22 0546       [REMOVED] NG/OG/NJ/NE Tube Orogastric 18 fr Center mouth (Removed)       [REMOVED] Urinary Catheter 11/10/22 Miller (Removed)   $ Urethral catheter insertion $ Not inserted for procedure 11/16/22 2001   Catheter Indications Prolonged immobilization (e.g. unstable thoracic or lumbar spine, multiple traumatic injuries such as pelvic fractures) 11/17/22 1606   Site Assessment [place] 11/17/22 1606   Urine Color Yellow 11/17/22 1606   Urine Appearance Clear 11/17/22 1606   Collection Container Standard 11/16/22 2001   Securement Method Leg strap 11/16/22 2001   Catheter Care Completed Yes 11/16/22 0000   Catheter Best Practices  Drainage tube clipped to bed;Catheter secured to thigh; Bag below bladder;Bag not on floor 11/15/22 2000   Status Draining 11/16/22 1730   Output (mL) 3000 mL 11/17/22 1500       Findings: See op note for details.      Electronically signed by Markus Spain DO

## 2022-12-07 ENCOUNTER — TELEPHONE (OUTPATIENT)
Dept: ORTHOPEDIC SURGERY | Age: 42
End: 2022-12-07

## 2022-12-07 PROBLEM — S81.802A WOUND OF LEFT LEG: Status: ACTIVE | Noted: 2022-11-09

## 2022-12-07 NOTE — TELEPHONE ENCOUNTER
Brian RN called in stating patient was prescribed Norco on discharge from hospital (12/06) per facility he is taking Oxycodone 5mg PRN. Inquiring if the patient should be taking both medications. Please advise.     FG#[phone]

## 2022-12-07 NOTE — TELEPHONE ENCOUNTER
Called and provided verbal d/c orders per Dr. Germaine Mayo of Oxycodone. Pt is to be taking Norco that was prescribed upon discharge.

## 2022-12-07 NOTE — ANESTHESIA POSTPROCEDURE EVALUATION
Department of Anesthesiology  Postprocedure Note    Patient: Karthikeyan Meredith  MRN: [id number]  [place]: 1980  Date of evaluation: 12/7/2022      Procedure Summary     Date: 12/06/22 Room / Location: [address]    Anesthesia Start: 9166 Anesthesia Stop: 5733    Procedure: Left lower extremity split thickness skin grafting (Left) Diagnosis:       Non-healing wound of lower extremity, left, subsequent encounter      (NON-HEALING LEG WOUND)    Surgeons: Heri Morel DO Responsible Provider: Joe Chase MD    Anesthesia Type: general ASA Status: 3          Anesthesia Type: No value filed.     Cynthia Phase I: Cynthia Score: 8    Cynthia Phase II: Cynthia Score: 8      Anesthesia Post Evaluation    Patient location during evaluation: PACU  Patient participation: complete - patient participated  Level of consciousness: awake  Nausea & Vomiting: no vomiting  Cardiovascular status: hemodynamically stable  Respiratory status: nasal cannula
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
## 2022-12-19 ENCOUNTER — OFFICE VISIT (OUTPATIENT)
Dept: ORTHOPEDIC SURGERY | Age: 42
End: 2022-12-19

## 2022-12-19 VITALS — BODY MASS INDEX: 35.25 KG/M2 | HEIGHT: 69 IN | WEIGHT: 238 LBS

## 2022-12-19 DIAGNOSIS — R52 PAIN: Primary | ICD-10-CM

## 2022-12-19 DIAGNOSIS — E55.9 VITAMIN D DEFICIENCY: ICD-10-CM

## 2022-12-19 DIAGNOSIS — S81.802D WOUND OF LEFT LOWER EXTREMITY, SUBSEQUENT ENCOUNTER: ICD-10-CM

## 2022-12-19 DIAGNOSIS — S52.301D CLOSED FRACTURE OF SHAFT OF RIGHT RADIUS WITH ROUTINE HEALING, UNSPECIFIED FRACTURE MORPHOLOGY, SUBSEQUENT ENCOUNTER: ICD-10-CM

## 2022-12-19 DIAGNOSIS — S72.352D CLOSED DISPLACED COMMINUTED FRACTURE OF SHAFT OF LEFT FEMUR WITH ROUTINE HEALING, SUBSEQUENT ENCOUNTER: ICD-10-CM

## 2022-12-19 DIAGNOSIS — S42.022D CLOSED DISPLACED FRACTURE OF SHAFT OF LEFT CLAVICLE WITH ROUTINE HEALING, SUBSEQUENT ENCOUNTER: ICD-10-CM

## 2022-12-19 DIAGNOSIS — S86.012D RUPTURE OF LEFT ACHILLES TENDON, SUBSEQUENT ENCOUNTER: ICD-10-CM

## 2022-12-19 DIAGNOSIS — S82.122D CLOSED FRACTURE OF LATERAL PORTION OF LEFT TIBIAL PLATEAU WITH ROUTINE HEALING, SUBSEQUENT ENCOUNTER: ICD-10-CM

## 2022-12-19 PROCEDURE — 99024 POSTOP FOLLOW-UP VISIT: CPT | Performed by: STUDENT IN AN ORGANIZED HEALTH CARE EDUCATION/TRAINING PROGRAM

## 2022-12-19 RX ORDER — ERGOCALCIFEROL 1.25 MG/1
50000 CAPSULE ORAL WEEKLY
Qty: 8 CAPSULE | Refills: 0 | Status: SHIPPED | OUTPATIENT
Start: 2022-12-19 | End: 2023-02-07

## 2022-12-19 RX ORDER — CYCLOBENZAPRINE HCL 10 MG
10 TABLET ORAL 3 TIMES DAILY PRN
Qty: 50 TABLET | Refills: 0 | Status: SHIPPED | OUTPATIENT
Start: 2022-12-19

## 2022-12-19 RX ORDER — OXYCODONE HYDROCHLORIDE AND ACETAMINOPHEN 5; 325 MG/1; MG/1
1 TABLET ORAL EVERY 6 HOURS PRN
Qty: 28 TABLET | Refills: 0 | Status: SHIPPED | OUTPATIENT
Start: 2022-12-19 | End: 2022-12-26

## 2022-12-19 RX ORDER — GABAPENTIN 300 MG/1
300 CAPSULE ORAL 3 TIMES DAILY
Qty: 42 CAPSULE | Refills: 0 | Status: SHIPPED | OUTPATIENT
Start: 2022-12-19 | End: 2023-01-02

## 2022-12-19 RX ORDER — NAPROXEN 500 MG/1
500 TABLET ORAL 2 TIMES DAILY WITH MEALS
Qty: 60 TABLET | Refills: 0 | Status: SHIPPED | OUTPATIENT
Start: 2022-12-19 | End: 2023-01-18

## 2022-12-19 NOTE — PATIENT INSTRUCTIONS
Wound care:   Upper left thigh: Leave open to air. Wash daily with soap and water. Okay to apply non-scented lotions. Lower left leg: Wash daily with soap and water, do not scrub. Pat dry then reapply xeroform and dry dressing. Change once daily. Weightbearing:  Bilateral upper extremities: No lifting, pulling or pushing greater than 5 lbs for 4 more weeks. After that, okay to begin weightbearing activities as tolerated. Okay to use a walker as needed. Therapy order provided to work on bilateral upper extremity nerve palsy. Left lower extremity: No weightbearing.

## 2022-12-19 NOTE — PROGRESS NOTES
[facility]  [address] [address]  Dept Phone: [phone]  Dept Fax: [phone]      [facility] Follow Up      Subjective:   Date of Surgery:     12/6/22: LLE split thickness skin grafting    11/15/22: ORIF left tibial plateau fracture, revision ORIF left fibula, repeat I&D LLE with application of matrix xenograft and wound vac     11/11/22: ORIF left fibula, left achilles tendon repair, ORIF left clavicle, ORIF right radial shaft, closed treatment left radial head, repeat I&D LLE with application of tissue expander and wound vac    11/9/22: IMN left femur, I&D LLE with application of tissue expander and wound vac     Melecio Herrera is a 38y.o. year old male who presents to the clinic today for routine follow up 2 weeks post operatively from split thickness grafting to the left lower extremity wound. He is also 5 weeks post operatively from the above listed procedures. He presents today with his significant other. States he currently resides at a nursing facility, states it is absolutely terrible care and he wishes to discharge home. States he is receiving minimal therapy and minimal wound care. States he has been compliant with nonweightbearing to the left lower extremity. He states they changed his pain medications to every 8 hours instead of every 4-6 and states his pain has not been well controlled. States most of his pain is in the left lower extremity. States he is still having difficulty with supination of bilateral forearms and wrist and digit extension, but states this has slowly improved since initial injury. Denies any numbness or tingling. Denies any new injuries or falls.      Review of Systems  Gen: no fever, chills, malaise  CV: no chest pain or palpitations  Resp: no cough or shortness of breath  GI: no nausea, vomiting, diarrhea, or constipation  Neuro: no seizures, vertigo, or headache  Msk: left leg pain   10 remaining systems reviewed and negative    Objective : There were no vitals filed for this visit. Body mass index is 35.66 kg/m². General: No acute distress, resting comfortably in the clinic  Neuro: alert. oriented  Eyes: Extra-ocular muscles intact  Pulm: Respirations unlabored and regular. Skin: warm, well perfused  Psych:   Patient has good fund of knowledge and displays understanding of exam, diagnosis, and plan. RUE: Skin intact. No ecchymosis, erythema, swelling or lacerations. Surgical incision well approximated and healing without evidence of infection. No TTP about the forearm. Full range of motion of the shoulder and elbow, equal to contralateral side. Approximately 85 degrees of pronation and 50 degrees of supination. Unable to actively extend the wrist past neutral. Unable to do a thumbs up, cross 2nd digit over the 3rd, and unable to actively extend digits 4 and 5. Able to flex all digits and flex the wrist. Compartments soft. 2+ rad pulse. Median/Radial/Ulnar nerve SILT. LUE: Skin intact. No ecchymosis, erythema, swelling or lacerations. No TTP about the clavicle. Surgical incision well approximated and healed without evidence of infection. Full range of motion of the shoulder and elbow. Approximately 85 degrees of pronation and 50 degrees of supination. Unable to actively extend the wrist past neutral. Unable to do a thumbs up, cross 2nd digit over the 3rd, and unable to actively extend digits 4 and 5. Able to flex all digits and flex the wrist. Compartments soft. 2+ rad pulse. Median/Radial/Ulnar nerve SILT. LLE:  Skin intact. Diffuse swelling about the lower leg from mid tibia to the toes. Surgical incisions well approximated and healing without evidence of infection. Left upper thigh skin graft donor site healing appropriately without evidence of  erythema or drainage. Left tibia wound with split thickness grafting healing appropriately with evidence of granulation tissue.  No surrounding erythema, drainage or necrosis. Able to flex and extend the digits. Minimal motion through the ankle due to swelling and stiffness. AROM of the knee approximately 10-90 degrees. Able to actively flex and extend the hip. Able to straight leg raise. Compartments soft. 2+ DP pulse. TA/EHL/FHL/GS motor intact. Deep and Superficial Peroneal/Saphenous/Sural SILT. Radiology:  Imaging studies from today were independently reviewed and read as listed below. Any relevant images obtained prior to today's visit were also independently interpreted. History: IMN left femur fracture    Comparison: 11/9/2022    Findings: 2 views of the left femur (AP, lateral) in a skeletally mature patient showing intramedullary nail and screw fixation of comminuted proximal and mid shaft femur fractures. There is interval bony callus formation and no change in fracture angulation or further displacement compared to previous imaging. No acute orthopedic hardware complications or loss of fixation. No new fractures or dislocations identified. Hardware in the proximal tibia partially visualized and best visualized on dedicated tibia radiographs. Impression: Stable IMN left comminuted segmental femur fracture with interval healing. History: ORIF left clavicle fracture     Comparison: 11/11/2022    Findings: 2 views of the left clavicle (AP) in a skeletally mature patient showing internal fixation of a mid shaft clavicle fracture with two plates and multiple screws. There is increased bony callus formation compared to previous imaging. No acute orthopedic hardware complications or loss of fixation. No new fractures or dislocations. Impression: Stable healing left clavicle shaft fracture s/p ORIF. History: ORIF right radial shaft fracture     Comparison: 11/11/2022    Findings: 2 views of the right forearm (AP, lateral) in a skeletally mature patient showing internal fixation of a right radial shaft fracture with a plate and multiple screws. There is increased bony consolidation compared to previous imaging. No new fractures or dislocations. No orthopedic hardware complications or loss of fixation. Impression: Stable healing right radial shaft fracture s/p ORIF. History: ORIF left fibular fracture     Comparison: 11/15/2022    Findings: 2 views of the left tibia fibula (AP, lateral) in a skeletally mature patient showing internal fixation of the lateral tibial plateau with a plate and multiple screws. There is also an intramedullary lulu fixating the comminuted fibula shaft fracture. There is increased bony callus formation at the fracture sites compared to previous imaging. Partially visualized hardware in the distal femur best visualized on dedicated femur radiographs. No acute orthopedic hardware complications or loss of fixation. No new fractures or dislocations. Impression: Stable healing left lateral tibial plateau fracture s/p ORIF and left fibula shaft fracture s/p IMN. Assessment:   38y.o. year old male with the followin/6/22: LLE split thickness skin grafting    11/15/22: ORIF left tibial plateau fracture, revision ORIF left fibula, repeat I&D LLE with application of matrix xenograft and wound vac     22: ORIF left fibula, left achilles tendon repair, ORIF left clavicle, ORIF right radial shaft, closed treatment left radial head, repeat I&D LLE with application of tissue expander and wound vac    22: IMN left femur, I&D LLE with application of tissue expander and wound vac   Plan:   - Lengthy discussion with the patient regarding his multiple injuries. - Continue nonweightbearing to the left lower extremity. Okay for range of motion of the hip, knee and ankle as tolerated. Order for PT provided with instructions.     - No lifting, pulling or pushing greater than 5 lbs with bilateral upper extremities for an additional 4 weeks. Okay for range of motion as tolerated. Order for OT provided with instructions. - Printed prescriptions for Oxycodone, Gabapentin, Cyclobenzaprine, Naproxen and Vitamin D per patient request. Instructed that he cannot fill these until he is discharged from his facility and to not take any prescriptions from the facility once he is discharged. - Wound care:   Upper left thigh: Leave open to air. Wash daily with soap and water. Okay to apply non-scented lotions. Lower left leg: Wash daily, do not scrub. Okay to let water run over wound only. Pat dry then reapply xeroform and dry dressing. Change once daily.     - Plan to follow up in 7 weeks which will be 12 weeks from surgical fixation of the left lateral tibial plateau and will re-evaluate WB status at that time. - Instructed patient to call the office at any time if there are any questions or concerns, and if there are any signs of infection of his wound. Patient and his wife verbalized understanding. Follow up:Return in about 7 weeks (around 2/6/2023) for x-rays. Orders Placed This Encounter   Medications    oxyCODONE-acetaminophen (PERCOCET) 5-325 MG per tablet     Sig: Take 1 tablet by mouth every 6 hours as needed for Pain for up to 7 days. Intended supply: 7 days. Take lowest dose possible to manage pain     Dispense:  28 tablet     Refill:  0     Reduce doses taken as pain becomes manageable    gabapentin (NEURONTIN) 300 MG capsule     Sig: Take 1 capsule by mouth 3 times daily for 14 days.      Dispense:  42 capsule     Refill:  0    cyclobenzaprine (FLEXERIL) 10 MG tablet     Sig: Take 1 tablet by mouth 3 times daily as needed for Muscle spasms     Dispense:  50 tablet     Refill:  0    naproxen (NAPROSYN) 500 MG tablet     Sig: Take 1 tablet by mouth 2 times daily (with meals)     Dispense:  60 tablet     Refill:  0    vitamin D (ERGOCALCIFEROL) 1.25 MG (90896 UT) CAPS capsule     Sig: Take 1 capsule by mouth once a week for 8 doses     Dispense:  8 capsule     Refill:  0    Gauze Pads & Dressings MISC     Sig: Dispense 1 box of Xeroform petrolatum dressings     Dispense:  1 each     Refill:  0          Orders Placed This Encounter   Procedures    XR TIBIA FIBULA LEFT (2 VIEWS)     Standing Status:   Future     Number of Occurrences:   1     Standing Expiration Date:   12/19/2023    XR FEMUR LEFT (MIN 2 VIEWS)     Standing Status:   Future     Number of Occurrences:   1     Standing Expiration Date:   12/19/2023    XR Clavicle Left     Standing Status:   Future     Number of Occurrences:   1     Standing Expiration Date:   12/19/2023    XR RADIUS ULNA RIGHT (2 VIEWS)     Standing Status:   Future     Number of Occurrences:   1     Standing Expiration Date:   12/19/2023    External Referral To Physical Therapy     Referral Priority:   Routine     Referral Type:   Eval and Treat     Referral Reason:   Specialty Services Required     Requested Specialty:   Physical Therapist     Number of Visits Requested:   1    External Referral To Occupational Therapy     Referral Priority:   Routine     Referral Type:   Eval and Treat     Referral Reason:   Specialty Services Required     Requested Specialty:   Occupational Therapy     Number of Visits Requested:   1       Electronically signed by Janina Mackey DO on 12/19/2022 at 5:30 PM    This note is created with the assistance of a speech recognition program.  While intending to generate a document that actually reflects the content of the visit, the document can still have some errors including those of syntax and sound a like substitutions which may escape proof reading.   In such instances, actual meaning can be extrapolated by contextual diversion

## 2022-12-27 DIAGNOSIS — S42.022D CLOSED DISPLACED FRACTURE OF SHAFT OF LEFT CLAVICLE WITH ROUTINE HEALING, SUBSEQUENT ENCOUNTER: ICD-10-CM

## 2022-12-27 DIAGNOSIS — S82.122D CLOSED FRACTURE OF LATERAL PORTION OF LEFT TIBIAL PLATEAU WITH ROUTINE HEALING, SUBSEQUENT ENCOUNTER: ICD-10-CM

## 2022-12-27 DIAGNOSIS — S72.352D CLOSED DISPLACED COMMINUTED FRACTURE OF SHAFT OF LEFT FEMUR WITH ROUTINE HEALING, SUBSEQUENT ENCOUNTER: ICD-10-CM

## 2022-12-27 DIAGNOSIS — S86.012D RUPTURE OF LEFT ACHILLES TENDON, SUBSEQUENT ENCOUNTER: ICD-10-CM

## 2022-12-27 RX ORDER — OXYCODONE HYDROCHLORIDE AND ACETAMINOPHEN 5; 325 MG/1; MG/1
1 TABLET ORAL EVERY 6 HOURS PRN
Qty: 28 TABLET | Refills: 0 | Status: SHIPPED | OUTPATIENT
Start: 2022-12-27 | End: 2023-01-03

## 2022-12-27 NOTE — TELEPHONE ENCOUNTER
Date of Surgery:      12/6/22: LLE split thickness skin grafting     11/15/22: ORIF left tibial plateau fracture, revision ORIF left fibula, repeat I&D LLE with application of matrix xenograft and wound vac      11/11/22: ORIF left fibula, left achilles tendon repair, ORIF left clavicle, ORIF right radial shaft, closed treatment left radial head, repeat I&D LLE with application of tissue expander and wound vac     11/9/22: IMN left femur, I&D LLE with application of tissue expander and wound vac     Patient requesting refill on pain medication.  Medication pended

## 2022-12-28 ENCOUNTER — TELEPHONE (OUTPATIENT)
Dept: ORTHOPEDIC SURGERY | Age: 42
End: 2022-12-28

## 2022-12-28 DIAGNOSIS — S81.802D WOUND OF LEFT LOWER EXTREMITY, SUBSEQUENT ENCOUNTER: ICD-10-CM

## 2022-12-28 NOTE — TELEPHONE ENCOUNTER
Patients girlfriend called in stating [facility] needs a new med co 15 as well as size specification for pts xeroform.  Updated xeroform order, and submitted updated medco 14

## 2023-01-03 DIAGNOSIS — S72.352D CLOSED DISPLACED COMMINUTED FRACTURE OF SHAFT OF LEFT FEMUR WITH ROUTINE HEALING, SUBSEQUENT ENCOUNTER: ICD-10-CM

## 2023-01-03 DIAGNOSIS — S42.022D CLOSED DISPLACED FRACTURE OF SHAFT OF LEFT CLAVICLE WITH ROUTINE HEALING, SUBSEQUENT ENCOUNTER: ICD-10-CM

## 2023-01-03 DIAGNOSIS — S82.122D CLOSED FRACTURE OF LATERAL PORTION OF LEFT TIBIAL PLATEAU WITH ROUTINE HEALING, SUBSEQUENT ENCOUNTER: ICD-10-CM

## 2023-01-03 DIAGNOSIS — S86.012D RUPTURE OF LEFT ACHILLES TENDON, SUBSEQUENT ENCOUNTER: ICD-10-CM

## 2023-01-03 RX ORDER — OXYCODONE HYDROCHLORIDE AND ACETAMINOPHEN 5; 325 MG/1; MG/1
1 TABLET ORAL EVERY 6 HOURS PRN
Qty: 25 TABLET | Refills: 0 | Status: SHIPPED | OUTPATIENT
Start: 2023-01-03 | End: 2023-01-10 | Stop reason: SDUPTHER

## 2023-01-03 NOTE — TELEPHONE ENCOUNTER
Date of Surgery:      12/6/22: LLE split thickness skin grafting     11/15/22: ORIF left tibial plateau fracture, revision ORIF left fibula, repeat I&D LLE with application of matrix xenograft and wound vac      11/11/22: ORIF left fibula, left achilles tendon repair, ORIF left clavicle, ORIF right radial shaft, closed treatment left radial head, repeat I&D LLE with application of tissue expander and wound vac     11/9/22: IMN left femur, I&D LLE with application of tissue expander and wound vac     Refill request pending
No

## 2023-01-06 ENCOUNTER — TELEPHONE (OUTPATIENT)
Dept: ORTHOPEDIC SURGERY | Age: 43
End: 2023-01-06

## 2023-01-06 NOTE — TELEPHONE ENCOUNTER
[address] [facility]. occupational therapist from [facility] [employer] called in requesting order for resting hand splints at . Informed her that office needed supporting notes to send with c9 for the request. Awaiting notes and c9 will be completed and faxed to [facility].  [address] [facility]. phone number is [phone] fax number [phone]

## 2023-01-10 DIAGNOSIS — S72.352D CLOSED DISPLACED COMMINUTED FRACTURE OF SHAFT OF LEFT FEMUR WITH ROUTINE HEALING, SUBSEQUENT ENCOUNTER: ICD-10-CM

## 2023-01-10 DIAGNOSIS — S42.022D CLOSED DISPLACED FRACTURE OF SHAFT OF LEFT CLAVICLE WITH ROUTINE HEALING, SUBSEQUENT ENCOUNTER: ICD-10-CM

## 2023-01-10 DIAGNOSIS — S86.012D RUPTURE OF LEFT ACHILLES TENDON, SUBSEQUENT ENCOUNTER: ICD-10-CM

## 2023-01-10 DIAGNOSIS — S82.122D CLOSED FRACTURE OF LATERAL PORTION OF LEFT TIBIAL PLATEAU WITH ROUTINE HEALING, SUBSEQUENT ENCOUNTER: ICD-10-CM

## 2023-01-10 RX ORDER — OXYCODONE HYDROCHLORIDE AND ACETAMINOPHEN 5; 325 MG/1; MG/1
1 TABLET ORAL EVERY 6 HOURS PRN
Qty: 21 TABLET | Refills: 0 | Status: SHIPPED | OUTPATIENT
Start: 2023-01-10 | End: 2023-01-17 | Stop reason: SDUPTHER

## 2023-01-10 NOTE — TELEPHONE ENCOUNTER
Date of Surgery:      12/6/22: LLE split thickness skin grafting     11/15/22: ORIF left tibial plateau fracture, revision ORIF left fibula, repeat I&D LLE with application of matrix xenograft and wound vac      11/11/22: ORIF left fibula, left achilles tendon repair, ORIF left clavicle, ORIF right radial shaft, closed treatment left radial head, repeat I&D LLE with application of tissue expander and wound vac     11/9/22: IMN left femur, I&D LLE with application of tissue expander and wound vac      Refill request pending

## 2023-01-13 ENCOUNTER — PATIENT MESSAGE (OUTPATIENT)
Dept: ORTHOPEDIC SURGERY | Age: 43
End: 2023-01-13

## 2023-01-13 NOTE — TELEPHONE ENCOUNTER
From: Naresh Oliveira  To: Dr. Bourne Stain: 1/13/2023 12:01 PM EST  Subject: C-9 for Zero form    I was wondering if you had received the C-9 back for his wound care treatment supplies. I don't have very many places to obtain supplies. Thank you.

## 2023-01-16 ENCOUNTER — TELEPHONE (OUTPATIENT)
Dept: ORTHOPEDIC SURGERY | Age: 43
End: 2023-01-16

## 2023-01-16 DIAGNOSIS — G58.9 NERVE PALSY: Primary | ICD-10-CM

## 2023-01-16 NOTE — TELEPHONE ENCOUNTER
DATE OF PROCEDURE:  12/06/2022     PREOPERATIVE DIAGNOSIS:  Left lower leg wound 20 x 8 cm. POSTOPERATIVE DIAGNOSIS:  Left lower leg wound 20 x 8 cm. PROCEDURE:  1. Left lower extremity split-thickness skin grafting 20 x 8 cm. 2.  Preparation of graft bed at 20 x 8 cm. Patient wife called about referral to neurology. Stated that  you were recommending neurologist. Please advise with dx?

## 2023-01-17 DIAGNOSIS — S42.022D CLOSED DISPLACED FRACTURE OF SHAFT OF LEFT CLAVICLE WITH ROUTINE HEALING, SUBSEQUENT ENCOUNTER: ICD-10-CM

## 2023-01-17 DIAGNOSIS — S82.122D CLOSED FRACTURE OF LATERAL PORTION OF LEFT TIBIAL PLATEAU WITH ROUTINE HEALING, SUBSEQUENT ENCOUNTER: ICD-10-CM

## 2023-01-17 DIAGNOSIS — S86.012D RUPTURE OF LEFT ACHILLES TENDON, SUBSEQUENT ENCOUNTER: ICD-10-CM

## 2023-01-17 DIAGNOSIS — S72.352D CLOSED DISPLACED COMMINUTED FRACTURE OF SHAFT OF LEFT FEMUR WITH ROUTINE HEALING, SUBSEQUENT ENCOUNTER: ICD-10-CM

## 2023-01-17 RX ORDER — OXYCODONE HYDROCHLORIDE AND ACETAMINOPHEN 5; 325 MG/1; MG/1
1 TABLET ORAL EVERY 6 HOURS PRN
Qty: 21 TABLET | Refills: 0 | Status: SHIPPED | OUTPATIENT
Start: 2023-01-17 | End: 2023-01-24

## 2023-01-17 NOTE — TELEPHONE ENCOUNTER
C9 for neurology referral faxed to [facility], also referral and notes faxed to [facility] per request at [phone]

## 2023-01-17 NOTE — TELEPHONE ENCOUNTER
12/9/2022 DATE OF PROCEDURE:  12/06/2022        PROCEDURE:  1.  Left lower extremity split-thickness skin grafting 20 x 8 cm.  2.  Preparation of graft bed at 20 x 8 cm.    11/15/2022 TIBIAL PLATEAU OPEN REDUCTION INTERNAL FIXATION, LEFT FIBULA ELASTIC NAIL EXCHANGE    Refill request pending

## 2023-01-23 DIAGNOSIS — S82.122D CLOSED FRACTURE OF LATERAL PORTION OF LEFT TIBIAL PLATEAU WITH ROUTINE HEALING, SUBSEQUENT ENCOUNTER: ICD-10-CM

## 2023-01-23 DIAGNOSIS — S72.352D CLOSED DISPLACED COMMINUTED FRACTURE OF SHAFT OF LEFT FEMUR WITH ROUTINE HEALING, SUBSEQUENT ENCOUNTER: ICD-10-CM

## 2023-01-23 DIAGNOSIS — S86.012D RUPTURE OF LEFT ACHILLES TENDON, SUBSEQUENT ENCOUNTER: ICD-10-CM

## 2023-01-23 DIAGNOSIS — S42.022D CLOSED DISPLACED FRACTURE OF SHAFT OF LEFT CLAVICLE WITH ROUTINE HEALING, SUBSEQUENT ENCOUNTER: ICD-10-CM

## 2023-01-25 RX ORDER — OXYCODONE HYDROCHLORIDE AND ACETAMINOPHEN 5; 325 MG/1; MG/1
1 TABLET ORAL EVERY 6 HOURS PRN
Qty: 21 TABLET | Refills: 0 | Status: SHIPPED | OUTPATIENT
Start: 2023-01-25 | End: 2023-01-31 | Stop reason: SDUPTHER

## 2023-01-25 RX ORDER — NAPROXEN 500 MG/1
500 TABLET ORAL 2 TIMES DAILY WITH MEALS
Qty: 60 TABLET | Refills: 0 | Status: SHIPPED | OUTPATIENT
Start: 2023-01-25 | End: 2023-03-13 | Stop reason: SDUPTHER

## 2023-01-25 RX ORDER — OXYCODONE HYDROCHLORIDE AND ACETAMINOPHEN 5; 325 MG/1; MG/1
1 TABLET ORAL EVERY 6 HOURS PRN
Qty: 21 TABLET | Refills: 0 | Status: CANCELLED | OUTPATIENT
Start: 2023-01-25 | End: 2023-02-01

## 2023-01-25 RX ORDER — CYCLOBENZAPRINE HCL 10 MG
10 TABLET ORAL 3 TIMES DAILY PRN
Qty: 50 TABLET | Refills: 0 | Status: SHIPPED | OUTPATIENT
Start: 2023-01-25 | End: 2023-03-13 | Stop reason: SDUPTHER

## 2023-01-25 RX ORDER — GABAPENTIN 300 MG/1
300 CAPSULE ORAL 3 TIMES DAILY
Qty: 42 CAPSULE | Refills: 0 | Status: SHIPPED | OUTPATIENT
Start: 2023-01-25 | End: 2023-02-08

## 2023-01-31 DIAGNOSIS — S72.352D CLOSED DISPLACED COMMINUTED FRACTURE OF SHAFT OF LEFT FEMUR WITH ROUTINE HEALING, SUBSEQUENT ENCOUNTER: ICD-10-CM

## 2023-01-31 RX ORDER — OXYCODONE HYDROCHLORIDE AND ACETAMINOPHEN 5; 325 MG/1; MG/1
1 TABLET ORAL EVERY 6 HOURS PRN
Qty: 21 TABLET | Refills: 0 | Status: SHIPPED | OUTPATIENT
Start: 2023-01-31 | End: 2023-02-07

## 2023-01-31 NOTE — TELEPHONE ENCOUNTER
Patient is requesting a refill of pain medication, medication pended.     Date of Surgery:      12/6/22: LLE split thickness skin grafting     11/15/22: ORIF left tibial plateau fracture, revision ORIF left fibula, repeat I&D LLE with application of matrix xenograft and wound vac      11/11/22: ORIF left fibula, left achilles tendon repair, ORIF left clavicle, ORIF right radial shaft, closed treatment left radial head, repeat I&D LLE with application of tissue expander and wound vac     11/9/22: IMN left femur, I&D LLE with application of tissue expander and wound vac

## 2023-02-07 DIAGNOSIS — S72.352D CLOSED DISPLACED COMMINUTED FRACTURE OF SHAFT OF LEFT FEMUR WITH ROUTINE HEALING, SUBSEQUENT ENCOUNTER: ICD-10-CM

## 2023-02-07 RX ORDER — OXYCODONE HYDROCHLORIDE AND ACETAMINOPHEN 5; 325 MG/1; MG/1
1 TABLET ORAL EVERY 6 HOURS PRN
Qty: 21 TABLET | Refills: 0 | Status: SHIPPED | OUTPATIENT
Start: 2023-02-07 | End: 2023-02-14

## 2023-02-07 NOTE — TELEPHONE ENCOUNTER
12/6/22: LLE split thickness skin grafting     11/15/22: ORIF left tibial plateau fracture, revision ORIF left fibula, repeat I&D LLE with application of matrix xenograft and wound vac      11/11/22: ORIF left fibula, left achilles tendon repair, ORIF left clavicle, ORIF right radial shaft, closed treatment left radial head, repeat I&D LLE with application of tissue expander and wound vac     11/9/22: IMN left femur, I&D LLE with application of tissue expander and wound vac     Refill request pended.

## 2023-02-09 ENCOUNTER — TELEPHONE (OUTPATIENT)
Dept: ORTHOPEDIC SURGERY | Age: 43
End: 2023-02-09

## 2023-02-09 NOTE — TELEPHONE ENCOUNTER
Called patient to inform him that neuro consult was approved through [facility], no answer left VM

## 2023-02-13 ENCOUNTER — OFFICE VISIT (OUTPATIENT)
Dept: ORTHOPEDIC SURGERY | Age: 43
End: 2023-02-13

## 2023-02-13 VITALS — WEIGHT: 238 LBS | HEIGHT: 69 IN | BODY MASS INDEX: 35.25 KG/M2

## 2023-02-13 DIAGNOSIS — S82.122D CLOSED FRACTURE OF LATERAL PORTION OF LEFT TIBIAL PLATEAU WITH ROUTINE HEALING, SUBSEQUENT ENCOUNTER: ICD-10-CM

## 2023-02-13 DIAGNOSIS — S52.301D CLOSED FRACTURE OF SHAFT OF RIGHT RADIUS WITH ROUTINE HEALING, UNSPECIFIED FRACTURE MORPHOLOGY, SUBSEQUENT ENCOUNTER: ICD-10-CM

## 2023-02-13 DIAGNOSIS — S72.352D CLOSED DISPLACED COMMINUTED FRACTURE OF SHAFT OF LEFT FEMUR WITH ROUTINE HEALING, SUBSEQUENT ENCOUNTER: Primary | ICD-10-CM

## 2023-02-13 DIAGNOSIS — S81.802D WOUND OF LEFT LOWER EXTREMITY, SUBSEQUENT ENCOUNTER: ICD-10-CM

## 2023-02-13 DIAGNOSIS — S42.022D CLOSED DISPLACED FRACTURE OF SHAFT OF LEFT CLAVICLE WITH ROUTINE HEALING, SUBSEQUENT ENCOUNTER: ICD-10-CM

## 2023-02-13 PROCEDURE — 99024 POSTOP FOLLOW-UP VISIT: CPT | Performed by: STUDENT IN AN ORGANIZED HEALTH CARE EDUCATION/TRAINING PROGRAM

## 2023-02-13 RX ORDER — TRAMADOL HYDROCHLORIDE 50 MG/1
50 TABLET ORAL EVERY 6 HOURS PRN
Qty: 28 TABLET | Refills: 0 | Status: SHIPPED | OUTPATIENT
Start: 2023-02-13 | End: 2023-02-20

## 2023-02-13 NOTE — PROGRESS NOTES
[facility]  [address] [place]  [address]  Dept Phone: [phone]  Dept Fax: [phone]      Orthopaedic Trauma Clinic Follow Up      Subjective:   Date of Surgery:      12/6/22: LLE split thickness skin grafting     11/15/22: ORIF left tibial plateau fracture, revision ORIF left fibula, repeat I&D LLE with application of matrix xenograft and wound vac      11/11/22: ORIF left fibula, left achilles tendon repair, ORIF left clavicle, ORIF right radial shaft, closed treatment left radial head, repeat I&D LLE with application of tissue expander and wound vac     11/9/22: IMN left femur, I&D LLE with application of tissue expander and wound vac     Lawanda Leonard is a 38y.o. year old male who presents to the clinic today for routine follow-up of above listed surgeries. Patient is accompanied today with his wife in clinic. Patient is back at home. Patient has been doing daily dressing changes to his left lower extremity. 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 Patient is scheduled to see a neurologist within the upcoming month. Patient denies any new injuries or falls. Patient is inquiring if there is a stitch still present within his left clavicle incision. Patient is wanting to begin weightbearing to his left lower extremity today. Denies any new numbness or tingling. Denies any new injuries or falls. Patient's biggest complaint is left knee pain. Review of Systems  Gen: no fever, chills, malaise  CV: no chest pain or palpitations  Resp: no cough or shortness of breath  GI: no nausea, vomiting, diarrhea, or constipation  Neuro: no seizures, vertigo, or headache  Msk: Per HPI  10 remaining systems reviewed and negative    Objective : There were no vitals filed for this visit. Body mass index is 35.66 kg/m². General: No acute distress, resting comfortably in the clinic  Neuro: alert. oriented  Eyes: Extra-ocular muscles intact  Pulm: Respirations unlabored and regular. Skin: warm, well perfused  Psych:   Patient has good fund of knowledge and displays understanding of exam, diagnosis, and plan. MSK: Left upper extremity: Mature scar noted to left clavicle. Small residual suture noted to distal extent of clavicle. No erythema or signs of infection. Mild tenderness palpation over scar, nontender to palpation about clavicle itself. Nontender to palpation about left elbow, patient does have 15 degree loss of extension and left elbow compared to contralateral side likely secondary to radial head fracture. Supination 60 degrees, pronation 60 degrees. Patient expresses normal sensation light touch C5-T1 distribution. Hand is warm well perfused. AIN/PIN/radial/ulnar/median nerve motor intact but residual difficulty with extension of fingers but improved from prior encounter  Right upper extremity: Mature scar noted to volar forearm. Nontender to palpation. Active range of motion right elbow 0-130 degrees without pain throughout. Supination 60 degrees, pronation 60 degrees. Arm is warm/well perfused. AIN/PIN/radial/ulnar/median nerve motor intact but decreased in wrist and finger extension but improved from prior encounter. Patient has normal sensation light touch C5-T1 distribution  Left lower extremity: Mature scar noted to left thigh and left proximal tibia from prior surgical intervention. Partial complete healing of split-thickness skin graft wound to left lower extremity with residual eschar and fibrous tissue noted to more posterior lateral wound. See media below. Leg is warm well perfused. No signs of infection or drainage. EHL/FHL/TA/GSC motor intact. Patient is present normal sensation light touch left lower extremity. Patient able to stand without any significant pain to his left leg.         Radiology:  Imaging studies from today were independently reviewed and read as listed below. Any relevant images obtained prior to today's visit were also independently interpreted. History: 67-year-old male status post open reduction internal fixation left tibial plateau and fibula    Comparison: 12/19/2022    Findings: 2 views of the left tibia/fibula (AP/lateral) in a skeletally mature patient showing redemonstration orthopedic hardware in the form of plate and screws as well as a single lulu to the left fibula. Interval bony consolidation of left tibial plateau fracture when compared to prior films. No changes in overall alignment. No signs of hardware failure or loosening. Fracture lines of left fibula fracture still visualized without any interval signs of healing. Femoral hardware noted without signs of loosening. Impression: Stable hardware left tibia/fibula with interval healing of tibial plateau      History: 67-year-old male status post intramedullary nail fixation segmental left femur fracture    Comparison: 12/19/2022    Findings: 2 views of the left femur (AP/lateral) in a skeletally mature patient showing redemonstration orthopedic hardware in the form of intramedullary nail and screws to left femur. Partially visualized tibial hardware noted. Segmental femur fracture visualized with interval callus formation of both proximal and distal fractures. No signs of hardware failure or loosening. No changes in overall alignment. Impression: Stable hardware left femur with healing      History: 67-year-old male status post open reduction internal fixation left clavicle    Comparison: 12/19/2022    Findings: 2 views of the left clavicle in a skeletally mature patient showing redemonstration of orthopedic hardware in the form of plate and screws to left clavicle. Fracture line still visualized but no change in alignment. No signs of hardware failure or loosening.       Impression: Stable hardware left clavicle      History: 67-year-old male status post open reduction internal fixation right radius    Comparison: 12/19/2022    Findings: 2 views of the right radius/ulna (AP/lateral) in a skeletally mature patient showing redemonstration of orthopedic hardware in the form of plate and screws to right radial shaft. Fracture line still visualized but interval bony consolidation when compared to prior films. No signs of hardware failure or loosening. Elbow alignment well-maintained. Impression: Stable hardware right radius with interval healing     Assessment:   38y.o. year old male s/p    12/6/22: LLE split thickness skin grafting     11/15/22: ORIF left tibial plateau fracture, revision ORIF left fibula, repeat I&D LLE with application of matrix xenograft and wound vac      11/11/22: ORIF left fibula, left achilles tendon repair, ORIF left clavicle, ORIF right radial shaft, closed treatment left radial head, repeat I&D LLE with application of tissue expander and wound vac     11/9/22: IMN left femur, I&D LLE with application of tissue expander and wound vac     Plan:   Lengthy discussion had with patient and patient's wife about current clinical state. In terms of his left upper extremity, there was a small residual suture remaining in his scar about his left clavicle that was removed today in the office. Activities as tolerated bilateral upper extremities. Continue working on range of motion of wrist, and forearms and left elbow. Patient would highly benefit from neurology evaluation for his residual motor deficits of bilateral upper extremities. Patient would also benefit from cock up wrist braces that patient states he was previously fitted for at his facility. Patient would also highly benefit from occupational therapy to work on finger motion and to keep all joints very supple. Referral made. In terms of his left lower extremity: At this time patient may begin weight-bear as tolerated left lower extremity.   PT prescription provided patient that states he may begin working on ambulation to left lower extremity. Okay for utilization of brace as patient would like to have increased instability when beginning ambulating fully on his left lower extremity. In terms of his wound to his left leg, overall very satisfied with his healing. Continue daily dressing changes left lower extremity. Patient would benefit from wound care evaluation for debridement of some of the eschar. Referral made. Did discuss findings of his tibial plateau, and a very pleased with healing of his tibial plateau fracture. In terms of his fibula, no signs of healing, will continue to monitor over the next several months. I will plan on seeing patient back in 3 months or sooner if any acute issues arise. Tramadol prescription was filled for patient today. Recommend only using with increase in pain during therapy. All questions answered. Patient is amenable to this plan. Electronically signed by Staci Frank DO on 2/13/2023 at 3:11 PM    This note is created with the assistance of a speech recognition program.  While intending to generate a document that actually reflects the content of the visit, the document can still have some errors including those of syntax and sound a like substitutions which may escape proof reading.   In such instances, actual meaning can be extrapolated by contextual diversion

## 2023-02-21 ENCOUNTER — PATIENT MESSAGE (OUTPATIENT)
Dept: ORTHOPEDIC SURGERY | Age: 43
End: 2023-02-21

## 2023-02-21 DIAGNOSIS — S81.802D WOUND OF LEFT LOWER EXTREMITY, SUBSEQUENT ENCOUNTER: ICD-10-CM

## 2023-02-21 DIAGNOSIS — S82.122D CLOSED FRACTURE OF LATERAL PORTION OF LEFT TIBIAL PLATEAU WITH ROUTINE HEALING, SUBSEQUENT ENCOUNTER: ICD-10-CM

## 2023-02-21 DIAGNOSIS — S42.022D CLOSED DISPLACED FRACTURE OF SHAFT OF LEFT CLAVICLE WITH ROUTINE HEALING, SUBSEQUENT ENCOUNTER: ICD-10-CM

## 2023-02-21 DIAGNOSIS — S52.301D CLOSED FRACTURE OF SHAFT OF RIGHT RADIUS WITH ROUTINE HEALING, UNSPECIFIED FRACTURE MORPHOLOGY, SUBSEQUENT ENCOUNTER: ICD-10-CM

## 2023-02-21 DIAGNOSIS — S72.352D CLOSED DISPLACED COMMINUTED FRACTURE OF SHAFT OF LEFT FEMUR WITH ROUTINE HEALING, SUBSEQUENT ENCOUNTER: ICD-10-CM

## 2023-02-21 RX ORDER — TRAMADOL HYDROCHLORIDE 50 MG/1
50 TABLET ORAL EVERY 6 HOURS PRN
Qty: 28 TABLET | Refills: 0 | Status: SHIPPED | OUTPATIENT
Start: 2023-02-21 | End: 2023-02-28

## 2023-02-21 NOTE — TELEPHONE ENCOUNTER
From: Moose Farmer  To: Dr. Cris Aj: 2/21/2023 2:50 AM EST  Subject: Medication    I don't see the Tramadol medication to request a refill. I will say that since he has been putting weight on the left leg he is in significantly more pain. I am requesting a refill for the Tramadol. Thank you in advance.

## 2023-02-21 NOTE — TELEPHONE ENCOUNTER
DATE OF PROCEDURE:  12/06/2022     PREOPERATIVE DIAGNOSIS:  Left lower leg wound 20 x 8 cm. POSTOPERATIVE DIAGNOSIS:  Left lower leg wound 20 x 8 cm. PROCEDURE:  1. Left lower extremity split-thickness skin grafting 20 x 8 cm. 2.  Preparation of graft bed at 20 x 8 cm. Refill request pended.

## 2023-02-27 DIAGNOSIS — S42.022D CLOSED DISPLACED FRACTURE OF SHAFT OF LEFT CLAVICLE WITH ROUTINE HEALING, SUBSEQUENT ENCOUNTER: ICD-10-CM

## 2023-02-27 DIAGNOSIS — S52.301D CLOSED FRACTURE OF SHAFT OF RIGHT RADIUS WITH ROUTINE HEALING, UNSPECIFIED FRACTURE MORPHOLOGY, SUBSEQUENT ENCOUNTER: ICD-10-CM

## 2023-02-27 DIAGNOSIS — S72.352D CLOSED DISPLACED COMMINUTED FRACTURE OF SHAFT OF LEFT FEMUR WITH ROUTINE HEALING, SUBSEQUENT ENCOUNTER: ICD-10-CM

## 2023-02-27 DIAGNOSIS — S81.802D WOUND OF LEFT LOWER EXTREMITY, SUBSEQUENT ENCOUNTER: ICD-10-CM

## 2023-02-27 DIAGNOSIS — S82.122D CLOSED FRACTURE OF LATERAL PORTION OF LEFT TIBIAL PLATEAU WITH ROUTINE HEALING, SUBSEQUENT ENCOUNTER: ICD-10-CM

## 2023-02-27 RX ORDER — TRAMADOL HYDROCHLORIDE 50 MG/1
50 TABLET ORAL EVERY 6 HOURS PRN
Qty: 28 TABLET | Refills: 0 | OUTPATIENT
Start: 2023-02-27 | End: 2023-03-06

## 2023-03-01 ENCOUNTER — PATIENT MESSAGE (OUTPATIENT)
Dept: ORTHOPEDIC SURGERY | Age: 43
End: 2023-03-01

## 2023-03-01 DIAGNOSIS — S81.802D WOUND OF LEFT LOWER EXTREMITY, SUBSEQUENT ENCOUNTER: ICD-10-CM

## 2023-03-01 DIAGNOSIS — S82.122D CLOSED FRACTURE OF LATERAL PORTION OF LEFT TIBIAL PLATEAU WITH ROUTINE HEALING, SUBSEQUENT ENCOUNTER: ICD-10-CM

## 2023-03-01 DIAGNOSIS — S52.301D CLOSED FRACTURE OF SHAFT OF RIGHT RADIUS WITH ROUTINE HEALING, UNSPECIFIED FRACTURE MORPHOLOGY, SUBSEQUENT ENCOUNTER: ICD-10-CM

## 2023-03-01 DIAGNOSIS — S72.352D CLOSED DISPLACED COMMINUTED FRACTURE OF SHAFT OF LEFT FEMUR WITH ROUTINE HEALING, SUBSEQUENT ENCOUNTER: ICD-10-CM

## 2023-03-01 DIAGNOSIS — S42.022D CLOSED DISPLACED FRACTURE OF SHAFT OF LEFT CLAVICLE WITH ROUTINE HEALING, SUBSEQUENT ENCOUNTER: ICD-10-CM

## 2023-03-01 RX ORDER — TRAMADOL HYDROCHLORIDE 50 MG/1
50 TABLET ORAL EVERY 6 HOURS PRN
Qty: 28 TABLET | Refills: 0 | Status: SHIPPED | OUTPATIENT
Start: 2023-03-01 | End: 2023-03-08

## 2023-03-01 NOTE — TELEPHONE ENCOUNTER
From: David Bourgeois  To: Dr. Chong Fuel: 3/1/2023 11:49 AM EST  Subject: C-9 for PT and medication    I am checking to see if the correct C-9 was sent to [facility]. The one that they received and approved did not have physical therapy on it and he needs that more than the OT and the Wound Care that were included. Also, how far in advance do I need to request his pain medication? I understand that they want him to take less and less, but he went from Percocet to Tramadol and in my opinion it was too big of a jump with the ability to put weight on the leg. He is in significant pain with what we are doing at home, so I can't imagine what it's going to be when he actually gets back into therapy, which needs to be soon. However, he is trying to manage with the Tramadol, I just need to know if I need to request it earlier to get it for him on time. Please advise.

## 2023-03-06 DIAGNOSIS — S81.802D WOUND OF LEFT LOWER EXTREMITY, SUBSEQUENT ENCOUNTER: ICD-10-CM

## 2023-03-06 DIAGNOSIS — S42.022D CLOSED DISPLACED FRACTURE OF SHAFT OF LEFT CLAVICLE WITH ROUTINE HEALING, SUBSEQUENT ENCOUNTER: ICD-10-CM

## 2023-03-06 DIAGNOSIS — S82.122D CLOSED FRACTURE OF LATERAL PORTION OF LEFT TIBIAL PLATEAU WITH ROUTINE HEALING, SUBSEQUENT ENCOUNTER: ICD-10-CM

## 2023-03-06 DIAGNOSIS — S52.301D CLOSED FRACTURE OF SHAFT OF RIGHT RADIUS WITH ROUTINE HEALING, UNSPECIFIED FRACTURE MORPHOLOGY, SUBSEQUENT ENCOUNTER: ICD-10-CM

## 2023-03-06 DIAGNOSIS — S72.352D CLOSED DISPLACED COMMINUTED FRACTURE OF SHAFT OF LEFT FEMUR WITH ROUTINE HEALING, SUBSEQUENT ENCOUNTER: ICD-10-CM

## 2023-03-06 RX ORDER — TRAMADOL HYDROCHLORIDE 50 MG/1
50 TABLET ORAL EVERY 6 HOURS PRN
Qty: 28 TABLET | Refills: 0 | OUTPATIENT
Start: 2023-03-06 | End: 2023-03-13

## 2023-03-08 ENCOUNTER — PATIENT MESSAGE (OUTPATIENT)
Dept: ORTHOPEDIC SURGERY | Age: 43
End: 2023-03-08

## 2023-03-08 DIAGNOSIS — S81.802D WOUND OF LEFT LOWER EXTREMITY, SUBSEQUENT ENCOUNTER: ICD-10-CM

## 2023-03-08 DIAGNOSIS — S72.352D CLOSED DISPLACED COMMINUTED FRACTURE OF SHAFT OF LEFT FEMUR WITH ROUTINE HEALING, SUBSEQUENT ENCOUNTER: ICD-10-CM

## 2023-03-08 DIAGNOSIS — S82.122D CLOSED FRACTURE OF LATERAL PORTION OF LEFT TIBIAL PLATEAU WITH ROUTINE HEALING, SUBSEQUENT ENCOUNTER: ICD-10-CM

## 2023-03-08 DIAGNOSIS — S42.022D CLOSED DISPLACED FRACTURE OF SHAFT OF LEFT CLAVICLE WITH ROUTINE HEALING, SUBSEQUENT ENCOUNTER: ICD-10-CM

## 2023-03-08 DIAGNOSIS — S52.301D CLOSED FRACTURE OF SHAFT OF RIGHT RADIUS WITH ROUTINE HEALING, UNSPECIFIED FRACTURE MORPHOLOGY, SUBSEQUENT ENCOUNTER: ICD-10-CM

## 2023-03-08 RX ORDER — TRAMADOL HYDROCHLORIDE 50 MG/1
50 TABLET ORAL EVERY 6 HOURS PRN
Qty: 28 TABLET | Refills: 0 | Status: SHIPPED | OUTPATIENT
Start: 2023-03-08 | End: 2023-03-15

## 2023-03-08 NOTE — TELEPHONE ENCOUNTER
From: Melody Leung  To: Dr. Susana Gonzalez: 3/8/2023 11:28 AM EST  Subject: Medication    Has his prescription been sent for approval?

## 2023-03-08 NOTE — TELEPHONE ENCOUNTER
Note   DATE OF PROCEDURE:  12/06/2022     PREOPERATIVE DIAGNOSIS:  Left lower leg wound 20 x 8 cm. POSTOPERATIVE DIAGNOSIS:  Left lower leg wound 20 x 8 cm. PROCEDURE:  1. Left lower extremity split-thickness skin grafting 20 x 8 cm. 2.  Preparation of graft bed at 20 x 8 cm. Refill request pended.

## 2023-03-13 DIAGNOSIS — S42.022D CLOSED DISPLACED FRACTURE OF SHAFT OF LEFT CLAVICLE WITH ROUTINE HEALING, SUBSEQUENT ENCOUNTER: ICD-10-CM

## 2023-03-13 DIAGNOSIS — S82.122D CLOSED FRACTURE OF LATERAL PORTION OF LEFT TIBIAL PLATEAU WITH ROUTINE HEALING, SUBSEQUENT ENCOUNTER: ICD-10-CM

## 2023-03-13 DIAGNOSIS — S86.012D RUPTURE OF LEFT ACHILLES TENDON, SUBSEQUENT ENCOUNTER: ICD-10-CM

## 2023-03-13 DIAGNOSIS — S72.352D CLOSED DISPLACED COMMINUTED FRACTURE OF SHAFT OF LEFT FEMUR WITH ROUTINE HEALING, SUBSEQUENT ENCOUNTER: ICD-10-CM

## 2023-03-14 RX ORDER — NAPROXEN 500 MG/1
500 TABLET ORAL 2 TIMES DAILY WITH MEALS
Qty: 60 TABLET | Refills: 0 | Status: SHIPPED | OUTPATIENT
Start: 2023-03-14 | End: 2023-04-13

## 2023-03-14 RX ORDER — CYCLOBENZAPRINE HCL 10 MG
10 TABLET ORAL 2 TIMES DAILY PRN
Qty: 30 TABLET | Refills: 0 | Status: SHIPPED | OUTPATIENT
Start: 2023-03-14

## 2023-03-15 DIAGNOSIS — S42.022D CLOSED DISPLACED FRACTURE OF SHAFT OF LEFT CLAVICLE WITH ROUTINE HEALING, SUBSEQUENT ENCOUNTER: ICD-10-CM

## 2023-03-15 DIAGNOSIS — S52.301D CLOSED FRACTURE OF SHAFT OF RIGHT RADIUS WITH ROUTINE HEALING, UNSPECIFIED FRACTURE MORPHOLOGY, SUBSEQUENT ENCOUNTER: ICD-10-CM

## 2023-03-15 DIAGNOSIS — S72.352D CLOSED DISPLACED COMMINUTED FRACTURE OF SHAFT OF LEFT FEMUR WITH ROUTINE HEALING, SUBSEQUENT ENCOUNTER: ICD-10-CM

## 2023-03-15 DIAGNOSIS — S81.802D WOUND OF LEFT LOWER EXTREMITY, SUBSEQUENT ENCOUNTER: ICD-10-CM

## 2023-03-15 DIAGNOSIS — S82.122D CLOSED FRACTURE OF LATERAL PORTION OF LEFT TIBIAL PLATEAU WITH ROUTINE HEALING, SUBSEQUENT ENCOUNTER: ICD-10-CM

## 2023-03-16 RX ORDER — TRAMADOL HYDROCHLORIDE 50 MG/1
50 TABLET ORAL EVERY 6 HOURS PRN
Qty: 21 TABLET | Refills: 0 | Status: SHIPPED | OUTPATIENT
Start: 2023-03-16 | End: 2023-03-23

## 2023-03-22 DIAGNOSIS — S42.022D CLOSED DISPLACED FRACTURE OF SHAFT OF LEFT CLAVICLE WITH ROUTINE HEALING, SUBSEQUENT ENCOUNTER: ICD-10-CM

## 2023-03-22 DIAGNOSIS — S81.802D WOUND OF LEFT LOWER EXTREMITY, SUBSEQUENT ENCOUNTER: ICD-10-CM

## 2023-03-22 DIAGNOSIS — S52.301D CLOSED FRACTURE OF SHAFT OF RIGHT RADIUS WITH ROUTINE HEALING, UNSPECIFIED FRACTURE MORPHOLOGY, SUBSEQUENT ENCOUNTER: ICD-10-CM

## 2023-03-22 DIAGNOSIS — S72.352D CLOSED DISPLACED COMMINUTED FRACTURE OF SHAFT OF LEFT FEMUR WITH ROUTINE HEALING, SUBSEQUENT ENCOUNTER: ICD-10-CM

## 2023-03-22 DIAGNOSIS — S82.122D CLOSED FRACTURE OF LATERAL PORTION OF LEFT TIBIAL PLATEAU WITH ROUTINE HEALING, SUBSEQUENT ENCOUNTER: ICD-10-CM

## 2023-03-23 RX ORDER — TRAMADOL HYDROCHLORIDE 50 MG/1
50 TABLET ORAL EVERY 6 HOURS PRN
Qty: 21 TABLET | Refills: 0 | Status: SHIPPED | OUTPATIENT
Start: 2023-03-23 | End: 2023-03-30

## 2023-03-24 ENCOUNTER — TELEPHONE (OUTPATIENT)
Dept: ORTHOPEDIC SURGERY | Age: 43
End: 2023-03-24

## 2023-03-24 NOTE — TELEPHONE ENCOUNTER
Karly Byrnes from the Rehab requesting lov notes from 02/13/23 sent via fax to [phone] please advise

## 2023-05-10 ENCOUNTER — OFFICE VISIT (OUTPATIENT)
Dept: ORTHOPEDIC SURGERY | Age: 43
End: 2023-05-10

## 2023-05-10 VITALS — BODY MASS INDEX: 35.25 KG/M2 | HEIGHT: 69 IN | WEIGHT: 238 LBS

## 2023-05-10 DIAGNOSIS — S82.122D CLOSED FRACTURE OF LATERAL PORTION OF LEFT TIBIAL PLATEAU WITH ROUTINE HEALING, SUBSEQUENT ENCOUNTER: Primary | ICD-10-CM

## 2023-05-10 DIAGNOSIS — S72.352D CLOSED DISPLACED COMMINUTED FRACTURE OF SHAFT OF LEFT FEMUR WITH ROUTINE HEALING, SUBSEQUENT ENCOUNTER: ICD-10-CM

## 2023-05-10 RX ORDER — PREGABALIN 150 MG/1
CAPSULE ORAL
COMMUNITY
Start: 2023-04-18

## 2023-05-10 RX ORDER — OXYCODONE HYDROCHLORIDE AND ACETAMINOPHEN 5; 325 MG/1; MG/1
1 TABLET ORAL EVERY 6 HOURS PRN
Qty: 21 TABLET | Refills: 0 | Status: CANCELLED | OUTPATIENT
Start: 2023-05-10 | End: 2023-05-17

## 2023-05-12 DIAGNOSIS — S86.012D RUPTURE OF LEFT ACHILLES TENDON, SUBSEQUENT ENCOUNTER: ICD-10-CM

## 2023-05-12 DIAGNOSIS — S82.122D CLOSED FRACTURE OF LATERAL PORTION OF LEFT TIBIAL PLATEAU WITH ROUTINE HEALING, SUBSEQUENT ENCOUNTER: ICD-10-CM

## 2023-05-12 DIAGNOSIS — S42.022D CLOSED DISPLACED FRACTURE OF SHAFT OF LEFT CLAVICLE WITH ROUTINE HEALING, SUBSEQUENT ENCOUNTER: ICD-10-CM

## 2023-05-12 DIAGNOSIS — S72.352D CLOSED DISPLACED COMMINUTED FRACTURE OF SHAFT OF LEFT FEMUR WITH ROUTINE HEALING, SUBSEQUENT ENCOUNTER: ICD-10-CM

## 2023-05-12 RX ORDER — OXYCODONE HYDROCHLORIDE AND ACETAMINOPHEN 5; 325 MG/1; MG/1
1 TABLET ORAL EVERY 6 HOURS PRN
Qty: 21 TABLET | Refills: 0 | Status: SHIPPED | OUTPATIENT
Start: 2023-05-12 | End: 2023-05-19

## 2023-05-15 NOTE — PATIENT INSTRUCTIONS
At next encounter, will obtain x-ray left femur, left clavicle, right forearm. At next encounter, will obtain x-ray left femur, left clavicle, right forearm.   Next encounter, will obtain x-ray left femur, left clavicle, right forearm

## 2023-05-15 NOTE — PROGRESS NOTES
[facility]  [address] [facility]  Dept Phone: [phone]  Dept Fax: [phone]      Orthopaedic Trauma Clinic Follow Up      Subjective:   Date of Surgery: 11/9/2022  1. Left femur open treatment with intramedullary nail insertion. 2.  Irrigation and excisional debridement of skin down to and including  the bone of left segmental fibular fracture. 3.  Left ankle stress examination under anesthesia with independent  interpretation and imaging. 4.  Removal of superficial implant of left distal femur. 5.  Application of negative pressure wound VAC, left lower leg. 6.  Application of tissue expander, left leg. Date of Surgery: 11/11/2022  1. Left fibula open reduction internal fixation. 2.  Left Achilles tendon repair. 3.  Left leg irrigation, excisional debridement of skin down to and  including the bone of wounds measuring 20 x 8 cm, and 11 x 5 cm. 4.  Left leg tissue expander application. 5.  Left ankle complex wound closure 11 x 5 cm. 6.  Left leg wound VAC application. 7.  Open reduction internal fixation left clavicle. 8.  Open reduction internal fixation right radial shaft. 9.  Left radial head closed treatment. Date of Surgery: 11/15/2022  1. Left leg irrigation, excisional debridement of skin down to and  including the bone of left leg wound measuring 20 x 8 cm. 2.  Open reduction and internal fixation of left unicondylar tibial  plateau fracture. 3.  Revision open reduction and internal fixation of left fibula. 4.  Application of acellular matrix xenograft of left leg measuring 17 x  8 cm. 5.  Application of negative pressure wound VAC. 6.  Drainage of hemarthrosis, left knee      Date of Surgery: 12/06/2022  1. Left lower extremity split-thickness skin grafting 20 x 8 cm. 2.  Preparation of graft bed at 20 x 8 cm.       Braeden Villagran is a 38y.o. year old male who presents to the clinic today for follow up status post the above no back pain, no gout, no musculoskeletal pain, no neck pain, and no weakness.

## 2023-05-22 ENCOUNTER — PATIENT MESSAGE (OUTPATIENT)
Dept: ORTHOPEDIC SURGERY | Age: 43
End: 2023-05-22

## 2023-05-22 DIAGNOSIS — S42.022D CLOSED DISPLACED FRACTURE OF SHAFT OF LEFT CLAVICLE WITH ROUTINE HEALING, SUBSEQUENT ENCOUNTER: ICD-10-CM

## 2023-05-22 DIAGNOSIS — S82.122D CLOSED FRACTURE OF LATERAL PORTION OF LEFT TIBIAL PLATEAU WITH ROUTINE HEALING, SUBSEQUENT ENCOUNTER: ICD-10-CM

## 2023-05-22 DIAGNOSIS — S86.012D RUPTURE OF LEFT ACHILLES TENDON, SUBSEQUENT ENCOUNTER: ICD-10-CM

## 2023-05-22 DIAGNOSIS — S72.352D CLOSED DISPLACED COMMINUTED FRACTURE OF SHAFT OF LEFT FEMUR WITH ROUTINE HEALING, SUBSEQUENT ENCOUNTER: ICD-10-CM

## 2023-05-22 RX ORDER — CYCLOBENZAPRINE HCL 10 MG
10 TABLET ORAL 2 TIMES DAILY PRN
Qty: 30 TABLET | Refills: 0 | Status: SHIPPED | OUTPATIENT
Start: 2023-05-22

## 2023-05-22 RX ORDER — NAPROXEN 500 MG/1
500 TABLET ORAL 2 TIMES DAILY WITH MEALS
Qty: 60 TABLET | Refills: 0 | Status: SHIPPED | OUTPATIENT
Start: 2023-05-22 | End: 2023-06-21

## 2023-05-22 NOTE — TELEPHONE ENCOUNTER
Medication request, medication pended      Date of Surgery: 11/9/2022  1. Left femur open treatment with intramedullary nail insertion. 2.  Irrigation and excisional debridement of skin down to and including  the bone of left segmental fibular fracture. 3.  Left ankle stress examination under anesthesia with independent  interpretation and imaging. 4.  Removal of superficial implant of left distal femur. 5.  Application of negative pressure wound VAC, left lower leg. 6.  Application of tissue expander, left leg. Date of Surgery: 11/11/2022  1. Left fibula open reduction internal fixation. 2.  Left Achilles tendon repair. 3.  Left leg irrigation, excisional debridement of skin down to and  including the bone of wounds measuring 20 x 8 cm, and 11 x 5 cm. 4.  Left leg tissue expander application. 5.  Left ankle complex wound closure 11 x 5 cm. 6.  Left leg wound VAC application. 7.  Open reduction internal fixation left clavicle. 8.  Open reduction internal fixation right radial shaft. 9.  Left radial head closed treatment. Date of Surgery: 11/15/2022  1. Left leg irrigation, excisional debridement of skin down to and  including the bone of left leg wound measuring 20 x 8 cm. 2.  Open reduction and internal fixation of left unicondylar tibial  plateau fracture. 3.  Revision open reduction and internal fixation of left fibula. 4.  Application of acellular matrix xenograft of left leg measuring 17 x  8 cm. 5.  Application of negative pressure wound VAC. 6.  Drainage of hemarthrosis, left knee        Date of Surgery: 12/06/2022  1. Left lower extremity split-thickness skin grafting 20 x 8 cm. 2.  Preparation of graft bed at 20 x 8 cm.

## 2023-05-23 NOTE — TELEPHONE ENCOUNTER
From: Jose Roberto Tran  To: Dr. Kris Nichols: 5/22/2023 12:30 PM EDT  Subject: Pain Management    Have you received an approval or denial for pain management?

## 2023-06-01 ENCOUNTER — PATIENT MESSAGE (OUTPATIENT)
Dept: ORTHOPEDIC SURGERY | Age: 43
End: 2023-06-01

## 2023-06-01 DIAGNOSIS — S82.122D CLOSED FRACTURE OF LATERAL PORTION OF LEFT TIBIAL PLATEAU WITH ROUTINE HEALING, SUBSEQUENT ENCOUNTER: ICD-10-CM

## 2023-06-01 DIAGNOSIS — G58.9 NERVE PALSY: ICD-10-CM

## 2023-06-01 DIAGNOSIS — S86.012D RUPTURE OF LEFT ACHILLES TENDON, SUBSEQUENT ENCOUNTER: ICD-10-CM

## 2023-06-01 DIAGNOSIS — S42.022D CLOSED DISPLACED FRACTURE OF SHAFT OF LEFT CLAVICLE WITH ROUTINE HEALING, SUBSEQUENT ENCOUNTER: ICD-10-CM

## 2023-06-01 DIAGNOSIS — S52.301D CLOSED FRACTURE OF SHAFT OF RIGHT RADIUS WITH ROUTINE HEALING, UNSPECIFIED FRACTURE MORPHOLOGY, SUBSEQUENT ENCOUNTER: ICD-10-CM

## 2023-06-01 DIAGNOSIS — S72.352D CLOSED DISPLACED COMMINUTED FRACTURE OF SHAFT OF LEFT FEMUR WITH ROUTINE HEALING, SUBSEQUENT ENCOUNTER: Primary | ICD-10-CM

## 2023-06-01 DIAGNOSIS — R52 PAIN: ICD-10-CM

## 2023-06-01 NOTE — TELEPHONE ENCOUNTER
From: Yarelis Gonzalez  To: Dr. Aleida Sotelo: 6/1/2023 1:51 PM EDT  Subject: Medication    We still have no word or approval for pain management. Is he able to have a prescription once a month until he gets in? If not, please advise of his options. We have tried multiple combinations of over the counter medications. They are not helping much. At this point, he will be lottie if he gets into a pain management facility before he comes back for his follow up. My only other option is to take him to the ER and I don't know if [employer] will pay for that. He has only been taking 1 Oxycodone per day, sometimes only half, sometimes a whole one. Please advise.
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
## 2023-06-02 RX ORDER — OXYCODONE HYDROCHLORIDE AND ACETAMINOPHEN 5; 325 MG/1; MG/1
1 TABLET ORAL EVERY 8 HOURS PRN
Qty: 15 TABLET | Refills: 0 | Status: SHIPPED | OUTPATIENT
Start: 2023-06-02 | End: 2023-06-07

## 2023-06-25 DIAGNOSIS — S72.352D CLOSED DISPLACED COMMINUTED FRACTURE OF SHAFT OF LEFT FEMUR WITH ROUTINE HEALING, SUBSEQUENT ENCOUNTER: ICD-10-CM

## 2023-06-25 DIAGNOSIS — S42.022D CLOSED DISPLACED FRACTURE OF SHAFT OF LEFT CLAVICLE WITH ROUTINE HEALING, SUBSEQUENT ENCOUNTER: ICD-10-CM

## 2023-06-25 DIAGNOSIS — S86.012D RUPTURE OF LEFT ACHILLES TENDON, SUBSEQUENT ENCOUNTER: ICD-10-CM

## 2023-06-25 DIAGNOSIS — S82.122D CLOSED FRACTURE OF LATERAL PORTION OF LEFT TIBIAL PLATEAU WITH ROUTINE HEALING, SUBSEQUENT ENCOUNTER: ICD-10-CM

## 2023-06-27 RX ORDER — CYCLOBENZAPRINE HCL 10 MG
10 TABLET ORAL 2 TIMES DAILY PRN
Qty: 30 TABLET | Refills: 0 | Status: SHIPPED | OUTPATIENT
Start: 2023-06-27

## 2023-07-07 ENCOUNTER — TELEPHONE (OUTPATIENT)
Dept: ORTHOPEDIC SURGERY | Age: 43
End: 2023-07-07

## 2023-07-07 NOTE — TELEPHONE ENCOUNTER
YVONNE  Received call from Johnathon Mckee, patient significant other that patient was admitted to hospital in [place] for MRSA. Erica Chen for more information. Unable to reach.

## 2023-07-24 DIAGNOSIS — S82.122D CLOSED FRACTURE OF LATERAL PORTION OF LEFT TIBIAL PLATEAU WITH ROUTINE HEALING, SUBSEQUENT ENCOUNTER: ICD-10-CM

## 2023-07-24 DIAGNOSIS — S42.022D CLOSED DISPLACED FRACTURE OF SHAFT OF LEFT CLAVICLE WITH ROUTINE HEALING, SUBSEQUENT ENCOUNTER: ICD-10-CM

## 2023-07-24 DIAGNOSIS — S86.012D RUPTURE OF LEFT ACHILLES TENDON, SUBSEQUENT ENCOUNTER: ICD-10-CM

## 2023-07-24 DIAGNOSIS — S72.352D CLOSED DISPLACED COMMINUTED FRACTURE OF SHAFT OF LEFT FEMUR WITH ROUTINE HEALING, SUBSEQUENT ENCOUNTER: ICD-10-CM

## 2023-07-24 RX ORDER — NAPROXEN 500 MG/1
500 TABLET ORAL 2 TIMES DAILY WITH MEALS
Qty: 60 TABLET | Refills: 0 | Status: SHIPPED | OUTPATIENT
Start: 2023-07-24 | End: 2023-08-23

## 2023-07-24 RX ORDER — CYCLOBENZAPRINE HCL 10 MG
10 TABLET ORAL 2 TIMES DAILY PRN
Qty: 30 TABLET | Refills: 0 | Status: SHIPPED | OUTPATIENT
Start: 2023-07-24

## 2023-08-29 ENCOUNTER — PATIENT MESSAGE (OUTPATIENT)
Dept: ORTHOPEDIC SURGERY | Age: 43
End: 2023-08-29

## 2023-08-29 NOTE — TELEPHONE ENCOUNTER
From: Johnny Ribera  To: Dr. Robyn Childress: 8/29/2023 10:12 AM EDT  Subject: Occupational and Physical Therapy    Can you please send a request for therapy. He was unable to attend the last batch due to MRSA and other poor health. We need to get him back in. I don't know if they will extend the previous ones or will need a new request.   It is [facility]. The number is [phone]. You should have the fax number on file. Thank you.

## 2023-08-29 NOTE — TELEPHONE ENCOUNTER
Spoke with Desi Vazquez from [facility]. She states we can send a request to extend visits without seeing this patient. [facility], spoke with Jeffrey Guerrero. Jeffrey Guerrero states this patient just stopped coming to PT/OT due to transportation issues. The  office reached out to the patient multiple times and were never able to reach the patient to get him in for therapy. He went to  2 OT sessions therefor has 10 visits left. He went to PT for 3 sessions therefor has 9 visits left. Delisa from [facility] says even though the patient just stopped coming to therapy and did not notify their office, nor return any calls to the office, they will still see this patient if extension is approved.

## 2023-09-08 DIAGNOSIS — S72.352D CLOSED DISPLACED COMMINUTED FRACTURE OF SHAFT OF LEFT FEMUR WITH ROUTINE HEALING, SUBSEQUENT ENCOUNTER: Primary | ICD-10-CM

## 2023-09-08 DIAGNOSIS — S86.012D RUPTURE OF LEFT ACHILLES TENDON, SUBSEQUENT ENCOUNTER: ICD-10-CM

## 2023-09-08 DIAGNOSIS — S82.122D CLOSED FRACTURE OF LATERAL PORTION OF LEFT TIBIAL PLATEAU WITH ROUTINE HEALING, SUBSEQUENT ENCOUNTER: ICD-10-CM

## 2023-09-08 DIAGNOSIS — S52.301D CLOSED FRACTURE OF SHAFT OF RIGHT RADIUS WITH ROUTINE HEALING, UNSPECIFIED FRACTURE MORPHOLOGY, SUBSEQUENT ENCOUNTER: ICD-10-CM

## 2023-09-08 DIAGNOSIS — S42.022D CLOSED DISPLACED FRACTURE OF SHAFT OF LEFT CLAVICLE WITH ROUTINE HEALING, SUBSEQUENT ENCOUNTER: ICD-10-CM

## 2023-09-08 DIAGNOSIS — G58.9 NERVE PALSY: ICD-10-CM

## 2023-09-18 ENCOUNTER — TELEPHONE (OUTPATIENT)
Dept: ORTHOPEDIC SURGERY | Age: 43
End: 2023-09-18

## 2023-09-25 ENCOUNTER — OFFICE VISIT (OUTPATIENT)
Dept: ORTHOPEDIC SURGERY | Age: 43
End: 2023-09-25

## 2023-09-25 VITALS — WEIGHT: 238 LBS | HEIGHT: 69 IN | BODY MASS INDEX: 35.25 KG/M2

## 2023-09-25 DIAGNOSIS — S72.352D CLOSED DISPLACED COMMINUTED FRACTURE OF SHAFT OF LEFT FEMUR WITH ROUTINE HEALING, SUBSEQUENT ENCOUNTER: Primary | ICD-10-CM

## 2023-09-25 DIAGNOSIS — S52.301D CLOSED FRACTURE OF SHAFT OF RIGHT RADIUS WITH ROUTINE HEALING, UNSPECIFIED FRACTURE MORPHOLOGY, SUBSEQUENT ENCOUNTER: ICD-10-CM

## 2023-09-25 DIAGNOSIS — S42.022D CLOSED DISPLACED FRACTURE OF SHAFT OF LEFT CLAVICLE WITH ROUTINE HEALING, SUBSEQUENT ENCOUNTER: ICD-10-CM

## 2023-09-25 DIAGNOSIS — S81.812A LACERATION OF LEFT LOWER EXTREMITY, INITIAL ENCOUNTER: ICD-10-CM

## 2023-09-25 RX ORDER — OXYCODONE HYDROCHLORIDE AND ACETAMINOPHEN 5; 325 MG/1; MG/1
1 TABLET ORAL EVERY 6 HOURS PRN
Qty: 28 TABLET | Refills: 0 | Status: SHIPPED | OUTPATIENT
Start: 2023-09-25 | End: 2023-10-02

## 2023-09-25 RX ORDER — NAPROXEN 500 MG/1
500 TABLET ORAL 2 TIMES DAILY WITH MEALS
Qty: 180 TABLET | Refills: 1 | Status: SHIPPED | OUTPATIENT
Start: 2023-09-25

## 2023-09-25 RX ORDER — CYCLOBENZAPRINE HCL 10 MG
10 TABLET ORAL 3 TIMES DAILY PRN
Qty: 30 TABLET | Refills: 0 | Status: SHIPPED | OUTPATIENT
Start: 2023-09-25 | End: 2023-10-05

## 2023-09-25 RX ORDER — SULFAMETHOXAZOLE AND TRIMETHOPRIM 800; 160 MG/1; MG/1
1 TABLET ORAL 2 TIMES DAILY
Qty: 20 TABLET | Refills: 0 | Status: SHIPPED | OUTPATIENT
Start: 2023-09-25 | End: 2023-10-05

## 2023-09-25 NOTE — PROGRESS NOTES
protocol that optimize mobility and function. Patient was provided with updated prescriptions of Percocet, Flexeril, naproxen, and Augmentin. Strongly encouraged patient to establish care with referrals provided today as listed above. Patient is to follow-up in 4 weeks time for repeat repeat wound evaluation and encouraged to call if any any further documentation to achieve approval for the recommended care with the have prescribed today. Patient understood and agreed with the plan with all questions being answered to the patient's satisfaction at the time of visit. No new imaging upon follow up. Follow up:Return in about 4 weeks (around 10/23/2023). Orders Placed This Encounter   Medications    oxyCODONE-acetaminophen (PERCOCET) 5-325 MG per tablet     Sig: Take 1 tablet by mouth every 6 hours as needed for Pain for up to 7 days. Intended supply: 7 days.  Take lowest dose possible to manage pain Max Daily Amount: 4 tablets     Dispense:  28 tablet     Refill:  0     Reduce doses taken as pain becomes manageable    sulfamethoxazole-trimethoprim (BACTRIM DS;SEPTRA DS) 800-160 MG per tablet     Sig: Take 1 tablet by mouth 2 times daily for 10 days     Dispense:  20 tablet     Refill:  0    cyclobenzaprine (FLEXERIL) 10 MG tablet     Sig: Take 1 tablet by mouth 3 times daily as needed for Muscle spasms     Dispense:  30 tablet     Refill:  0    naproxen (NAPROSYN) 500 MG tablet     Sig: Take 1 tablet by mouth 2 times daily (with meals)     Dispense:  180 tablet     Refill:  1          Orders Placed This Encounter   Procedures    XR FEMUR LEFT (MIN 2 VIEWS)     Standing Status:   Future     Number of Occurrences:   1     Standing Expiration Date:   9/22/2024     Order Specific Question:   Reason for exam:     Answer:   monitor    XR Clavicle Left     Standing Status:   Future     Number of Occurrences:   1     Standing Expiration Date:   9/22/2024     Order Specific Question:   Reason for exam:

## 2023-09-26 ENCOUNTER — PATIENT MESSAGE (OUTPATIENT)
Dept: ORTHOPEDIC SURGERY | Age: 43
End: 2023-09-26

## 2023-10-25 ENCOUNTER — OFFICE VISIT (OUTPATIENT)
Dept: ORTHOPEDIC SURGERY | Age: 43
End: 2023-10-25
Payer: COMMERCIAL

## 2023-10-25 VITALS — HEIGHT: 69 IN | BODY MASS INDEX: 35.25 KG/M2 | WEIGHT: 238 LBS

## 2023-10-25 DIAGNOSIS — S81.802D WOUND OF LEFT LOWER EXTREMITY, SUBSEQUENT ENCOUNTER: Primary | ICD-10-CM

## 2023-10-25 PROCEDURE — 99214 OFFICE O/P EST MOD 30 MIN: CPT | Performed by: STUDENT IN AN ORGANIZED HEALTH CARE EDUCATION/TRAINING PROGRAM

## 2023-10-25 RX ORDER — NAPROXEN 500 MG/1
500 TABLET ORAL 2 TIMES DAILY WITH MEALS
Qty: 20 TABLET | Refills: 0 | Status: SHIPPED | OUTPATIENT
Start: 2023-10-25 | End: 2023-11-04

## 2023-10-25 RX ORDER — CYCLOBENZAPRINE HCL 10 MG
10 TABLET ORAL
Qty: 50 TABLET | Refills: 0 | Status: SHIPPED | OUTPATIENT
Start: 2023-10-25 | End: 2023-11-04

## 2023-10-25 NOTE — PROGRESS NOTES
wound communicates down to the hardware for the tibial plateau ORIF. We discussed potential treatment options of this with continued conservative management with antibiotics and local wound care versus returning to the operating room for irrigation debridement and hardware removal given that his tibial plateau is likely healed. Given the risks and benefits of surgery the patient would like to elect for removal of hardware and irrigation debridement. He will meet with surgery scheduling today to discuss operative intervention. We will have him continue with wound management to his distal leg wound which will be further evaluated intraoperatively. 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 It is also important that he get extension splinting for his left hand. We provided a note today directly for occupational therapy to fit him for an extension splint.    - Encouraged the patient to reach out to psychiatry for referral and evaluation to help cope with the lingering disability from his initial injuries. - Continue to follow-up with pain management to identify a treatment plan that maximizes mobility and function.    - Prescriptions refilled today for naproxen and Flexeril.    - The patient will follow-up in our office 1 to 2 weeks following surgical intervention         Follow up:Return 1 to 2 weeks following surgical intervention. Orders Placed This Encounter   Medications    naproxen (NAPROSYN) 500 MG tablet     Sig: Take 1 tablet by mouth 2 times daily (with meals) for 10 days     Dispense:  20 tablet     Refill:  0    cyclobenzaprine (FLEXERIL) 10 MG tablet     Sig: Take 1 tablet by mouth every 4-6 hours as needed for Muscle spasms     Dispense:  50 tablet     Refill:  0          No orders of the defined types were placed in this encounter.       Electronically signed by Adele Lucero

## 2023-10-26 ENCOUNTER — TELEPHONE (OUTPATIENT)
Dept: ORTHOPEDIC SURGERY | Age: 43
End: 2023-10-26

## 2023-10-26 NOTE — TELEPHONE ENCOUNTER
Received another request for an HS hand splint for patient. C9 previously faxed on 10/10/2023. Called Desi Vazquez from [address] [place] [address] to confirm if ever received. Desi Vazquez stated it was not received. Confirmed fax number [phone]. Sent.

## 2023-10-27 ENCOUNTER — HOSPITAL ENCOUNTER (OUTPATIENT)
Age: 43
Setting detail: SPECIMEN
Discharge: HOME | End: 2023-10-27

## 2023-10-27 ENCOUNTER — INITIAL CONSULT (OUTPATIENT)
Dept: PAIN MANAGEMENT | Age: 43
End: 2023-10-27

## 2023-10-27 VITALS — WEIGHT: 301 LBS | BODY MASS INDEX: 44.58 KG/M2 | HEIGHT: 69 IN

## 2023-10-27 DIAGNOSIS — S72.012A CLOSED SUBCAPITAL FRACTURE OF LEFT FEMUR, INITIAL ENCOUNTER (HCC): ICD-10-CM

## 2023-10-27 DIAGNOSIS — Z79.891 CHRONIC USE OF OPIATE FOR THERAPEUTIC PURPOSE: Primary | ICD-10-CM

## 2023-10-27 DIAGNOSIS — Z79.891 CHRONIC USE OF OPIATE FOR THERAPEUTIC PURPOSE: ICD-10-CM

## 2023-10-27 NOTE — PROGRESS NOTES
medications to improve pain, function and quality of life. The patient denies any side effects from the medications including constipation or respiratory depression. The patient reports adequate analgesia with the medication. There is no evidence of aberrant behavior. I ordered a urine drug screen at today's visit to ensure he is taking medications as prescribed. He reports taking Lyrica yesterday and Percocet 2 days ago. If appropriate, I will continue Lyrica 150 mg twice daily and start him on Tramadol 50 mg twice daily as needed. PLAN:  Medications: For nonopioid therapy, the following medications were prescribed:    -Continue medication prescribed by primary care physician    Opioid therapy:  -If appropriate, I will continue opioid medication  -Pain Treatment agreement: Sign at next visit  -Urine Drug Screen: Ordered at today's visit 10/27/2023  -OARRS reviewed and appropriate    Interventions:  -None    Imaging:  -Reviewed left tibia/fibula x-rays, reviewed left clavicle x-ray, reviewed right radius/ulna x-ray with patient in room    Behavioral Therapies:  -Continue daily stretching and home exercise program    Referrals:  -None    Follow-up Plan:  -2 weeks    Patient was offered intervention where appropriate. Multi-modal Pain Therapy: The patient was explicitly considered for multimodal and interdisciplinary therapy. Non-opioid and non-pharmacological opportunities to enhance analgesia and quality of life have been and will continue to be pursued. Opioid Therapy: Education provided to patient regarding short term and long term implications of opioid medication use. Repeat opioid risk stratification today, discussion regarding functional achievements, safe storage, and optimization of non-opioid interventional, behavioral, and pharmaceutical modalities. Will continue attempt to wean off medication as appropriate.     Jeffery Schumacher DO  Interventional Pain Management/PM&R   [facility] [place]

## 2023-10-28 LAB
6-ACETYLMORPHINE, UR: NORMAL
7-AMINOCLONAZEPAM, URINE: NORMAL
ALPHA-OH-ALPRAZ, URINE: NORMAL
ALPHA-OH-MIDAZOLAM, URINE: NORMAL
ALPRAZOLAM, URINE: NORMAL
AMPHETAMINES, URINE: NORMAL
BARBITURATES, URINE: NORMAL
BENZOYLECGONINE, UR: NORMAL
BUPRENORPHINE URINE: NORMAL
CARISOPRODOL, UR: NORMAL
CLONAZEPAM, URINE: NORMAL
CODEINE, URINE: NORMAL
CREATININE URINE: NORMAL
DIAZEPAM, URINE: NORMAL
DRUGS EXPECTED, UR: NORMAL
EER HI RES INTERP UR: NORMAL
ETHYL GLUCURONIDE UR: NORMAL
FENTANYL URINE: NORMAL
GABAPENTIN: NORMAL
HYDROCODONE, OPI6M: NORMAL
HYDROMORPHONE, OPI3M: NORMAL
LORAZEPAM, URINE: NORMAL
MARIJUANA METAB, UR: NORMAL
MDA, UR: NORMAL
MDEA, EVE, UR: NORMAL
MDMA URINE: NORMAL
MEPERIDINE METAB, UR: NORMAL
METHADONE, URINE: NORMAL
METHAMPHETAMINE, URINE: NORMAL
METHYLPHENIDATE: NORMAL
MIDAZOLAM, URINE: NORMAL
MORPHINE, OPI1M: NORMAL
NALOXONE URINE: NORMAL
NORBUPRENORPHINE, URINE: NORMAL
NORDIAZEPAM, URINE: NORMAL
NORFENTANYL, URINE: NORMAL
NORHYDROCODONE, URINE: NORMAL
NOROXYCODONE, URINE: NORMAL
NOROXYMORPHONE, URINE: NORMAL
OXAZEPAM, URINE: NORMAL
OXYCODONE URINE: NORMAL
OXYMORPHONE, URINE: NORMAL
PAIN MANAGEMENT DRUG PANEL INTERP, URINE: NORMAL
PAIN MGT DRUG PANEL, HI RES, UR: NORMAL
PCP,URINE: NORMAL
PHENTERMINE, UR: NORMAL
PREGABALIN: NORMAL
TAPENTADOL, URINE: NORMAL
TAPENTADOL-O-SULFATE, URINE: NORMAL
TEMAZEPAM, URINE: NORMAL
TRAMADOL, URINE: NORMAL
ZOLPIDEM METABOLITE (ZCA), URINE: NORMAL
ZOLPIDEM, URINE: NORMAL

## 2023-10-31 LAB
6-ACETYLMORPHINE, UR: NOT DETECTED
7-AMINOCLONAZEPAM, URINE: NOT DETECTED
ALPHA-OH-ALPRAZ, URINE: NOT DETECTED
ALPHA-OH-MIDAZOLAM, URINE: NOT DETECTED
ALPRAZOLAM, URINE: NOT DETECTED
AMPHETAMINES, URINE: NOT DETECTED
BARBITURATES, URINE: NOT DETECTED
BENZOYLECGONINE, UR: NOT DETECTED
BUPRENORPHINE URINE: NOT DETECTED
CARISOPRODOL, UR: NOT DETECTED
CLONAZEPAM, URINE: NOT DETECTED
CODEINE, URINE: NOT DETECTED
CREATININE URINE: 329.4 MG/DL (ref 20–400)
DIAZEPAM, URINE: NOT DETECTED
DRUGS EXPECTED, UR: NORMAL
EER HI RES INTERP UR: NORMAL
ETHYL GLUCURONIDE UR: NOT DETECTED
FENTANYL URINE: NOT DETECTED
GABAPENTIN: NOT DETECTED
HYDROCODONE, OPI6M: NOT DETECTED
HYDROMORPHONE, OPI3M: NOT DETECTED
LORAZEPAM, URINE: NOT DETECTED
MARIJUANA METAB, UR: NOT DETECTED
MDA, UR: NOT DETECTED
MDEA, EVE, UR: NOT DETECTED
MDMA URINE: NOT DETECTED
MEPERIDINE METAB, UR: NOT DETECTED
METHADONE, URINE: NOT DETECTED
METHAMPHETAMINE, URINE: NOT DETECTED
METHYLPHENIDATE: NOT DETECTED
MIDAZOLAM, URINE: NOT DETECTED
MORPHINE, OPI1M: NOT DETECTED
NALOXONE URINE: NOT DETECTED
NORBUPRENORPHINE, URINE: NOT DETECTED
NORDIAZEPAM, URINE: NOT DETECTED
NORFENTANYL, URINE: NOT DETECTED
NORHYDROCODONE, URINE: NOT DETECTED
NOROXYCODONE, URINE: PRESENT
NOROXYMORPHONE, URINE: PRESENT
OXAZEPAM, URINE: NOT DETECTED
OXYCODONE URINE: PRESENT
OXYMORPHONE, URINE: PRESENT
PAIN MANAGEMENT DRUG PANEL INTERP, URINE: NORMAL
PAIN MGT DRUG PANEL, HI RES, UR: NORMAL
PCP,URINE: NOT DETECTED
PHENTERMINE, UR: NOT DETECTED
PREGABALIN: PRESENT
TAPENTADOL, URINE: NOT DETECTED
TAPENTADOL-O-SULFATE, URINE: NOT DETECTED
TEMAZEPAM, URINE: NOT DETECTED
TRAMADOL, URINE: NOT DETECTED
ZOLPIDEM METABOLITE (ZCA), URINE: NOT DETECTED
ZOLPIDEM, URINE: NOT DETECTED

## 2023-11-09 ENCOUNTER — TELEPHONE (OUTPATIENT)
Dept: ORTHOPEDIC SURGERY | Age: 43
End: 2023-11-09

## 2023-11-09 NOTE — TELEPHONE ENCOUNTER
Fili Neves from [facility] called in regarding request for additional PT, informed her that office had not received request and she stated that she would fax back, information received and sent to [address] [place] [address]

## 2023-11-14 ENCOUNTER — ANESTHESIA (OUTPATIENT)
Dept: OPERATING ROOM | Age: 43
End: 2023-11-14
Payer: COMMERCIAL

## 2023-11-14 ENCOUNTER — APPOINTMENT (OUTPATIENT)
Dept: GENERAL RADIOLOGY | Age: 43
End: 2023-11-14
Attending: STUDENT IN AN ORGANIZED HEALTH CARE EDUCATION/TRAINING PROGRAM
Payer: COMMERCIAL

## 2023-11-14 ENCOUNTER — ANESTHESIA EVENT (OUTPATIENT)
Dept: OPERATING ROOM | Age: 43
End: 2023-11-14
Payer: COMMERCIAL

## 2023-11-14 ENCOUNTER — HOSPITAL ENCOUNTER (OUTPATIENT)
Age: 43
Setting detail: OUTPATIENT SURGERY
Discharge: HOME | End: 2023-11-14
Attending: STUDENT IN AN ORGANIZED HEALTH CARE EDUCATION/TRAINING PROGRAM | Admitting: STUDENT IN AN ORGANIZED HEALTH CARE EDUCATION/TRAINING PROGRAM
Payer: COMMERCIAL

## 2023-11-14 VITALS
HEIGHT: 69 IN | TEMPERATURE: 98.5 F | WEIGHT: 285 LBS | HEART RATE: 80 BPM | RESPIRATION RATE: 17 BRPM | BODY MASS INDEX: 42.21 KG/M2 | OXYGEN SATURATION: 94 % | DIASTOLIC BLOOD PRESSURE: 54 MMHG | SYSTOLIC BLOOD PRESSURE: 110 MMHG

## 2023-11-14 DIAGNOSIS — T84.7XXA INFECTED HARDWARE IN LEFT LOWER EXTREMITY, INITIAL ENCOUNTER (HCC): Primary | ICD-10-CM

## 2023-11-14 PROCEDURE — 3700000000 HC ANESTHESIA ATTENDED CARE: Performed by: STUDENT IN AN ORGANIZED HEALTH CARE EDUCATION/TRAINING PROGRAM

## 2023-11-14 PROCEDURE — 2500000003 HC RX 250 WO HCPCS

## 2023-11-14 PROCEDURE — 2580000003 HC RX 258: Performed by: STUDENT IN AN ORGANIZED HEALTH CARE EDUCATION/TRAINING PROGRAM

## 2023-11-14 PROCEDURE — 6360000002 HC RX W HCPCS: Performed by: ANESTHESIOLOGY

## 2023-11-14 PROCEDURE — 87070 CULTURE OTHR SPECIMN AEROBIC: CPT

## 2023-11-14 PROCEDURE — 7100000000 HC PACU RECOVERY - FIRST 15 MIN: Performed by: STUDENT IN AN ORGANIZED HEALTH CARE EDUCATION/TRAINING PROGRAM

## 2023-11-14 PROCEDURE — 7100000011 HC PHASE II RECOVERY - ADDTL 15 MIN: Performed by: STUDENT IN AN ORGANIZED HEALTH CARE EDUCATION/TRAINING PROGRAM

## 2023-11-14 PROCEDURE — 86403 PARTICLE AGGLUT ANTBDY SCRN: CPT

## 2023-11-14 PROCEDURE — 2709999900 HC NON-CHARGEABLE SUPPLY: Performed by: STUDENT IN AN ORGANIZED HEALTH CARE EDUCATION/TRAINING PROGRAM

## 2023-11-14 PROCEDURE — 3600000005 HC SURGERY LEVEL 5 BASE: Performed by: STUDENT IN AN ORGANIZED HEALTH CARE EDUCATION/TRAINING PROGRAM

## 2023-11-14 PROCEDURE — 3600000015 HC SURGERY LEVEL 5 ADDTL 15MIN: Performed by: STUDENT IN AN ORGANIZED HEALTH CARE EDUCATION/TRAINING PROGRAM

## 2023-11-14 PROCEDURE — C1713 ANCHOR/SCREW BN/BN,TIS/BN: HCPCS | Performed by: STUDENT IN AN ORGANIZED HEALTH CARE EDUCATION/TRAINING PROGRAM

## 2023-11-14 PROCEDURE — 7100000001 HC PACU RECOVERY - ADDTL 15 MIN: Performed by: STUDENT IN AN ORGANIZED HEALTH CARE EDUCATION/TRAINING PROGRAM

## 2023-11-14 PROCEDURE — 3700000001 HC ADD 15 MINUTES (ANESTHESIA): Performed by: STUDENT IN AN ORGANIZED HEALTH CARE EDUCATION/TRAINING PROGRAM

## 2023-11-14 PROCEDURE — 87205 SMEAR GRAM STAIN: CPT

## 2023-11-14 PROCEDURE — 7100000010 HC PHASE II RECOVERY - FIRST 15 MIN: Performed by: STUDENT IN AN ORGANIZED HEALTH CARE EDUCATION/TRAINING PROGRAM

## 2023-11-14 PROCEDURE — 6360000002 HC RX W HCPCS

## 2023-11-14 PROCEDURE — 87176 TISSUE HOMOGENIZATION CULTR: CPT

## 2023-11-14 PROCEDURE — 64445 NJX AA&/STRD SCIATIC NRV IMG: CPT | Performed by: ANESTHESIOLOGY

## 2023-11-14 PROCEDURE — 2580000003 HC RX 258

## 2023-11-14 PROCEDURE — 87186 SC STD MICRODIL/AGAR DIL: CPT

## 2023-11-14 PROCEDURE — 6360000002 HC RX W HCPCS: Performed by: STUDENT IN AN ORGANIZED HEALTH CARE EDUCATION/TRAINING PROGRAM

## 2023-11-14 PROCEDURE — 87075 CULTR BACTERIA EXCEPT BLOOD: CPT

## 2023-11-14 DEVICE — STIMULAN® RAPID CURE PROVIDED STERILE FOR SINGLE PATIENT USE. STIMULAN® RAPID CURE CONTAINS CALCIUM SULFATE POWDER AND MIXING SOLUTION IN PRE-MEASURED QUANTITIES SO THAT WHEN MIXED TOGETHER IN A STERILE MIXING BOWL, THE RESULTANT PASTE IS TO BE DIGITALLY PACKED INTO OPEN BONE VOID/GAP TO SET INSITU OR PLACED INTO THE MOULD PROVIDED, THE MIXTURE SETS TO FORM BEADS. THE BIODEGRADABLE, RADIOPAQUE BEADS ARE RESORBED IN APPROXIMATELY 30 – 60 DAYS WHEN USED IN ACCORDANCE WITH THE DEVICE LABELLING. STIMULAN® RAPID CURE IS MANUFACTURED FROM SYNTHETIC IMPLANT GRADE CALCIUM SULFATE DIHYDRATE(CASO4.2H2O) THAT RESORBS AND IS REPLACED WITH BONE DURING THE HEALING PROCESS. ALSO, AS THE BONE VOID FILLER BEADS ARE BIODEGRADABLE AND BIOCOMPATIBLE, THEY MAY BE USED AT AN INFECTED SITE.
Type: IMPLANTABLE DEVICE | Site: TIBIA | Status: FUNCTIONAL
Brand: STIMULAN® RAPID CURE

## 2023-11-14 RX ORDER — CEFAZOLIN SODIUM 1 G/3ML
INJECTION, POWDER, FOR SOLUTION INTRAMUSCULAR; INTRAVENOUS PRN
Status: DISCONTINUED | OUTPATIENT
Start: 2023-11-14 | End: 2023-11-14 | Stop reason: SDUPTHER

## 2023-11-14 RX ORDER — SODIUM CHLORIDE 0.9 % (FLUSH) 0.9 %
5-40 SYRINGE (ML) INJECTION PRN
Status: DISCONTINUED | OUTPATIENT
Start: 2023-11-14 | End: 2023-11-14 | Stop reason: HOSPADM

## 2023-11-14 RX ORDER — METOCLOPRAMIDE HYDROCHLORIDE 5 MG/ML
10 INJECTION INTRAMUSCULAR; INTRAVENOUS
Status: DISCONTINUED | OUTPATIENT
Start: 2023-11-14 | End: 2023-11-14 | Stop reason: HOSPADM

## 2023-11-14 RX ORDER — ROCURONIUM BROMIDE 10 MG/ML
INJECTION, SOLUTION INTRAVENOUS PRN
Status: DISCONTINUED | OUTPATIENT
Start: 2023-11-14 | End: 2023-11-14 | Stop reason: SDUPTHER

## 2023-11-14 RX ORDER — DROPERIDOL 2.5 MG/ML
0.62 INJECTION, SOLUTION INTRAMUSCULAR; INTRAVENOUS
Status: DISCONTINUED | OUTPATIENT
Start: 2023-11-14 | End: 2023-11-14 | Stop reason: HOSPADM

## 2023-11-14 RX ORDER — HYDRALAZINE HYDROCHLORIDE 20 MG/ML
10 INJECTION INTRAMUSCULAR; INTRAVENOUS
Status: DISCONTINUED | OUTPATIENT
Start: 2023-11-14 | End: 2023-11-14 | Stop reason: HOSPADM

## 2023-11-14 RX ORDER — VANCOMYCIN HYDROCHLORIDE 1 G/20ML
INJECTION, POWDER, LYOPHILIZED, FOR SOLUTION INTRAVENOUS PRN
Status: DISCONTINUED | OUTPATIENT
Start: 2023-11-14 | End: 2023-11-14 | Stop reason: ALTCHOICE

## 2023-11-14 RX ORDER — PROPOFOL 10 MG/ML
INJECTION, EMULSION INTRAVENOUS PRN
Status: DISCONTINUED | OUTPATIENT
Start: 2023-11-14 | End: 2023-11-14 | Stop reason: SDUPTHER

## 2023-11-14 RX ORDER — BUPIVACAINE HYDROCHLORIDE 5 MG/ML
INJECTION, SOLUTION EPIDURAL; INTRACAUDAL
Status: COMPLETED | OUTPATIENT
Start: 2023-11-14 | End: 2023-11-14

## 2023-11-14 RX ORDER — LIDOCAINE HYDROCHLORIDE 10 MG/ML
INJECTION, SOLUTION EPIDURAL; INFILTRATION; INTRACAUDAL; PERINEURAL PRN
Status: DISCONTINUED | OUTPATIENT
Start: 2023-11-14 | End: 2023-11-14 | Stop reason: SDUPTHER

## 2023-11-14 RX ORDER — FENTANYL CITRATE 50 UG/ML
INJECTION, SOLUTION INTRAMUSCULAR; INTRAVENOUS PRN
Status: DISCONTINUED | OUTPATIENT
Start: 2023-11-14 | End: 2023-11-14 | Stop reason: SDUPTHER

## 2023-11-14 RX ORDER — MIDAZOLAM HYDROCHLORIDE 2 MG/2ML
2 INJECTION, SOLUTION INTRAMUSCULAR; INTRAVENOUS ONCE
Status: COMPLETED | OUTPATIENT
Start: 2023-11-14 | End: 2023-11-14

## 2023-11-14 RX ORDER — GENTAMICIN SULFATE 40 MG/ML
INJECTION, SOLUTION INTRAMUSCULAR; INTRAVENOUS PRN
Status: DISCONTINUED | OUTPATIENT
Start: 2023-11-14 | End: 2023-11-14 | Stop reason: ALTCHOICE

## 2023-11-14 RX ORDER — MEPERIDINE HYDROCHLORIDE 50 MG/ML
12.5 INJECTION INTRAMUSCULAR; INTRAVENOUS; SUBCUTANEOUS EVERY 5 MIN PRN
Status: DISCONTINUED | OUTPATIENT
Start: 2023-11-14 | End: 2023-11-14 | Stop reason: HOSPADM

## 2023-11-14 RX ORDER — SULFAMETHOXAZOLE AND TRIMETHOPRIM 800; 160 MG/1; MG/1
1 TABLET ORAL 2 TIMES DAILY
Qty: 20 TABLET | Refills: 0 | Status: SHIPPED | OUTPATIENT
Start: 2023-11-14 | End: 2023-11-24

## 2023-11-14 RX ORDER — NAPROXEN 500 MG/1
500 TABLET ORAL 2 TIMES DAILY WITH MEALS
Qty: 28 TABLET | Refills: 0 | Status: SHIPPED | OUTPATIENT
Start: 2023-11-14

## 2023-11-14 RX ORDER — SODIUM CHLORIDE 9 MG/ML
INJECTION, SOLUTION INTRAVENOUS PRN
Status: DISCONTINUED | OUTPATIENT
Start: 2023-11-14 | End: 2023-11-14 | Stop reason: HOSPADM

## 2023-11-14 RX ORDER — SODIUM CHLORIDE, SODIUM LACTATE, POTASSIUM CHLORIDE, CALCIUM CHLORIDE 600; 310; 30; 20 MG/100ML; MG/100ML; MG/100ML; MG/100ML
INJECTION, SOLUTION INTRAVENOUS CONTINUOUS PRN
Status: DISCONTINUED | OUTPATIENT
Start: 2023-11-14 | End: 2023-11-14 | Stop reason: SDUPTHER

## 2023-11-14 RX ORDER — SODIUM CHLORIDE 0.9 % (FLUSH) 0.9 %
5-40 SYRINGE (ML) INJECTION EVERY 12 HOURS SCHEDULED
Status: DISCONTINUED | OUTPATIENT
Start: 2023-11-14 | End: 2023-11-14 | Stop reason: HOSPADM

## 2023-11-14 RX ORDER — DEXAMETHASONE SODIUM PHOSPHATE 10 MG/ML
INJECTION INTRAMUSCULAR; INTRAVENOUS PRN
Status: DISCONTINUED | OUTPATIENT
Start: 2023-11-14 | End: 2023-11-14 | Stop reason: SDUPTHER

## 2023-11-14 RX ORDER — OXYCODONE HYDROCHLORIDE 5 MG/1
5 TABLET ORAL EVERY 6 HOURS PRN
Qty: 28 TABLET | Refills: 0 | Status: SHIPPED | OUTPATIENT
Start: 2023-11-14 | End: 2023-11-21

## 2023-11-14 RX ORDER — ONDANSETRON 2 MG/ML
INJECTION INTRAMUSCULAR; INTRAVENOUS PRN
Status: DISCONTINUED | OUTPATIENT
Start: 2023-11-14 | End: 2023-11-14 | Stop reason: SDUPTHER

## 2023-11-14 RX ORDER — FENTANYL CITRATE 50 UG/ML
100 INJECTION, SOLUTION INTRAMUSCULAR; INTRAVENOUS ONCE
Status: COMPLETED | OUTPATIENT
Start: 2023-11-14 | End: 2023-11-14

## 2023-11-14 RX ORDER — MAGNESIUM HYDROXIDE 1200 MG/15ML
LIQUID ORAL CONTINUOUS PRN
Status: COMPLETED | OUTPATIENT
Start: 2023-11-14 | End: 2023-11-14

## 2023-11-14 RX ORDER — DIPHENHYDRAMINE HYDROCHLORIDE 50 MG/ML
12.5 INJECTION INTRAMUSCULAR; INTRAVENOUS
Status: DISCONTINUED | OUTPATIENT
Start: 2023-11-14 | End: 2023-11-14 | Stop reason: HOSPADM

## 2023-11-14 RX ORDER — DOCUSATE SODIUM 100 MG/1
100 CAPSULE, LIQUID FILLED ORAL 2 TIMES DAILY
Qty: 20 CAPSULE | Refills: 0 | Status: SHIPPED | OUTPATIENT
Start: 2023-11-14

## 2023-11-14 RX ADMIN — MIDAZOLAM HYDROCHLORIDE 2 MG: 1 INJECTION, SOLUTION INTRAMUSCULAR; INTRAVENOUS at 09:49

## 2023-11-14 RX ADMIN — ROCURONIUM BROMIDE 10 MG: 10 INJECTION, SOLUTION INTRAVENOUS at 11:45

## 2023-11-14 RX ADMIN — FENTANYL CITRATE 50 MCG: 50 INJECTION, SOLUTION INTRAMUSCULAR; INTRAVENOUS at 10:44

## 2023-11-14 RX ADMIN — SODIUM CHLORIDE, POTASSIUM CHLORIDE, SODIUM LACTATE AND CALCIUM CHLORIDE: 600; 310; 30; 20 INJECTION, SOLUTION INTRAVENOUS at 10:50

## 2023-11-14 RX ADMIN — PHENYLEPHRINE HYDROCHLORIDE 100 MCG: 10 INJECTION INTRAVENOUS at 11:02

## 2023-11-14 RX ADMIN — CEFAZOLIN 3 G: 1 INJECTION, POWDER, FOR SOLUTION INTRAMUSCULAR; INTRAVENOUS at 10:48

## 2023-11-14 RX ADMIN — SUGAMMADEX 300 MG: 100 INJECTION, SOLUTION INTRAVENOUS at 12:16

## 2023-11-14 RX ADMIN — ROCURONIUM BROMIDE 20 MG: 10 INJECTION, SOLUTION INTRAVENOUS at 10:59

## 2023-11-14 RX ADMIN — DEXAMETHASONE SODIUM PHOSPHATE 10 MG: 10 INJECTION INTRAMUSCULAR; INTRAVENOUS at 10:48

## 2023-11-14 RX ADMIN — FENTANYL CITRATE 50 MCG: 50 INJECTION, SOLUTION INTRAMUSCULAR; INTRAVENOUS at 10:41

## 2023-11-14 RX ADMIN — PROPOFOL 300 MG: 10 INJECTION, EMULSION INTRAVENOUS at 10:31

## 2023-11-14 RX ADMIN — FENTANYL CITRATE 100 MCG: 50 INJECTION INTRAMUSCULAR; INTRAVENOUS at 09:49

## 2023-11-14 RX ADMIN — LIDOCAINE HYDROCHLORIDE 50 MG: 10 INJECTION, SOLUTION EPIDURAL; INFILTRATION; INTRACAUDAL; PERINEURAL at 10:31

## 2023-11-14 RX ADMIN — ONDANSETRON 4 MG: 2 INJECTION INTRAMUSCULAR; INTRAVENOUS at 11:59

## 2023-11-14 RX ADMIN — BUPIVACAINE HYDROCHLORIDE 40 ML: 5 INJECTION, SOLUTION EPIDURAL; INTRACAUDAL; PERINEURAL at 09:49

## 2023-11-14 RX ADMIN — ROCURONIUM BROMIDE 10 MG: 10 INJECTION, SOLUTION INTRAVENOUS at 11:30

## 2023-11-14 RX ADMIN — SODIUM CHLORIDE, POTASSIUM CHLORIDE, SODIUM LACTATE AND CALCIUM CHLORIDE: 600; 310; 30; 20 INJECTION, SOLUTION INTRAVENOUS at 10:24

## 2023-11-14 RX ADMIN — ROCURONIUM BROMIDE 50 MG: 10 INJECTION, SOLUTION INTRAVENOUS at 10:31

## 2023-11-14 ASSESSMENT — PAIN SCALES - GENERAL
PAINLEVEL_OUTOF10: 3
PAINLEVEL_OUTOF10: 3
PAINLEVEL_OUTOF10: 8
PAINLEVEL_OUTOF10: 3

## 2023-11-14 ASSESSMENT — PAIN DESCRIPTION - LOCATION
LOCATION: LEG

## 2023-11-14 ASSESSMENT — LIFESTYLE VARIABLES: SMOKING_STATUS: 1

## 2023-11-14 NOTE — H&P
ankle, 1+ edema at ankle/lower leg. NEUROLOGIC: CN II-XII are grossly intact. Gait not assessed. IMPRESSIONS:   Infected hardware in left lower extremity, initial encounter. PLANS:   PROXIMAL TIBIA IRRIGATION AND DEBRIDEMENT, HARDWARE REMOVAL  (SYNTHES, PRE-OP NERVE BLOCK, FLAT AARON TABLE, SUPINE, C-ARM)  *SHORT STAY* - Left.     KVNG Sibley - CNP   Electronically signed 11/14/2023 at 8:13 AM

## 2023-11-14 NOTE — ANESTHESIA PROCEDURE NOTES
Peripheral Block    Patient location during procedure: pre-op  Reason for block: post-op pain management and at surgeon's request  Start time: 11/14/2023 9:49 AM  End time: 11/14/2023 9:54 AM  Staffing  Performed: anesthesiologist   Anesthesiologist: Elio Rothman MD  Performed by: Elio Rothman MD  Authorized by: Elio Rothman MD    Preanesthetic Checklist  Completed: patient identified, IV checked, site marked, risks and benefits discussed, surgical/procedural consents, equipment checked, pre-op evaluation, timeout performed, anesthesia consent given, oxygen available, monitors applied/VS acknowledged, fire risk safety assessment completed and verbalized and blood product R/B/A discussed and consented  Peripheral Block   Patient position: supine  Prep: ChloraPrep  Provider prep: mask and sterile gloves  Patient monitoring: continuous pulse ox, frequent blood pressure checks and IV access  Block type: Sciatic  Popliteal  Laterality: left  Injection technique: single-shot  Guidance: ultrasound guided    Needle   Needle type: short-bevel   Needle gauge: 20 G  Needle localization: ultrasound guidance  Needle length: 10 cm  Assessment   Injection assessment: negative aspiration for heme, no paresthesia on injection, local visualized surrounding nerve on ultrasound and no intravascular symptoms  Paresthesia pain: none  Slow fractionated injection: yes  Hemodynamics: stableno  Outcomes: uncomplicated and patient tolerated procedure well    Medications Administered  bupivacaine (MARCAINE) PF injection 0.5% - Perineural   40 mL - 11/14/2023 9:49:00 AM

## 2023-11-14 NOTE — BRIEF OP NOTE
Brief Postoperative Note      Patient: Jack Mercado  YOB: 1980  MRN: [id number]    Date of Procedure: 11/14/2023    Pre-Op Diagnosis Codes:  Infected retained hardware left tibia  Retained hardware left fibula    Post-Op Diagnosis:   Infected retained hardware left tibia  Retained hardware left fibula       Procedure(s):  Left tibia removal of deep hardware  Left fibula removal of deep hardware  Left proximal tibia saucerization  Irrigation and excisional debridement of skin down to muscle of wound 8 x 4 cm left proximal tibia  Application antibiotic eluting stimulan paste    Surgeon(s):  Joo Jewell DO    Assistant:  Resident: Lori Escalante DO    Anesthesia: General    Estimated Blood Loss (mL): 20 cc's    Fluids: 6,137 mL's    Complications: None    Specimens:   ID Type Source Tests Collected by Time Destination   1 : LEFT TIBIA SWABS  Swab Leg CULTURE, ANAEROBIC AND AEROBIC Joo Jewell,  11/14/2023 1116    2 : LEFT TIBIA TISSUE  Tissue Leg CULTURE, ANAEROBIC AND AEROBIC Joo Jewell,  11/14/2023 1139        Implants:  Implant Name Type Inv. Item Serial No.  Lot No. LRB No. Used Action   SCREW BNE L80MM DIA3. 5MM PROX TIB S STL ST FULL THRD T15 - [id number]  SCREW BNE L80MM DIA3. 5MM PROX TIB S STL ST FULL THRD T15  DEPUY [employer]  Left 2 Explanted   SCREW BNE L38MM DIA3.5MM MICHAEL S STL ST NONCANNULATED [place] - [id number]  SCREW BNE L38MM DIA3.5MM MICHAEL S STL ST NONCANNULATED [place]  DEPUY [employer]  Left 1 Explanted   SCREW BNE L44MM DIA3.5MM MICHAEL S STL ST NONCANNULATED [place] - [id number]  SCREW BNE L44MM DIA3.5MM MICHAEL S STL ST NONCANNULATED [place]  DEPUY SYNTHES [employer]  Left 1 Explanted   PLATE BNE [id number] 4 H NONSTERILE L PROX TIB S STL MARIA M ANG [place] [id number]] DEPUY SYNTHES [facility]] - [id number]  PLATE BNE [id number] 4 H NONSTERILE L PROX TIB S STL MARIA M ANG [employer]O [id number]]  DEPUY SYNTHES [employer]  Left 1 Explanted   SCREW BNE L80MM DIA3. 5MM PELV MICHAEL S STL ST THRD SM HEX -

## 2023-11-15 NOTE — OP NOTE
11/14/2023 19:00:02       T: 11/14/2023 22:40:19     [id number]  Job#: [id number]     Doc#: [id number]    CC:

## 2023-11-16 LAB
MICROORGANISM SPEC CULT: ABNORMAL
MICROORGANISM/AGENT SPEC: ABNORMAL
SPECIMEN DESCRIPTION: ABNORMAL
SPECIMEN DESCRIPTION: ABNORMAL

## 2023-11-21 DIAGNOSIS — T84.7XXA INFECTED HARDWARE IN LEFT LOWER EXTREMITY, INITIAL ENCOUNTER (HCC): ICD-10-CM

## 2023-11-21 RX ORDER — OXYCODONE HYDROCHLORIDE 5 MG/1
5 TABLET ORAL EVERY 6 HOURS PRN
Qty: 28 TABLET | Refills: 0 | Status: SHIPPED | OUTPATIENT
Start: 2023-11-21 | End: 2023-11-28

## 2023-11-21 NOTE — TELEPHONE ENCOUNTER
Pain medication refill request, medication pended. 11/14/23     OPERATIONS PERFORMED:  1. Removal of deep implant, left tibia. 2.  Removal of deep implant, left fibula. 3.  Left proximal tibia saucerization. 4.  Irrigation and excisional debridement of skin down to including  muscle of 8 x 4 cm wound to left proximal tibia. 5.  Application of antibiotic-eluting Stimulan paste.

## 2023-11-22 DIAGNOSIS — T84.7XXA INFECTED HARDWARE IN LEFT LOWER EXTREMITY, INITIAL ENCOUNTER (HCC): ICD-10-CM

## 2023-11-22 RX ORDER — OXYCODONE HYDROCHLORIDE 5 MG/1
5 TABLET ORAL EVERY 6 HOURS PRN
Qty: 28 TABLET | Refills: 0 | OUTPATIENT
Start: 2023-11-22 | End: 2023-11-29

## 2023-11-29 ENCOUNTER — OFFICE VISIT (OUTPATIENT)
Dept: ORTHOPEDIC SURGERY | Age: 43
End: 2023-11-29

## 2023-11-29 VITALS — BODY MASS INDEX: 42.21 KG/M2 | WEIGHT: 285 LBS | HEIGHT: 69 IN

## 2023-11-29 DIAGNOSIS — S81.802D WOUND OF LEFT LOWER EXTREMITY, SUBSEQUENT ENCOUNTER: Primary | ICD-10-CM

## 2023-11-29 PROCEDURE — 99024 POSTOP FOLLOW-UP VISIT: CPT | Performed by: STUDENT IN AN ORGANIZED HEALTH CARE EDUCATION/TRAINING PROGRAM

## 2023-11-29 RX ORDER — NAPROXEN 500 MG/1
500 TABLET ORAL 2 TIMES DAILY WITH MEALS
Qty: 28 TABLET | Refills: 0 | Status: SHIPPED | OUTPATIENT
Start: 2023-11-29

## 2023-11-29 RX ORDER — OXYCODONE HYDROCHLORIDE AND ACETAMINOPHEN 5; 325 MG/1; MG/1
1 TABLET ORAL EVERY 6 HOURS PRN
Qty: 28 TABLET | Refills: 0 | Status: SHIPPED | OUTPATIENT
Start: 2023-11-29 | End: 2023-12-06

## 2023-11-29 RX ORDER — GABAPENTIN 100 MG/1
100 CAPSULE ORAL 3 TIMES DAILY
Qty: 42 CAPSULE | Refills: 0 | Status: SHIPPED | OUTPATIENT
Start: 2023-11-29 | End: 2023-12-13

## 2023-11-29 RX ORDER — CYCLOBENZAPRINE HCL 10 MG
10 TABLET ORAL 3 TIMES DAILY PRN
Qty: 30 TABLET | Refills: 0 | Status: SHIPPED | OUTPATIENT
Start: 2023-11-29 | End: 2023-12-09

## 2023-12-07 ENCOUNTER — PATIENT MESSAGE (OUTPATIENT)
Dept: ORTHOPEDIC SURGERY | Age: 43
End: 2023-12-07

## 2023-12-07 DIAGNOSIS — T84.7XXA INFECTED HARDWARE IN LEFT LOWER EXTREMITY, INITIAL ENCOUNTER (HCC): ICD-10-CM

## 2023-12-08 RX ORDER — OXYCODONE HYDROCHLORIDE 5 MG/1
5 TABLET ORAL EVERY 6 HOURS PRN
Qty: 28 TABLET | Refills: 0 | Status: SHIPPED | OUTPATIENT
Start: 2023-12-08 | End: 2023-12-15

## 2023-12-08 NOTE — TELEPHONE ENCOUNTER
From: Carl Young  To: Dr. Mackenzie Ellington: 12/7/2023 2:56 PM EST  Subject: Medication refill    He is requesting refill of pain medication to take him to his pain management appointment on 12/15. He still has enough probably through the weekend sometime. If not, we understand. Isotretinoin Counseling: Patient should get monthly blood tests, not donate blood, not drive at night if vision affected, not share medication, and not undergo elective surgery for 6 months after tx completed. Side effects reviewed, pt to contact office should one occur. Minocycline Pregnancy And Lactation Text: This medication is Pregnancy Category D and not consider safe during pregnancy. It is also excreted in breast milk. Include Pregnancy/Lactation Warning?: No Spironolactone Counseling: Patient advised regarding risks of diarrhea, abdominal pain, hyperkalemia, birth defects (for female patients), liver toxicity and renal toxicity. The patient may need blood work to monitor liver and kidney function and potassium levels while on therapy. The patient verbalized understanding of the proper use and possible adverse effects of spironolactone.  All of the patient's questions and concerns were addressed. Dapsone Counseling: I discussed with the patient the risks of dapsone including but not limited to hemolytic anemia, agranulocytosis, rashes, methemoglobinemia, kidney failure, peripheral neuropathy, headaches, GI upset, and liver toxicity.  Patients who start dapsone require monitoring including baseline LFTs and weekly CBCs for the first month, then every month thereafter.  The patient verbalized understanding of the proper use and possible adverse effects of dapsone.  All of the patient's questions and concerns were addressed. Topical Retinoid counseling:  Patient advised to apply a pea-sized amount only at bedtime and wait 30 minutes after washing their face before applying.  If too drying, patient may add a non-comedogenic moisturizer. The patient verbalized understanding of the proper use and possible adverse effects of retinoids.  All of the patient's questions and concerns were addressed. Erythromycin Pregnancy And Lactation Text: This medication is Pregnancy Category B and is considered safe during pregnancy. It is also excreted in breast milk. Detail Level: Detailed Tetracycline Counseling: Patient counseled regarding possible photosensitivity and increased risk for sunburn.  Patient instructed to avoid sunlight, if possible.  When exposed to sunlight, patients should wear protective clothing, sunglasses, and sunscreen.  The patient was instructed to call the office immediately if the following severe adverse effects occur:  hearing changes, easy bruising/bleeding, severe headache, or vision changes.  The patient verbalized understanding of the proper use and possible adverse effects of tetracycline.  All of the patient's questions and concerns were addressed. Patient understands to avoid pregnancy while on therapy due to potential birth defects. Detail Level: Simple Topical Clindamycin Pregnancy And Lactation Text: This medication is Pregnancy Category B and is considered safe during pregnancy. It is unknown if it is excreted in breast milk. Benzoyl Peroxide Pregnancy And Lactation Text: This medication is Pregnancy Category C. It is unknown if benzoyl peroxide is excreted in breast milk. Birth Control Pills Counseling: Birth Control Pill Counseling: I discussed with the patient the potential side effects of OCPs including but not limited to increased risk of stroke, heart attack, thrombophlebitis, deep venous thrombosis, hepatic adenomas, breast changes, GI upset, headaches, and depression.  The patient verbalized understanding of the proper use and possible adverse effects of OCPs. All of the patient's questions and concerns were addressed. Bactrim Pregnancy And Lactation Text: This medication is Pregnancy Category D and is known to cause fetal risk.  It is also excreted in breast milk. Topical Retinoid Pregnancy And Lactation Text: This medication is Pregnancy Category C. It is unknown if this medication is excreted in breast milk. Topical Sulfur Applications Counseling: Topical Sulfur Counseling: Patient counseled that this medication may cause skin irritation or allergic reactions.  In the event of skin irritation, the patient was advised to reduce the amount of the drug applied or use it less frequently.   The patient verbalized understanding of the proper use and possible adverse effects of topical sulfur application.  All of the patient's questions and concerns were addressed. Dapsone Pregnancy And Lactation Text: This medication is Pregnancy Category C and is not considered safe during pregnancy or breast feeding. Isotretinoin Pregnancy And Lactation Text: This medication is Pregnancy Category X and is considered extremely dangerous during pregnancy. It is unknown if it is excreted in breast milk. Sarecycline Counseling: Patient advised regarding possible photosensitivity and discoloration of the teeth, skin, lips, tongue and gums.  Patient instructed to avoid sunlight, if possible.  When exposed to sunlight, patients should wear protective clothing, sunglasses, and sunscreen.  The patient was instructed to call the office immediately if the following severe adverse effects occur:  hearing changes, easy bruising/bleeding, severe headache, or vision changes.  The patient verbalized understanding of the proper use and possible adverse effects of sarecycline.  All of the patient's questions and concerns were addressed. Benzoyl Peroxide Counseling: Patient counseled that medicine may cause skin irritation and bleach clothing.  In the event of skin irritation, the patient was advised to reduce the amount of the drug applied or use it less frequently.   The patient verbalized understanding of the proper use and possible adverse effects of benzoyl peroxide.  All of the patient's questions and concerns were addressed. Tazorac Pregnancy And Lactation Text: This medication is not safe during pregnancy. It is unknown if this medication is excreted in breast milk. Tazorac Counseling:  Patient advised that medication is irritating and drying.  Patient may need to apply sparingly and wash off after an hour before eventually leaving it on overnight.  The patient verbalized understanding of the proper use and possible adverse effects of tazorac.  All of the patient's questions and concerns were addressed. Birth Control Pills Pregnancy And Lactation Text: This medication should be avoided if pregnant and for the first 30 days post-partum. Minocycline Counseling: Patient advised regarding possible photosensitivity and discoloration of the teeth, skin, lips, tongue and gums.  Patient instructed to avoid sunlight, if possible.  When exposed to sunlight, patients should wear protective clothing, sunglasses, and sunscreen.  The patient was instructed to call the office immediately if the following severe adverse effects occur:  hearing changes, easy bruising/bleeding, severe headache, or vision changes.  The patient verbalized understanding of the proper use and possible adverse effects of minocycline.  All of the patient's questions and concerns were addressed. Spironolactone Pregnancy And Lactation Text: This medication can cause feminization of the male fetus and should be avoided during pregnancy. The active metabolite is also found in breast milk. High Dose Vitamin A Counseling: Side effects reviewed, pt to contact office should one occur. Topical Sulfur Applications Pregnancy And Lactation Text: This medication is Pregnancy Category C and has an unknown safety profile during pregnancy. It is unknown if this topical medication is excreted in breast milk. Azithromycin Pregnancy And Lactation Text: This medication is considered safe during pregnancy and is also secreted in breast milk. Azithromycin Counseling:  I discussed with the patient the risks of azithromycin including but not limited to GI upset, allergic reaction, drug rash, diarrhea, and yeast infections. Bactrim Counseling:  I discussed with the patient the risks of sulfa antibiotics including but not limited to GI upset, allergic reaction, drug rash, diarrhea, dizziness, photosensitivity, and yeast infections.  Rarely, more serious reactions can occur including but not limited to aplastic anemia, agranulocytosis, methemoglobinemia, blood dyscrasias, liver or kidney failure, lung infiltrates or desquamative/blistering drug rashes. Doxycycline Pregnancy And Lactation Text: This medication is Pregnancy Category D and not consider safe during pregnancy. It is also excreted in breast milk but is considered safe for shorter treatment courses. Erythromycin Counseling:  I discussed with the patient the risks of erythromycin including but not limited to GI upset, allergic reaction, drug rash, diarrhea, increase in liver enzymes, and yeast infections. Doxycycline Counseling:  Patient counseled regarding possible photosensitivity and increased risk for sunburn.  Patient instructed to avoid sunlight, if possible.  When exposed to sunlight, patients should wear protective clothing, sunglasses, and sunscreen.  The patient was instructed to call the office immediately if the following severe adverse effects occur:  hearing changes, easy bruising/bleeding, severe headache, or vision changes.  The patient verbalized understanding of the proper use and possible adverse effects of doxycycline.  All of the patient's questions and concerns were addressed. Topical Clindamycin Counseling: Patient counseled that this medication may cause skin irritation or allergic reactions.  In the event of skin irritation, the patient was advised to reduce the amount of the drug applied or use it less frequently.   The patient verbalized understanding of the proper use and possible adverse effects of clindamycin.  All of the patient's questions and concerns were addressed. High Dose Vitamin A Pregnancy And Lactation Text: High dose vitamin A therapy is contraindicated during pregnancy and breast feeding.

## 2023-12-15 ENCOUNTER — OFFICE VISIT (OUTPATIENT)
Dept: PAIN MANAGEMENT | Age: 43
End: 2023-12-15

## 2023-12-15 VITALS — BODY MASS INDEX: 42.21 KG/M2 | WEIGHT: 285 LBS | HEIGHT: 69 IN

## 2023-12-15 DIAGNOSIS — S72.012A CLOSED SUBCAPITAL FRACTURE OF LEFT FEMUR, INITIAL ENCOUNTER (HCC): Primary | ICD-10-CM

## 2023-12-15 DIAGNOSIS — Z79.891 CHRONIC USE OF OPIATE FOR THERAPEUTIC PURPOSE: ICD-10-CM

## 2023-12-15 RX ORDER — TRAMADOL HYDROCHLORIDE 50 MG/1
50 TABLET ORAL 2 TIMES DAILY PRN
Qty: 60 TABLET | Refills: 0 | Status: SHIPPED | OUTPATIENT
Start: 2023-12-15 | End: 2024-02-13

## 2023-12-15 RX ORDER — GABAPENTIN 100 MG/1
100 CAPSULE ORAL 2 TIMES DAILY
Qty: 180 CAPSULE | Refills: 0 | Status: SHIPPED | OUTPATIENT
Start: 2023-12-15 | End: 2024-03-14

## 2024-01-12 ENCOUNTER — TELEPHONE (OUTPATIENT)
Dept: ORTHOPEDIC SURGERY | Age: 44
End: 2024-01-12

## 2024-01-12 ENCOUNTER — OFFICE VISIT (OUTPATIENT)
Dept: PAIN MANAGEMENT | Age: 44
End: 2024-01-12

## 2024-01-12 VITALS — HEIGHT: 69 IN | BODY MASS INDEX: 42.21 KG/M2 | WEIGHT: 285 LBS

## 2024-01-12 DIAGNOSIS — S72.012A CLOSED SUBCAPITAL FRACTURE OF LEFT FEMUR, INITIAL ENCOUNTER (HCC): ICD-10-CM

## 2024-01-12 DIAGNOSIS — Z79.891 CHRONIC USE OF OPIATE FOR THERAPEUTIC PURPOSE: Primary | ICD-10-CM

## 2024-01-12 RX ORDER — TRAMADOL HYDROCHLORIDE 50 MG/1
50 TABLET ORAL 2 TIMES DAILY PRN
Qty: 60 TABLET | Refills: 0 | Status: CANCELLED | OUTPATIENT
Start: 2024-01-12 | End: 2024-03-12

## 2024-01-12 RX ORDER — TRAMADOL HYDROCHLORIDE 50 MG/1
50 TABLET ORAL 2 TIMES DAILY PRN
Qty: 60 TABLET | Refills: 0 | Status: SHIPPED | OUTPATIENT
Start: 2024-01-14 | End: 2024-03-14

## 2024-01-12 ASSESSMENT — ENCOUNTER SYMPTOMS
SHORTNESS OF BREATH: 0
CONSTIPATION: 0
COUGH: 0

## 2024-01-12 NOTE — TELEPHONE ENCOUNTER
Vanessa from [facility] called in regarding patient f/u appointment, returned call and sent in [employer] 14 and last OVN

## 2024-02-05 DIAGNOSIS — S72.012A CLOSED SUBCAPITAL FRACTURE OF LEFT FEMUR, INITIAL ENCOUNTER (HCC): ICD-10-CM

## 2024-02-05 RX ORDER — GABAPENTIN 100 MG/1
100 CAPSULE ORAL 3 TIMES DAILY
Qty: 90 CAPSULE | Refills: 0 | Status: SHIPPED | OUTPATIENT
Start: 2024-02-05 | End: 2024-03-06

## 2024-02-13 ENCOUNTER — TELEPHONE (OUTPATIENT)
Dept: PAIN MANAGEMENT | Age: 44
End: 2024-02-13

## 2024-02-13 NOTE — TELEPHONE ENCOUNTER
Pt needs refill on tramadol next apt 2/19 appt on 2/12 canceled due to pt being sick, please advise

## 2024-02-14 RX ORDER — NAPROXEN 500 MG/1
500 TABLET ORAL 2 TIMES DAILY WITH MEALS
Qty: 28 TABLET | Refills: 0 | OUTPATIENT
Start: 2024-02-14

## 2024-02-14 NOTE — TELEPHONE ENCOUNTER
Call pain management or PCP  for medication refills. No answer. Left [facility].

## 2024-02-15 DIAGNOSIS — S72.012A CLOSED SUBCAPITAL FRACTURE OF LEFT FEMUR, INITIAL ENCOUNTER (HCC): ICD-10-CM

## 2024-02-16 DIAGNOSIS — S72.012A CLOSED SUBCAPITAL FRACTURE OF LEFT FEMUR, INITIAL ENCOUNTER (HCC): ICD-10-CM

## 2024-02-16 RX ORDER — TRAMADOL HYDROCHLORIDE 50 MG/1
50 TABLET ORAL 2 TIMES DAILY PRN
Qty: 60 TABLET | Refills: 0 | OUTPATIENT
Start: 2024-02-16 | End: 2024-04-16

## 2024-02-19 ENCOUNTER — OFFICE VISIT (OUTPATIENT)
Dept: PAIN MANAGEMENT | Age: 44
End: 2024-02-19
Payer: COMMERCIAL

## 2024-02-19 VITALS — BODY MASS INDEX: 41.47 KG/M2 | WEIGHT: 280 LBS | HEIGHT: 69 IN

## 2024-02-19 DIAGNOSIS — G89.29 CHRONIC PAIN OF LEFT LOWER EXTREMITY: ICD-10-CM

## 2024-02-19 DIAGNOSIS — Z79.891 CHRONIC USE OF OPIATE FOR THERAPEUTIC PURPOSE: Primary | ICD-10-CM

## 2024-02-19 DIAGNOSIS — M79.605 CHRONIC PAIN OF LEFT LOWER EXTREMITY: ICD-10-CM

## 2024-02-19 DIAGNOSIS — S72.012A CLOSED SUBCAPITAL FRACTURE OF LEFT FEMUR, INITIAL ENCOUNTER (HCC): ICD-10-CM

## 2024-02-19 PROCEDURE — 99213 OFFICE O/P EST LOW 20 MIN: CPT | Performed by: NURSE PRACTITIONER

## 2024-02-19 RX ORDER — GABAPENTIN 100 MG/1
100 CAPSULE ORAL 3 TIMES DAILY
Qty: 90 CAPSULE | Refills: 2 | Status: SHIPPED | OUTPATIENT
Start: 2024-02-19 | End: 2024-05-19

## 2024-02-19 RX ORDER — CYCLOBENZAPRINE HCL 10 MG
10 TABLET ORAL 2 TIMES DAILY PRN
Qty: 60 TABLET | Refills: 2 | Status: SHIPPED | OUTPATIENT
Start: 2024-02-19 | End: 2024-05-19

## 2024-02-19 RX ORDER — TRAMADOL HYDROCHLORIDE 50 MG/1
50 TABLET ORAL 2 TIMES DAILY PRN
Qty: 60 TABLET | Refills: 0 | Status: SHIPPED | OUTPATIENT
Start: 2024-02-19 | End: 2024-04-19

## 2024-02-19 ASSESSMENT — ENCOUNTER SYMPTOMS
SHORTNESS OF BREATH: 0
POOR WOUND HEALING: 1
CONSTIPATION: 0
COUGH: 0

## 2024-02-19 NOTE — PROGRESS NOTES
Relevant Medications    traMADol (ULTRAM) 50 MG tablet    gabapentin (NEURONTIN) 100 MG capsule    cyclobenzaprine (FLEXERIL) 10 MG tablet               Treatment Plan:  Patient relates current medications are helping the pain. Patient reports taking pain medications as prescribed, denies obtaining medications from different sources and denies use of illegal drugs. Medication risk and benefits have been discussed. Patient denies side effects from medications like nausea, vomiting, constipation or drowsiness. Patient reports current activities of daily living are possible due to medications and would like to continue them.     As always, we encourage daily stretching and strengthening exercises, and recommend minimizing use of pain medications unless patient cannot get through daily activities due to pain.     Due to the high risk nature of this patient's pain medication close monitoring is required.   Continue current medication management, pt has been stable and compliant.  Script written for tramadol   He continues to follow with wound care once a week  He has started PT and awaiting approval from [facility] for more sessions  Follow up appointment made for 4 weeks    I have reviewed the chief complaint and history of present illness (including ROS and PFSH) and vital documentation by my staff and I agree with their documentation and have added where applicable.

## 2024-02-29 ENCOUNTER — PATIENT MESSAGE (OUTPATIENT)
Dept: ORTHOPEDIC SURGERY | Age: 44
End: 2024-02-29

## 2024-02-29 DIAGNOSIS — S42.022D CLOSED DISPLACED FRACTURE OF SHAFT OF LEFT CLAVICLE WITH ROUTINE HEALING, SUBSEQUENT ENCOUNTER: ICD-10-CM

## 2024-02-29 DIAGNOSIS — S82.122D CLOSED FRACTURE OF LATERAL PORTION OF LEFT TIBIAL PLATEAU WITH ROUTINE HEALING, SUBSEQUENT ENCOUNTER: ICD-10-CM

## 2024-02-29 DIAGNOSIS — G58.9 NERVE PALSY: Primary | ICD-10-CM

## 2024-02-29 DIAGNOSIS — S52.301D CLOSED FRACTURE OF SHAFT OF RIGHT RADIUS WITH ROUTINE HEALING, UNSPECIFIED FRACTURE MORPHOLOGY, SUBSEQUENT ENCOUNTER: ICD-10-CM

## 2024-02-29 NOTE — TELEPHONE ENCOUNTER
From: Karl Thomas  To: Dr. Inderjit Moreno  Sent: 2/29/2024 11:25 AM EST  Subject: Occupational and Physical Therapy    Could we please get a new C9 sent for OT and PT? Dr. Moreno stated at our last appointment that he could still send the C9's. Please advise if possible.

## 2024-03-21 ENCOUNTER — HOSPITAL ENCOUNTER (OUTPATIENT)
Age: 44
Setting detail: SPECIMEN
Discharge: HOME | End: 2024-03-21

## 2024-03-21 ENCOUNTER — OFFICE VISIT (OUTPATIENT)
Dept: PAIN MANAGEMENT | Age: 44
End: 2024-03-21

## 2024-03-21 VITALS — WEIGHT: 280 LBS | HEIGHT: 69 IN | BODY MASS INDEX: 41.47 KG/M2

## 2024-03-21 DIAGNOSIS — S72.012A CLOSED SUBCAPITAL FRACTURE OF LEFT FEMUR, INITIAL ENCOUNTER (HCC): ICD-10-CM

## 2024-03-21 DIAGNOSIS — M79.605 CHRONIC PAIN OF LEFT LOWER EXTREMITY: Primary | ICD-10-CM

## 2024-03-21 DIAGNOSIS — G89.29 CHRONIC PAIN OF LEFT LOWER EXTREMITY: Primary | ICD-10-CM

## 2024-03-21 DIAGNOSIS — Z79.891 CHRONIC USE OF OPIATE FOR THERAPEUTIC PURPOSE: ICD-10-CM

## 2024-03-21 RX ORDER — GABAPENTIN 100 MG/1
CAPSULE ORAL
Qty: 150 CAPSULE | Refills: 0 | Status: SHIPPED | OUTPATIENT
Start: 2024-03-21 | End: 2024-06-19

## 2024-03-21 RX ORDER — TRAMADOL HYDROCHLORIDE 50 MG/1
50 TABLET ORAL 2 TIMES DAILY PRN
Qty: 60 TABLET | Refills: 0 | Status: SHIPPED | OUTPATIENT
Start: 2024-03-21 | End: 2024-05-20

## 2024-03-21 ASSESSMENT — ENCOUNTER SYMPTOMS
CONSTIPATION: 0
COUGH: 0
POOR WOUND HEALING: 1

## 2024-03-21 NOTE — PROGRESS NOTES
capsule     Other Visit Diagnoses       Chronic pain of left lower extremity    -  Primary    Relevant Medications    traMADol (ULTRAM) 50 MG tablet    gabapentin (NEURONTIN) 100 MG capsule    Other Relevant Orders    DRUG SCREEN, PAIN    Chronic use of opiate for therapeutic purpose                   Treatment Plan:  Patient relates current medications are helping the pain. Patient reports taking pain medications as prescribed, denies obtaining medications from different sources and denies use of illegal drugs. Medication risk and benefits have been discussed. Patient denies side effects from medications like nausea, vomiting, constipation or drowsiness. Patient reports current activities of daily living are possible due to medications and would like to continue them.     As always, we encourage daily stretching and strengthening exercises, and recommend minimizing use of pain medications unless patient cannot get through daily activities due to pain.     Due to the high risk nature of this patient's pain medication close monitoring is required.   Continue current medication management, pt has been stable and compliant.  Script written for tramadol  Increase gabapentin to 100 mg BID and 300 mg HS  Awaiting [facility] approval for more PT sessions  Pt continues to follow with wound care once a week  UDS for standard monitoring purposes  Follow up appointment made for 4 weeks    I have reviewed the chief complaint and history of present illness (including ROS and PFSH) and vital documentation by my staff and I agree with their documentation and have added where applicable.

## 2024-03-22 DIAGNOSIS — Z79.891 CHRONIC USE OF OPIATE FOR THERAPEUTIC PURPOSE: ICD-10-CM

## 2024-03-26 LAB
6-ACETYLMORPHINE, UR: NOT DETECTED
7-AMINOCLONAZEPAM, URINE: NOT DETECTED
ALPHA-OH-ALPRAZ, URINE: NOT DETECTED
ALPHA-OH-MIDAZOLAM, URINE: NOT DETECTED
ALPRAZOLAM, URINE: NOT DETECTED
AMPHETAMINES, URINE: NOT DETECTED
BARBITURATES, URINE: NEGATIVE
BENZOYLECGONINE, UR: NEGATIVE
BUPRENORPHINE URINE: NOT DETECTED
CARISOPRODOL, UR: NEGATIVE
CLONAZEPAM, URINE: NOT DETECTED
CODEINE, URINE: NOT DETECTED
CREAT UR-MCNC: 73.5 MG/DL (ref 20–400)
DIAZEPAM, URINE: NOT DETECTED
DRUGS EXPECTED, UR: NORMAL
EER HI RES INTERP UR: NORMAL
ETHYL GLUCURONIDE UR: NEGATIVE
FENTANYL URINE: NOT DETECTED
GABAPENTIN: PRESENT
HYDROCODONE, URINE: NOT DETECTED
HYDROMORPHONE, URINE: NOT DETECTED
LORAZEPAM, URINE: NOT DETECTED
MARIJUANA METAB, UR: NEGATIVE
MDA, UR: NOT DETECTED
MDEA, EVE, UR: NOT DETECTED
MDMA URINE: NOT DETECTED
MEPERIDINE METAB, UR: NOT DETECTED
METHADONE, URINE: NEGATIVE
METHAMPHETAMINE, URINE: NOT DETECTED
METHYLPHENIDATE: NOT DETECTED
MIDAZOLAM, URINE: NOT DETECTED
MORPHINE, OPI1M: NOT DETECTED
NALOXONE URINE: NOT DETECTED
NORBUPRENORPHINE, URINE: NOT DETECTED
NORDIAZEPAM, URINE: NOT DETECTED
NORFENTANYL, URINE: NOT DETECTED
NORHYDROCODONE, URINE: NOT DETECTED
NOROXYCODONE, URINE: NOT DETECTED
NOROXYMORPHONE, URINE: NOT DETECTED
OXAZEPAM, URINE: NOT DETECTED
OXYCODONE URINE: NOT DETECTED
OXYMORPHONE, URINE: NOT DETECTED
PAIN MANAGEMENT DRUG PANEL INTERP, URINE: NORMAL
PAIN MGT DRUG PANEL, HI RES, UR: NORMAL
PCP,URINE: NEGATIVE
PHENTERMINE, UR: NOT DETECTED
PREGABALIN: NOT DETECTED
TAPENTADOL, URINE: NOT DETECTED
TAPENTADOL-O-SULFATE, URINE: NOT DETECTED
TEMAZEPAM, URINE: NOT DETECTED
TRAMADOL, URINE: NORMAL
ZOLPIDEM METABOLITE (ZCA), URINE: NOT DETECTED
ZOLPIDEM, URINE: NOT DETECTED

## 2024-04-17 ENCOUNTER — PATIENT MESSAGE (OUTPATIENT)
Dept: ORTHOPEDIC SURGERY | Age: 44
End: 2024-04-17

## 2024-04-17 NOTE — TELEPHONE ENCOUNTER
From: Karl Thomas  To: Dr. Inderjit Moreno  Sent: 4/17/2024 11:04 AM EDT  Subject: Appointment    Good morning,   [employer] is requesting that Karl be seen for a follow up to continue his payment. The last time he was seen was in November and they will not honor the [employer] 14 through the June date without a visit. He is have increased pain and numbness in the left foot and ankle. Can we please get an appointment scheduled get the [employer] 14 updated? After that we will get him into a physician in our area to continue evaluation and treatment. We just don't have enough time to get him scheduled and established in the timeframe we have. Please advise.

## 2024-04-19 ENCOUNTER — OFFICE VISIT (OUTPATIENT)
Dept: PAIN MANAGEMENT | Age: 44
End: 2024-04-19

## 2024-04-19 VITALS — HEIGHT: 68 IN | BODY MASS INDEX: 42.44 KG/M2 | WEIGHT: 280 LBS

## 2024-04-19 DIAGNOSIS — G89.29 CHRONIC PAIN OF LEFT LOWER EXTREMITY: ICD-10-CM

## 2024-04-19 DIAGNOSIS — M79.605 CHRONIC PAIN OF LEFT LOWER EXTREMITY: ICD-10-CM

## 2024-04-19 DIAGNOSIS — S72.012A CLOSED SUBCAPITAL FRACTURE OF LEFT FEMUR, INITIAL ENCOUNTER (HCC): Primary | ICD-10-CM

## 2024-04-19 DIAGNOSIS — Z79.891 CHRONIC USE OF OPIATE FOR THERAPEUTIC PURPOSE: ICD-10-CM

## 2024-04-19 RX ORDER — TRAMADOL HYDROCHLORIDE 50 MG/1
50 TABLET ORAL 2 TIMES DAILY PRN
Qty: 60 TABLET | Refills: 0 | Status: SHIPPED | OUTPATIENT
Start: 2024-04-22 | End: 2024-06-21
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
## 2024-04-26 ENCOUNTER — OFFICE VISIT (OUTPATIENT)
Dept: ORTHOPEDIC SURGERY | Age: 44
End: 2024-04-26
Payer: COMMERCIAL

## 2024-04-26 VITALS — WEIGHT: 280 LBS | BODY MASS INDEX: 42.44 KG/M2 | HEIGHT: 68 IN

## 2024-04-26 DIAGNOSIS — S94.92XA: ICD-10-CM

## 2024-04-26 DIAGNOSIS — G58.9 NERVE PALSY: ICD-10-CM

## 2024-04-26 DIAGNOSIS — M24.542 FLEXION CONTRACTURE OF JOINT OF HAND, LEFT: Primary | ICD-10-CM

## 2024-04-26 PROCEDURE — 99215 OFFICE O/P EST HI 40 MIN: CPT | Performed by: STUDENT IN AN ORGANIZED HEALTH CARE EDUCATION/TRAINING PROGRAM
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
## 2024-05-15 ENCOUNTER — TELEPHONE (OUTPATIENT)
Dept: ORTHOPEDIC SURGERY | Age: 44
End: 2024-05-15

## 2024-05-15 NOTE — TELEPHONE ENCOUNTER
Received call from Chelo @ [facility] requesting to speak to nurse about pts C9 for outpatient therapy.    Call back#: [phone]

## 2024-05-16 NOTE — TELEPHONE ENCOUNTER
Called and spoke with Chelo, inquiring if you want pt to continue with PT as at his last office visit approval was submitted for a continuation of OT only. Please advise, if PT is to be continued a new C9 will need to be submitted.

## 2024-05-16 NOTE — TELEPHONE ENCOUNTER
Called and spoke with Vanessa withdrew 5/2/24 OT C9 for date extension on previously approved 8 visit date extension.called and informed Chelo PT. Noted I would fax date extension approval once received.  Fax: [phone]

## 2024-05-20 DIAGNOSIS — S72.012A CLOSED SUBCAPITAL FRACTURE OF LEFT FEMUR, INITIAL ENCOUNTER (HCC): ICD-10-CM

## 2024-05-20 RX ORDER — TRAMADOL HYDROCHLORIDE 50 MG/1
50 TABLET ORAL 2 TIMES DAILY PRN
Qty: 60 TABLET | Refills: 0 | OUTPATIENT
Start: 2024-05-20 | End: 2024-07-19

## 2024-05-22 ENCOUNTER — TELEPHONE (OUTPATIENT)
Dept: PAIN MANAGEMENT | Age: 44
End: 2024-05-22

## 2024-05-22 DIAGNOSIS — S72.012A CLOSED SUBCAPITAL FRACTURE OF LEFT FEMUR, INITIAL ENCOUNTER (HCC): ICD-10-CM

## 2024-05-22 RX ORDER — TRAMADOL HYDROCHLORIDE 50 MG/1
50 TABLET ORAL 2 TIMES DAILY PRN
Qty: 60 TABLET | Refills: 0 | Status: SHIPPED | OUTPATIENT
Start: 2024-05-22 | End: 2024-06-21

## 2024-05-22 NOTE — TELEPHONE ENCOUNTER
Regarding: Medication  Contact: [phone]  ----- Message from Mariaa Goldstein MA sent at 5/22/2024 10:57 AM EDT -----       ----- Message from Karl Thomas to Samson Raines DO sent at 5/21/2024  1:56 PM -----   Good morning, just checking on the status of my Tramadol refill. Please advise if there is a problem. Thank you

## 2024-05-31 ENCOUNTER — TELEPHONE (OUTPATIENT)
Dept: PAIN MANAGEMENT | Age: 44
End: 2024-05-31

## 2024-05-31 ENCOUNTER — PATIENT MESSAGE (OUTPATIENT)
Dept: ORTHOPEDIC SURGERY | Age: 44
End: 2024-05-31

## 2024-05-31 DIAGNOSIS — S72.012A CLOSED SUBCAPITAL FRACTURE OF LEFT FEMUR, INITIAL ENCOUNTER (HCC): ICD-10-CM

## 2024-05-31 RX ORDER — GABAPENTIN 100 MG/1
CAPSULE ORAL
Qty: 150 CAPSULE | Refills: 0 | OUTPATIENT
Start: 2024-05-31 | End: 2024-08-29

## 2024-05-31 NOTE — TELEPHONE ENCOUNTER
From: Karl Thomas  To: Dr. Inderjit Moreno  Sent: 5/31/2024 2:12 PM EDT  Subject: Occupational Therapy    Could you please send the referral and a copy of the approved C9 to the hand specialist? The fax number is [phone]. I have been trying to get this scheduled for weeks. Please advise.

## 2024-06-03 DIAGNOSIS — S72.012A CLOSED SUBCAPITAL FRACTURE OF LEFT FEMUR, INITIAL ENCOUNTER (HCC): ICD-10-CM

## 2024-06-04 ENCOUNTER — TELEPHONE (OUTPATIENT)
Dept: PAIN MANAGEMENT | Age: 44
End: 2024-06-04

## 2024-06-04 RX ORDER — GABAPENTIN 100 MG/1
CAPSULE ORAL
Qty: 150 CAPSULE | Refills: 2 | Status: SHIPPED | OUTPATIENT
Start: 2024-06-04 | End: 2024-09-02

## 2024-06-04 NOTE — TELEPHONE ENCOUNTER
Karl GIBSON called for a refill on Gabapentin 100 mg patient next appt is 06/17/2024 please advise.

## 2024-06-17 ENCOUNTER — HOSPITAL ENCOUNTER (OUTPATIENT)
Age: 44
Setting detail: SPECIMEN
Discharge: HOME | End: 2024-06-17

## 2024-06-17 ENCOUNTER — OFFICE VISIT (OUTPATIENT)
Dept: PAIN MANAGEMENT | Age: 44
End: 2024-06-17

## 2024-06-17 VITALS — BODY MASS INDEX: 42.44 KG/M2 | WEIGHT: 280 LBS | HEIGHT: 68 IN

## 2024-06-17 DIAGNOSIS — Z79.891 CHRONIC USE OF OPIATE FOR THERAPEUTIC PURPOSE: ICD-10-CM

## 2024-06-17 DIAGNOSIS — G89.29 CHRONIC PAIN OF LEFT LOWER EXTREMITY: Primary | ICD-10-CM

## 2024-06-17 DIAGNOSIS — S72.012A CLOSED SUBCAPITAL FRACTURE OF LEFT FEMUR, INITIAL ENCOUNTER (HCC): ICD-10-CM

## 2024-06-17 DIAGNOSIS — M79.605 CHRONIC PAIN OF LEFT LOWER EXTREMITY: Primary | ICD-10-CM

## 2024-06-17 RX ORDER — TRAMADOL HYDROCHLORIDE 50 MG/1
50 TABLET ORAL 2 TIMES DAILY PRN
Qty: 60 TABLET | Refills: 0 | Status: SHIPPED | OUTPATIENT
Start: 2024-06-21 | End: 2024-07-21

## 2024-06-17 RX ORDER — CYCLOBENZAPRINE HCL 10 MG
10 TABLET ORAL 2 TIMES DAILY PRN
Qty: 60 TABLET | Refills: 0 | Status: SHIPPED | OUTPATIENT
Start: 2024-06-17 | End: 2024-09-15

## 2024-06-17 ASSESSMENT — ENCOUNTER SYMPTOMS
SHORTNESS OF BREATH: 0
COUGH: 0
CONSTIPATION: 0
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
## 2024-06-21 LAB
6-ACETYLMORPHINE, UR: NOT DETECTED
7-AMINOCLONAZEPAM, URINE: NOT DETECTED
ALPHA-OH-ALPRAZ, URINE: NOT DETECTED
ALPHA-OH-MIDAZOLAM, URINE: NOT DETECTED
ALPRAZOLAM, URINE: NOT DETECTED
AMPHETAMINE, URINE: NOT DETECTED
BARBITURATES, URINE: NEGATIVE
BENZOYLECGONINE, UR: NEGATIVE
BUPRENORPHINE URINE: NOT DETECTED
CARISOPRODOL, UR: NEGATIVE
CLONAZEPAM, URINE: NOT DETECTED
CODEINE, URINE: NOT DETECTED
CREAT UR-MCNC: 84.6 MG/DL (ref 20–400)
DIAZEPAM, URINE: NOT DETECTED
DRUGS EXPECTED, UR: NORMAL
EER HI RES INTERP UR: NORMAL
ETHYL GLUCURONIDE UR: NEGATIVE
FENTANYL URINE: NOT DETECTED
GABAPENTIN: PRESENT
HYDROCODONE, URINE: NOT DETECTED
HYDROMORPHONE, URINE: NOT DETECTED
LORAZEPAM, URINE: NOT DETECTED
MARIJUANA METAB, UR: NEGATIVE
MDA, UR: NOT DETECTED
MDEA, EVE, UR: NOT DETECTED
MDMA URINE: NOT DETECTED
MEPERIDINE METAB, UR: NOT DETECTED
METHADONE, URINE: NEGATIVE
METHAMPHETAMINE, URINE: NOT DETECTED
METHYLPHENIDATE: NOT DETECTED
MIDAZOLAM, URINE: NOT DETECTED
MORPHINE, OPI1M: NOT DETECTED
NALOXONE URINE: NOT DETECTED
NORBUPRENORPHINE, URINE: NOT DETECTED
NORDIAZEPAM, URINE: NOT DETECTED
NORFENTANYL, URINE: NOT DETECTED
NORHYDROCODONE, URINE: NOT DETECTED
NOROXYCODONE, URINE: NOT DETECTED
NOROXYMORPHONE, URINE: NOT DETECTED
OXAZEPAM, URINE: NOT DETECTED
OXYCODONE URINE: NOT DETECTED
OXYMORPHONE, URINE: NOT DETECTED
PAIN MANAGEMENT DRUG PANEL INTERP, URINE: NORMAL
PAIN MGT DRUG PANEL, HI RES, UR: NORMAL
PCP,URINE: NEGATIVE
PHENTERMINE, UR: NOT DETECTED
PREGABALIN: NOT DETECTED
TAPENTADOL, URINE: NOT DETECTED
TAPENTADOL-O-SULFATE, URINE: NOT DETECTED
TEMAZEPAM, URINE: NOT DETECTED
TRAMADOL, URINE: NORMAL
ZOLPIDEM METABOLITE (ZCA), URINE: NOT DETECTED
ZOLPIDEM, URINE: NOT DETECTED

## 2024-07-10 ENCOUNTER — PATIENT MESSAGE (OUTPATIENT)
Dept: ORTHOPEDIC SURGERY | Age: 44
End: 2024-07-10

## 2024-07-15 DIAGNOSIS — S72.012A CLOSED SUBCAPITAL FRACTURE OF LEFT FEMUR, INITIAL ENCOUNTER (HCC): ICD-10-CM

## 2024-07-15 RX ORDER — TRAMADOL HYDROCHLORIDE 50 MG/1
50 TABLET ORAL 2 TIMES DAILY PRN
Qty: 60 TABLET | Refills: 0 | OUTPATIENT
Start: 2024-07-15 | End: 2024-08-14

## 2024-07-19 ENCOUNTER — TELEPHONE (OUTPATIENT)
Dept: PAIN MANAGEMENT | Age: 44
End: 2024-07-19

## 2024-07-24 DIAGNOSIS — S72.012A CLOSED SUBCAPITAL FRACTURE OF LEFT FEMUR, INITIAL ENCOUNTER (HCC): ICD-10-CM

## 2024-07-24 RX ORDER — TRAMADOL HYDROCHLORIDE 50 MG/1
50 TABLET ORAL 2 TIMES DAILY PRN
Qty: 60 TABLET | Refills: 0 | Status: SHIPPED | OUTPATIENT
Start: 2024-07-24 | End: 2024-08-23

## 2024-08-02 ENCOUNTER — TELEPHONE (OUTPATIENT)
Dept: ORTHOPEDIC SURGERY | Age: 44
End: 2024-08-02

## 2024-08-08 NOTE — TELEPHONE ENCOUNTER
Patients partner Chetna called to reschedule appointment due to provider being out of office. Pre service offered first avail 09/13/24 they are wanting to see if pt can be seen sooner due to workers comp issues.  Please return call  Thank you

## 2024-08-08 NOTE — TELEPHONE ENCOUNTER
LVM notifying them of appt changed to [place] location. Informed to CB if this appointment change doesn't work out.

## 2024-08-13 ENCOUNTER — PATIENT MESSAGE (OUTPATIENT)
Dept: ORTHOPEDIC SURGERY | Age: 44
End: 2024-08-13

## 2024-08-19 ENCOUNTER — OFFICE VISIT (OUTPATIENT)
Dept: PAIN MANAGEMENT | Age: 44
End: 2024-08-19
Payer: COMMERCIAL

## 2024-08-19 VITALS — WEIGHT: 280 LBS | HEIGHT: 68 IN | BODY MASS INDEX: 42.44 KG/M2

## 2024-08-19 DIAGNOSIS — Z79.891 CHRONIC USE OF OPIATE FOR THERAPEUTIC PURPOSE: ICD-10-CM

## 2024-08-19 DIAGNOSIS — G89.29 CHRONIC PAIN OF LEFT LOWER EXTREMITY: Primary | ICD-10-CM

## 2024-08-19 DIAGNOSIS — S72.012A CLOSED SUBCAPITAL FRACTURE OF LEFT FEMUR, INITIAL ENCOUNTER (HCC): ICD-10-CM

## 2024-08-19 DIAGNOSIS — M79.605 CHRONIC PAIN OF LEFT LOWER EXTREMITY: Primary | ICD-10-CM

## 2024-08-19 PROCEDURE — 99213 OFFICE O/P EST LOW 20 MIN: CPT | Performed by: NURSE PRACTITIONER

## 2024-08-19 RX ORDER — TRAMADOL HYDROCHLORIDE 50 MG/1
50 TABLET ORAL 2 TIMES DAILY PRN
Qty: 60 TABLET | Refills: 0 | Status: SHIPPED | OUTPATIENT
Start: 2024-08-23 | End: 2024-09-22

## 2024-08-19 ASSESSMENT — ENCOUNTER SYMPTOMS
SHORTNESS OF BREATH: 0
COUGH: 0
CONSTIPATION: 0
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
## 2024-08-26 ENCOUNTER — OFFICE VISIT (OUTPATIENT)
Dept: ORTHOPEDIC SURGERY | Age: 44
End: 2024-08-26

## 2024-08-26 VITALS — HEIGHT: 68 IN | RESPIRATION RATE: 14 BRPM | WEIGHT: 245 LBS | BODY MASS INDEX: 37.13 KG/M2

## 2024-08-26 DIAGNOSIS — M24.542 FLEXION CONTRACTURE OF JOINT OF HAND, LEFT: Primary | ICD-10-CM

## 2024-08-26 NOTE — PROGRESS NOTES
[facility]  [address]  [place] [address]  Dept Phone: [phone]  Dept Fax: [phone]      Orthopaedic Trauma Clinic Follow Up      Subjective:   Worker's Comp claim #: [id number]   Date of Surgery: 11/14/2023   -Left tibial hardware removal with saucerization     Karl Thomas is a 43 y.o. year old male who presents to the clinic today for follow up status post multiple procedures, but the most recent being the above listed procedure.  Patient presents to clinic today with his wife.  At last encounter, patient was sent to a [facility] approved hand therapist in [place].  Patient and patient's wife state overall this did not help very much.  They did attempt to work on range of motion of his hand, with little overall relief and little overall improvement of his hand function.  He did a total of 12 visits.  He still has some residual discomfort to his hand, as well as his left knee and ankle.  Patient was not approved for any more therapy, has not been doing formal therapy, but continues to do home therapy exercises.  Patient's wife states that the soonest they could undergo an EMG was September.  Patient is overall very frustrated with his progression.  He recently was seen by a independent medical examiner, who stated he has not fully reached his maximum medical improvement, and plans to see patient back in November.    Review of Systems  Gen: no fever, chills, malaise  CV: no chest pain or palpitations  Resp: no cough or shortness of breath  GI: no nausea, vomiting, diarrhea, or constipation  Neuro: no seizures, vertigo, or headache  Msk: Per HPI  10 remaining systems reviewed and negative    Objective :     Vitals:    08/26/24 1114   Resp: 14   Body mass index is 37.26 kg/m².  General: No acute distress, resting comfortably in the clinic  Neuro: alert. oriented  Eyes: Extra-ocular muscles intact  Pulm: Respirations unlabored and regular.  Skin: warm, well perfused  Psych:    including those of syntax and sound a like substitutions which may escape proof reading.  In such instances, actual meaning can be extrapolated by contextual diversion

## 2024-08-28 RX ORDER — CYCLOBENZAPRINE HCL 10 MG
10 TABLET ORAL 2 TIMES DAILY PRN
Qty: 60 TABLET | Refills: 0 | Status: SHIPPED | OUTPATIENT
Start: 2024-08-28 | End: 2024-11-26

## 2024-09-05 ENCOUNTER — PATIENT MESSAGE (OUTPATIENT)
Dept: ORTHOPEDIC SURGERY | Age: 44
End: 2024-09-05

## 2024-09-05 DIAGNOSIS — G58.9 NERVE PALSY: ICD-10-CM

## 2024-09-05 DIAGNOSIS — M24.542 FLEXION CONTRACTURE OF JOINT OF HAND, LEFT: Primary | ICD-10-CM

## 2024-09-05 DIAGNOSIS — R52 PAIN: ICD-10-CM

## 2024-09-05 NOTE — TELEPHONE ENCOUNTER
LOV: 08/26/2024    Per Dr. 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 \"
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
## 2024-09-12 DIAGNOSIS — S94.92XA: ICD-10-CM

## 2024-09-20 ENCOUNTER — OFFICE VISIT (OUTPATIENT)
Dept: PAIN MANAGEMENT | Age: 44
End: 2024-09-20

## 2024-09-20 VITALS — HEIGHT: 67 IN | WEIGHT: 245 LBS | BODY MASS INDEX: 38.45 KG/M2

## 2024-09-20 DIAGNOSIS — G89.29 CHRONIC PAIN OF LEFT LOWER EXTREMITY: ICD-10-CM

## 2024-09-20 DIAGNOSIS — Z79.891 CHRONIC USE OF OPIATE FOR THERAPEUTIC PURPOSE: Primary | ICD-10-CM

## 2024-09-20 DIAGNOSIS — M79.605 CHRONIC PAIN OF LEFT LOWER EXTREMITY: ICD-10-CM

## 2024-09-20 DIAGNOSIS — S72.012A CLOSED SUBCAPITAL FRACTURE OF LEFT FEMUR, INITIAL ENCOUNTER (HCC): ICD-10-CM

## 2024-09-20 RX ORDER — TRAMADOL HYDROCHLORIDE 50 MG/1
50 TABLET ORAL 2 TIMES DAILY PRN
Qty: 60 TABLET | Refills: 0 | Status: SHIPPED | OUTPATIENT
Start: 2024-09-20 | End: 2024-10-20

## 2024-09-20 RX ORDER — GABAPENTIN 100 MG/1
CAPSULE ORAL
Qty: 150 CAPSULE | Refills: 2 | Status: SHIPPED | OUTPATIENT
Start: 2024-09-20 | End: 2024-12-19

## 2024-10-16 ENCOUNTER — TELEPHONE (OUTPATIENT)
Dept: PAIN MANAGEMENT | Age: 44
End: 2024-10-16

## 2024-10-16 DIAGNOSIS — S72.012A CLOSED SUBCAPITAL FRACTURE OF LEFT FEMUR, INITIAL ENCOUNTER (HCC): ICD-10-CM

## 2024-10-16 RX ORDER — TRAMADOL HYDROCHLORIDE 50 MG/1
50 TABLET ORAL 2 TIMES DAILY PRN
Qty: 60 TABLET | Refills: 0 | Status: CANCELLED | OUTPATIENT
Start: 2024-10-16 | End: 2024-11-15

## 2024-10-17 DIAGNOSIS — S72.012A CLOSED SUBCAPITAL FRACTURE OF LEFT FEMUR, INITIAL ENCOUNTER (HCC): ICD-10-CM

## 2024-10-17 RX ORDER — TRAMADOL HYDROCHLORIDE 50 MG/1
50 TABLET ORAL 2 TIMES DAILY PRN
Qty: 60 TABLET | Refills: 0 | Status: SHIPPED | OUTPATIENT
Start: 2024-10-20 | End: 2024-11-19

## 2024-10-18 RX ORDER — CYCLOBENZAPRINE HCL 10 MG
10 TABLET ORAL 2 TIMES DAILY PRN
Qty: 60 TABLET | Refills: 0 | Status: SHIPPED | OUTPATIENT
Start: 2024-10-18 | End: 2025-01-16

## 2024-10-22 NOTE — TELEPHONE ENCOUNTER
Pt calling asking that the three meds that were sent in now be sent in to a different pharmacy. The last one no longer takes WC.    New pharmacy: [facility]   [address] [place] [id number] [phone]    Pharmacy updated in chart.

## 2024-10-23 DIAGNOSIS — S72.012A CLOSED SUBCAPITAL FRACTURE OF LEFT FEMUR, INITIAL ENCOUNTER (HCC): ICD-10-CM

## 2024-10-23 RX ORDER — GABAPENTIN 100 MG/1
CAPSULE ORAL
Qty: 150 CAPSULE | Refills: 2 | Status: SHIPPED | OUTPATIENT
Start: 2024-10-23 | End: 2025-01-21

## 2024-10-23 RX ORDER — CYCLOBENZAPRINE HCL 10 MG
10 TABLET ORAL 2 TIMES DAILY PRN
Qty: 60 TABLET | Refills: 0 | Status: SHIPPED | OUTPATIENT
Start: 2024-10-23 | End: 2025-01-21

## 2024-10-23 RX ORDER — TRAMADOL HYDROCHLORIDE 50 MG/1
50 TABLET ORAL 2 TIMES DAILY PRN
Qty: 60 TABLET | Refills: 0 | Status: SHIPPED | OUTPATIENT
Start: 2024-10-23 | End: 2024-11-22

## 2024-12-04 ENCOUNTER — PATIENT MESSAGE (OUTPATIENT)
Dept: ORTHOPEDIC SURGERY | Age: 44
End: 2024-12-04

## 2024-12-10 ENCOUNTER — TELEPHONE (OUTPATIENT)
Dept: ORTHOPEDIC SURGERY | Age: 44
End: 2024-12-10

## 2024-12-10 NOTE — TELEPHONE ENCOUNTER
Called pateint, no answer. Left patient a message to return call. He can see Holger Petersen PA-C as well.

## 2024-12-10 NOTE — TELEPHONE ENCOUNTER
Pt states that his Worker's Comp will  near the end of this month (unsure of exact date, estimates ). He says he needs paperwork to be filled out by Dr. Moreno before that date. Earliest appts in 2025 after expiration.     Please call pt to r/s or advise.  Phone: [phone]   OK to leave detailed voicemail message

## 2024-12-19 ENCOUNTER — OFFICE VISIT (OUTPATIENT)
Dept: ORTHOPEDIC SURGERY | Age: 44
End: 2024-12-19
Payer: COMMERCIAL

## 2024-12-19 VITALS — WEIGHT: 245 LBS | BODY MASS INDEX: 38.45 KG/M2 | HEIGHT: 67 IN

## 2024-12-19 DIAGNOSIS — G58.9 NERVE PALSY: ICD-10-CM

## 2024-12-19 DIAGNOSIS — G56.82 OTHER SPECIFIED MONONEUROPATHIES OF LEFT UPPER LIMB: ICD-10-CM

## 2024-12-19 DIAGNOSIS — S82.122D CLOSED FRACTURE OF LATERAL PORTION OF LEFT TIBIAL PLATEAU WITH ROUTINE HEALING, SUBSEQUENT ENCOUNTER: Primary | ICD-10-CM

## 2024-12-19 DIAGNOSIS — T84.7XXA INFECTED HARDWARE IN LEFT LOWER EXTREMITY, INITIAL ENCOUNTER (HCC): ICD-10-CM

## 2024-12-19 DIAGNOSIS — M24.542 FLEXION CONTRACTURE OF JOINT OF HAND, LEFT: ICD-10-CM

## 2024-12-19 PROCEDURE — 99214 OFFICE O/P EST MOD 30 MIN: CPT | Performed by: PHYSICIAN ASSISTANT

## 2024-12-23 NOTE — PROGRESS NOTES
[facility]  [address]  [place] [address]  Dept Phone: [phone]  Dept Fax: [phone]      Orthopaedic Trauma Worker's Comp Follow-up          Subjective:   Date of Surgery:   -11/14/2023 Left tibial hardware removal with saucerization  - 12/6/2022 Split thickness skin graft of Left lower extremity  - 11/15/2022 Tibial Plateau ORIF, left fibula elastic nail exchange, wound vac exchange  - 11/11/2022 Irrigation and Debridement of left lower extremity with ORIF left tibia and fibula, Left achilles tendon repair    [facility] number:  [id number]    Place of employment: [employer]     Karl Thomas is a 44 y.o. year old male who presents to the clinic today for follow up of above listed procedures of the left lower extremity for which they sustained at work.    Patient is currently 1 year and 1 month out from his left tibial hardware removal and over 2 years out from initial ORIF of the tibia and fibula with subsequent skin grafting.    Patient was last seen by Dr. 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  He was referred to a hand therapist in [facility] and subsequently to a hand surgeon.  Patient reports he did follow-up with both of them but minimal success with improvement of this hand issue.    He states that the hand surgeon did not recommend any surgical procedures and was referred to neurosurgery for further evaluation however he reports that the hand surgeon has been unable to provide the neurosurgery referral which is the primary reason for patient presenting today.    Patient reports he continues to have pain in the left knee and the left lower leg.  He does note a small scab within the split thickness skin graft of the left lower leg that does not occasionally drain when he soaks for long periods of time in a hot bathtub he describes this as a nonpurulent blood.  He

## 2024-12-26 ENCOUNTER — TELEPHONE (OUTPATIENT)
Dept: ORTHOPEDIC SURGERY | Age: 44
End: 2024-12-26

## 2024-12-26 NOTE — TELEPHONE ENCOUNTER
Chetna called in and left [facility] regarding [employer] 14 for patient. Returned phone call and no answer left message that med[employer] 14 was sent on 12/23/24 and also re faxed again today.

## 2025-01-16 ENCOUNTER — PATIENT MESSAGE (OUTPATIENT)
Dept: ORTHOPEDIC SURGERY | Age: 45
End: 2025-01-16

## 2025-05-07 DIAGNOSIS — S72.012A CLOSED SUBCAPITAL FRACTURE OF LEFT FEMUR, INITIAL ENCOUNTER (HCC): ICD-10-CM

## 2025-05-07 RX ORDER — GABAPENTIN 100 MG/1
CAPSULE ORAL
Qty: 150 CAPSULE | Refills: 3 | OUTPATIENT
Start: 2025-05-07

## (undated) DEVICE — 3M™ IOBAN™ 2 ANTIMICROBIAL INCISE DRAPE 6650EZ: Brand: IOBAN™ 2

## (undated) DEVICE — ZIMMER® STERILE DISPOSABLE TOURNIQUET CUFF WITH PROTECTIVE SLEEVE AND PLC, DUAL PORT, SINGLE BLADDER, 34 IN. (86 CM)

## (undated) DEVICE — DRAPE,REIN 53X77,STERILE: Brand: MEDLINE

## (undated) DEVICE — ZIMMER® STERILE DISPOSABLE TOURNIQUET CUFF WITH PROTECTIVE SLEEVE AND PLC, DUAL PORT, SINGLE BLADDER, 24 IN. (61 CM)

## (undated) DEVICE — DRAPE NEG PRSS W30.5XL26CM POLYUR ADH VAC

## (undated) DEVICE — DRESSING NEG PRSS L W15XH3.3XL26CM BLK POLYUR DRP PD

## (undated) DEVICE — BNDG,ELSTC,MATRIX,STRL,6"X5YD,LF,HOOK&LP: Brand: MEDLINE

## (undated) DEVICE — GLOVE ORANGE PI 8   MSG9080

## (undated) DEVICE — DRESSING PETRO W3XL8IN OIL EMUL N ADH GZ KNIT IMPREG CELOS

## (undated) DEVICE — HYPODERMIC SAFETY NEEDLE: Brand: MAGELLAN

## (undated) DEVICE — PADDING,UNDERCAST,COTTON, 4"X4YD STERILE: Brand: MEDLINE

## (undated) DEVICE — C-ARM: Brand: UNBRANDED

## (undated) DEVICE — BANDAGE COMPR W6INXL15YD WHT BGE POLY COT WV E HK LOOP CLSR

## (undated) DEVICE — DRAPE,U/ SHT,SPLIT,PLAS,STERIL: Brand: MEDLINE

## (undated) DEVICE — SURGICAL SUCTION CONNECTING TUBE WITH MALE CONNECTOR AND SUCTION CLAMP, 2 FT. LONG (.6 M), 5 MM I.D.: Brand: CONMED

## (undated) DEVICE — SOLUTION SCRB 4OZ 4% CHG H2O AIDED FOR PREOPERATIVE SKIN

## (undated) DEVICE — INTENDED TO SUPPORT AND MAINTAIN THE POSITION OF AN ANESTHETIZED PATIENT DURING SURGERY: Brand: MD TRACTION ROPE

## (undated) DEVICE — GOWN,SURGICAL,AURORA,SLEEVE: Brand: MEDLINE

## (undated) DEVICE — STRAP,POSITIONING,KNEE/BODY,FOAM,4X60": Brand: MEDLINE

## (undated) DEVICE — BIT DRL L250MM DIA2.8MM CALIB L95MM QUIK CPL W/ STP FOR

## (undated) DEVICE — INTENDED FOR TISSUE SEPARATION, AND OTHER PROCEDURES THAT REQUIRE A SHARP SURGICAL BLADE TO PUNCTURE OR CUT.: Brand: BARD-PARKER ® CARBON RIB-BACK BLADES

## (undated) DEVICE — APPLICATOR MEDICATED 10.5 CC SOLUTION HI LT ORNG CHLORAPREP

## (undated) DEVICE — DRESSING FOAM W4XL4IN AG SIL FACE BORD IONIC ANTIMIC ADH

## (undated) DEVICE — ZIMMER SKIN GRAFT CARRIER 16 INCH  LENGTH: Brand: DERMACARRIERS

## (undated) DEVICE — TUBING, SUCTION, 9/32" X 20', STRAIGHT: Brand: MEDLINE INDUSTRIES, INC.

## (undated) DEVICE — PADDING CAST W6INXL4YD COT LO LINTING WYTEX

## (undated) DEVICE — CYSTO/BLADDER IRRIGATION SET, REGULATING CLAMP

## (undated) DEVICE — GOWN,AURORA,NONREINFORCED,LARGE: Brand: MEDLINE

## (undated) DEVICE — APPLICATOR MEDICATED 26 CC SOLUTION HI LT ORNG CHLORAPREP

## (undated) DEVICE — SUTURE ETHLN SZ 2-0 L18IN NONABSORBABLE BLK L26MM PS 3/8 585H

## (undated) DEVICE — GOWN,SIRUS,NONRNF,SETINSLV,XL,20/CS: Brand: MEDLINE

## (undated) DEVICE — POUCH INSTR W6.75XL11.5IN FRST 2 PKT ADH FOR ORTH AND

## (undated) DEVICE — 3M™ STERI-STRIP™ COMPOUND BENZOIN TINCTURE 40 BAGS/CARTON 4 CARTONS/CASE C1544: Brand: 3M™ STERI-STRIP™

## (undated) DEVICE — TUBING SUCT 12FR MAL ALUM SHFT FN CAP VENT UNIV CONN W/ OBT

## (undated) DEVICE — SUTURE MCRYL SZ 2-0 L27IN ABSRB UD SH L26MM TAPERPOINT NDL Y417H

## (undated) DEVICE — Device

## (undated) DEVICE — SVMMC ORTH SPL DRP PK

## (undated) DEVICE — C-ARMOR C-ARM EQUIPMENT COVERS CLEAR STERILE UNIVERSAL FIT 12 PER CASE: Brand: C-ARMOR

## (undated) DEVICE — SUTURE PDS II SZ 0 L27IN ABSRB VLT L36MM CT-1 1/2 CIR Z340H

## (undated) DEVICE — BANDAGE,GAUZE,BULKEE II,4.5"X4.1YD,STRL: Brand: MEDLINE

## (undated) DEVICE — BNDG,ELSTC,MATRIX,STRL,4"X5YD,LF,HOOK&LP: Brand: MEDLINE

## (undated) DEVICE — ELECTRODE PT RET AD L9FT HI MOIST COND ADH HYDRGEL CORDED

## (undated) DEVICE — BIT DRL L330MM DIA4.2MM CALIB 100MM 3 FLUT QUIK CPL

## (undated) DEVICE — TOTAL TRAY, 16FR 10ML SIL FOLEY, URN: Brand: MEDLINE

## (undated) DEVICE — PREMIUM DRY TRAY LF: Brand: MEDLINE INDUSTRIES, INC.

## (undated) DEVICE — ADHESIVE SKIN CLOSURE TOP 36 CC HI VISC DERMBND MINI

## (undated) DEVICE — BANDAGE COBAN 4 IN COMPR W4INXL5YD FOAM COHESIVE QUIK STK SELF ADH SFT

## (undated) DEVICE — DRESSING NEG PRESSURE WND VAC

## (undated) DEVICE — DRESSING TRNSPAR W5XL4.5IN FLM SHT SEMIPERMEABLE WIND

## (undated) DEVICE — SUTURE VCRL SZ 2-0 L18IN ABSRB UD CT-1 L36MM 1/2 CIR J839D

## (undated) DEVICE — BASIC SINGLE BASIN 1-LF: Brand: MEDLINE INDUSTRIES, INC.

## (undated) DEVICE — BIT DRL L250MM DIA2.5MM CALIB L95MM QUIK CPL W/ STP FOR

## (undated) DEVICE — BANDAGE COBAN 6 IN WND 6INX5YD FOAM

## (undated) DEVICE — YANKAUER,FLEXIBLE HANDLE,REGLR CAPACITY: Brand: MEDLINE INDUSTRIES, INC.

## (undated) DEVICE — BIT DRL QC 2.5X170 MM 80 MM CALIB NS

## (undated) DEVICE — THE STERILE LIGHT HANDLE COVER IS USED WITH STERIS SURGICAL LIGHTING AND VISUALIZATION SYSTEMS.

## (undated) DEVICE — GLOVE ORANGE PI 8 1/2   MSG9085

## (undated) DEVICE — SUTURE PDS II SZ 0 L18IN ABSRB VLT L36MM CT-1 1/2 CIR Z740D

## (undated) DEVICE — BANDAGE COMPR W6INXL12FT SMOOTH FOR LIMB EXSANG ESMARCH

## (undated) DEVICE — GLOVE ORANGE PI 7 1/2   MSG9075

## (undated) DEVICE — BIT DRL L145MM DIA4.2MM NONSTERILE 3 FLUT NDL PNT QUIK CPL

## (undated) DEVICE — NEPTUNE E-SEP SMOKE EVACUATION PENCIL, COATED, 70MM BLADE, PUSH BUTTON SWITCH: Brand: NEPTUNE E-SEP

## (undated) DEVICE — GLOVE ORTHO 8   MSG9480

## (undated) DEVICE — BIT DRL QC 2X110 MM 30 MM CALIB NS

## (undated) DEVICE — OPTIFOAM GENTLE AG,POST-OP STRIP,3.5X14: Brand: MEDLINE

## (undated) DEVICE — TOWEL,OR,DSP,ST,NATURAL,DLX,4/PK,20PK/CS: Brand: MEDLINE

## (undated) DEVICE — SHEET, ORTHO, SPLIT, STERILE: Brand: MEDLINE

## (undated) DEVICE — BIT DRL L195MM DIA3.2MM 3 FLUT QUIK CPL W/O STP REUSE FOR E

## (undated) DEVICE — GUIDEWIRE ORTH L400MM DIA3.2MM FOR TFN

## (undated) DEVICE — GAUZE,SPONGE,FLUFF,6"X6.75",STRL,5/TRAY: Brand: MEDLINE

## (undated) DEVICE — SUTURE FIBERWIRE SZ 5 L38IN NONABSORBABLE BLU L48MM 1/2 AR7211

## (undated) DEVICE — SPONGE LAP W18XL18IN WHT COT 4 PLY FLD STRUNG RADPQ DISP ST

## (undated) DEVICE — CANISTER NEG PRSS 500ML WND THER W/ TBNG NO PRSS RANG W/

## (undated) DEVICE — POSITIONER HD W8XH4XL8.5IN RASPBERRY FOAM SLT

## (undated) DEVICE — PAD,NON-ADHERENT,3X8,STERILE,LF,1/PK: Brand: MEDLINE

## (undated) DEVICE — CONTAINER,SPECIMEN,4OZ,OR STRL: Brand: MEDLINE

## (undated) DEVICE — CULTURETTE AERO/ANEROBIC RED/BLUE BUNDLE

## (undated) DEVICE — CLEANSER WND CLN DEB SYS IRRISEPT

## (undated) DEVICE — SUTURE VCRL SZ 0 L18IN ABSRB UD L36MM CT-1 1/2 CIR J840D

## (undated) DEVICE — GLOVE SURG SZ 65 THK91MIL LTX FREE SYN POLYISOPRENE

## (undated) DEVICE — SCRUB SURG 4% CHG W FOAM SPNG DYNA HEX

## (undated) DEVICE — ROD RM L1150MM DIA2.5MM TI W/ EXTN BALL TIP FOR IM NAIL

## (undated) DEVICE — INTENT TO BE USED WITH SUTURE MATERIAL FOR TISSUE CLOSURE: Brand: RICHARD-ALLAN® NEEDLE 1/2 CIRCLE TAPER

## (undated) DEVICE — ZIMMER® STERILE DISPOSABLE TOURNIQUET CUFF WITH PROTECTIVE SLEEVE AND PLC, DUAL PORT, SINGLE BLADDER, 42 IN. (107 CM)

## (undated) DEVICE — SYRINGE CATH TIP 50ML

## (undated) DEVICE — BIT DRL L96MM DIA1.5MM MINI QUIK CPL CALIB W/O STP REUSE

## (undated) DEVICE — PAD,ABDOMINAL,5"X9",ST,LF,25/BX: Brand: MEDLINE INDUSTRIES, INC.

## (undated) DEVICE — NAIL IM L440MM DIA2.5MM PROX TIB PNK TI ALLY FOR E STBL MOD: Type: IMPLANTABLE DEVICE | Status: NON-FUNCTIONAL

## (undated) DEVICE — LOOP VES W25MM THK1MM MAXI RED SIL FLD REPELLENT 100 PER

## (undated) DEVICE — GLOVE ORANGE PI 7   MSG9070

## (undated) DEVICE — SUTURE PROL SZ 0 L30IN NONABSORBABLE BLU V-34 L36MM 1/2 CIR 8444H

## (undated) DEVICE — STOCKINETTE,IMPERVIOUS,12X48,STERILE: Brand: MEDLINE

## (undated) DEVICE — BLADE CLIPPER GEN PURP NS

## (undated) DEVICE — COVER,MAYO STAND,STERILE: Brand: MEDLINE

## (undated) DEVICE — BIT DRL QC 2.5X135 MM 45 MM CALIB NS

## (undated) DEVICE — MITT SURG PREP L ADH DISPOSABLE

## (undated) DEVICE — GARMENT,MEDLINE,DVT,INT,CALF,MED, GEN2: Brand: MEDLINE

## (undated) DEVICE — DERMATOME BLADES: Brand: DERMATOME

## (undated) DEVICE — CANISTER NEG PRSS 1000ML W/ GEL INFOVAC

## (undated) DEVICE — BIT DRL L110MM DIA3.5MM QUIK CPL W/O STP REUSE

## (undated) DEVICE — DRESSING FOAM W4XL10IN AG SIL ADH ANTIMIC POSTOP OPTIFOAM

## (undated) DEVICE — DRESSING TRNSPAR W2XL2.75IN FLM SHT SEMIPERMEABLE WIND

## (undated) DEVICE — DRESSING,GAUZE,XEROFORM,CURAD,1"X8",ST: Brand: CURAD

## (undated) DEVICE — GAUZE WND W4XL108IN WHT PETRO W/ XRFRM COT IMPREG DISP

## (undated) DEVICE — STAPLE REMOVER TRAY: Brand: MEDLINE INDUSTRIES, INC.